# Patient Record
Sex: MALE | Race: WHITE | Employment: OTHER | URBAN - METROPOLITAN AREA
[De-identification: names, ages, dates, MRNs, and addresses within clinical notes are randomized per-mention and may not be internally consistent; named-entity substitution may affect disease eponyms.]

---

## 2017-01-06 ENCOUNTER — TELEPHONE (OUTPATIENT)
Dept: CARDIOLOGY CLINIC | Age: 77
End: 2017-01-06

## 2017-01-16 ENCOUNTER — TELEPHONE (OUTPATIENT)
Dept: CARDIOLOGY CLINIC | Age: 77
End: 2017-01-16

## 2017-01-18 ENCOUNTER — TELEPHONE (OUTPATIENT)
Dept: CARDIOLOGY CLINIC | Age: 77
End: 2017-01-18

## 2017-01-18 DIAGNOSIS — I49.9 IRREGULAR HEARTBEAT: Primary | ICD-10-CM

## 2017-02-01 ENCOUNTER — OFFICE VISIT (OUTPATIENT)
Dept: CARDIOLOGY CLINIC | Age: 77
End: 2017-02-01

## 2017-02-01 VITALS
HEIGHT: 70 IN | BODY MASS INDEX: 39.71 KG/M2 | WEIGHT: 277.4 LBS | SYSTOLIC BLOOD PRESSURE: 130 MMHG | HEART RATE: 55 BPM | OXYGEN SATURATION: 96 % | DIASTOLIC BLOOD PRESSURE: 62 MMHG

## 2017-02-01 DIAGNOSIS — I48.0 PAROXYSMAL ATRIAL FIBRILLATION (HCC): ICD-10-CM

## 2017-02-01 DIAGNOSIS — I25.10 CORONARY ARTERY DISEASE INVOLVING NATIVE CORONARY ARTERY OF NATIVE HEART WITHOUT ANGINA PECTORIS: Primary | ICD-10-CM

## 2017-02-01 DIAGNOSIS — I10 ESSENTIAL HYPERTENSION, BENIGN: ICD-10-CM

## 2017-02-01 DIAGNOSIS — E78.00 HYPERCHOLESTEREMIA: ICD-10-CM

## 2017-02-01 PROCEDURE — G8419 CALC BMI OUT NRM PARAM NOF/U: HCPCS | Performed by: INTERNAL MEDICINE

## 2017-02-01 PROCEDURE — G8484 FLU IMMUNIZE NO ADMIN: HCPCS | Performed by: INTERNAL MEDICINE

## 2017-02-01 PROCEDURE — 99214 OFFICE O/P EST MOD 30 MIN: CPT | Performed by: INTERNAL MEDICINE

## 2017-02-01 PROCEDURE — 1036F TOBACCO NON-USER: CPT | Performed by: INTERNAL MEDICINE

## 2017-02-01 PROCEDURE — G8598 ASA/ANTIPLAT THER USED: HCPCS | Performed by: INTERNAL MEDICINE

## 2017-02-01 PROCEDURE — 1123F ACP DISCUSS/DSCN MKR DOCD: CPT | Performed by: INTERNAL MEDICINE

## 2017-02-01 PROCEDURE — 4040F PNEUMOC VAC/ADMIN/RCVD: CPT | Performed by: INTERNAL MEDICINE

## 2017-02-01 PROCEDURE — 3017F COLORECTAL CA SCREEN DOC REV: CPT | Performed by: INTERNAL MEDICINE

## 2017-02-01 PROCEDURE — G8427 DOCREV CUR MEDS BY ELIG CLIN: HCPCS | Performed by: INTERNAL MEDICINE

## 2017-02-02 RX ORDER — RANITIDINE 150 MG/1
TABLET ORAL
Qty: 30 TABLET | Refills: 5 | Status: SHIPPED | OUTPATIENT
Start: 2017-02-02 | End: 2017-05-31 | Stop reason: ALTCHOICE

## 2017-05-16 ENCOUNTER — TELEPHONE (OUTPATIENT)
Dept: CARDIOLOGY CLINIC | Age: 77
End: 2017-05-16

## 2017-05-31 ENCOUNTER — OFFICE VISIT (OUTPATIENT)
Dept: CARDIOLOGY CLINIC | Age: 77
End: 2017-05-31

## 2017-05-31 VITALS
BODY MASS INDEX: 41.37 KG/M2 | WEIGHT: 289 LBS | HEART RATE: 70 BPM | OXYGEN SATURATION: 97 % | DIASTOLIC BLOOD PRESSURE: 80 MMHG | SYSTOLIC BLOOD PRESSURE: 138 MMHG | HEIGHT: 70 IN

## 2017-05-31 DIAGNOSIS — I48.0 PAF (PAROXYSMAL ATRIAL FIBRILLATION) (HCC): ICD-10-CM

## 2017-05-31 DIAGNOSIS — I25.10 CORONARY ARTERY DISEASE INVOLVING NATIVE CORONARY ARTERY OF NATIVE HEART WITHOUT ANGINA PECTORIS: Primary | ICD-10-CM

## 2017-05-31 DIAGNOSIS — I10 ESSENTIAL HYPERTENSION: ICD-10-CM

## 2017-05-31 DIAGNOSIS — E78.5 HYPERLIPIDEMIA LDL GOAL <70: ICD-10-CM

## 2017-05-31 PROCEDURE — 99214 OFFICE O/P EST MOD 30 MIN: CPT | Performed by: INTERNAL MEDICINE

## 2017-05-31 PROCEDURE — 93000 ELECTROCARDIOGRAM COMPLETE: CPT | Performed by: INTERNAL MEDICINE

## 2017-05-31 PROCEDURE — G8417 CALC BMI ABV UP PARAM F/U: HCPCS | Performed by: INTERNAL MEDICINE

## 2017-05-31 PROCEDURE — G8598 ASA/ANTIPLAT THER USED: HCPCS | Performed by: INTERNAL MEDICINE

## 2017-05-31 PROCEDURE — 3017F COLORECTAL CA SCREEN DOC REV: CPT | Performed by: INTERNAL MEDICINE

## 2017-05-31 PROCEDURE — 1036F TOBACCO NON-USER: CPT | Performed by: INTERNAL MEDICINE

## 2017-05-31 PROCEDURE — 1123F ACP DISCUSS/DSCN MKR DOCD: CPT | Performed by: INTERNAL MEDICINE

## 2017-05-31 PROCEDURE — 4040F PNEUMOC VAC/ADMIN/RCVD: CPT | Performed by: INTERNAL MEDICINE

## 2017-05-31 PROCEDURE — G8427 DOCREV CUR MEDS BY ELIG CLIN: HCPCS | Performed by: INTERNAL MEDICINE

## 2017-05-31 RX ORDER — CLOPIDOGREL BISULFATE 75 MG/1
75 TABLET ORAL DAILY
Qty: 90 TABLET | Refills: 3 | Status: SHIPPED | OUTPATIENT
Start: 2017-05-31 | End: 2018-05-23 | Stop reason: DRUGHIGH

## 2017-05-31 RX ORDER — CALCIUM CARBONATE 500(1250)
500 TABLET ORAL DAILY
COMMUNITY
End: 2021-06-11

## 2017-05-31 RX ORDER — MAGNESIUM OXIDE 400 MG/1
400 TABLET ORAL DAILY
COMMUNITY
End: 2020-08-17

## 2017-05-31 RX ORDER — ACETAMINOPHEN 160 MG
1 TABLET,DISINTEGRATING ORAL DAILY
COMMUNITY

## 2017-06-13 ENCOUNTER — TELEPHONE (OUTPATIENT)
Dept: CARDIOLOGY CLINIC | Age: 77
End: 2017-06-13

## 2017-06-13 RX ORDER — DABIGATRAN ETEXILATE 75 MG/1
75 CAPSULE, COATED PELLETS ORAL 2 TIMES DAILY
Qty: 180 CAPSULE | Refills: 3 | Status: SHIPPED | OUTPATIENT
Start: 2017-06-13 | End: 2018-05-23 | Stop reason: ALTCHOICE

## 2017-06-21 RX ORDER — VALSARTAN AND HYDROCHLOROTHIAZIDE 160; 25 MG/1; MG/1
1 TABLET ORAL 2 TIMES DAILY
Qty: 180 TABLET | Refills: 3 | Status: SHIPPED | OUTPATIENT
Start: 2017-06-21 | End: 2018-05-23 | Stop reason: DRUGHIGH

## 2017-07-11 ENCOUNTER — OFFICE VISIT (OUTPATIENT)
Dept: CARDIOLOGY CLINIC | Age: 77
End: 2017-07-11

## 2017-07-11 VITALS
BODY MASS INDEX: 42.95 KG/M2 | HEIGHT: 69 IN | SYSTOLIC BLOOD PRESSURE: 130 MMHG | OXYGEN SATURATION: 98 % | HEART RATE: 66 BPM | WEIGHT: 290 LBS | DIASTOLIC BLOOD PRESSURE: 80 MMHG

## 2017-07-11 DIAGNOSIS — I25.10 CORONARY ARTERY DISEASE INVOLVING NATIVE CORONARY ARTERY OF NATIVE HEART WITHOUT ANGINA PECTORIS: ICD-10-CM

## 2017-07-11 DIAGNOSIS — I49.9 IRREGULAR HEARTBEAT: Primary | ICD-10-CM

## 2017-07-11 PROCEDURE — G8598 ASA/ANTIPLAT THER USED: HCPCS | Performed by: INTERNAL MEDICINE

## 2017-07-11 PROCEDURE — 1123F ACP DISCUSS/DSCN MKR DOCD: CPT | Performed by: INTERNAL MEDICINE

## 2017-07-11 PROCEDURE — G8417 CALC BMI ABV UP PARAM F/U: HCPCS | Performed by: INTERNAL MEDICINE

## 2017-07-11 PROCEDURE — 99214 OFFICE O/P EST MOD 30 MIN: CPT | Performed by: INTERNAL MEDICINE

## 2017-07-11 PROCEDURE — 1036F TOBACCO NON-USER: CPT | Performed by: INTERNAL MEDICINE

## 2017-07-11 PROCEDURE — G8427 DOCREV CUR MEDS BY ELIG CLIN: HCPCS | Performed by: INTERNAL MEDICINE

## 2017-07-11 PROCEDURE — 4040F PNEUMOC VAC/ADMIN/RCVD: CPT | Performed by: INTERNAL MEDICINE

## 2017-07-11 PROCEDURE — 3017F COLORECTAL CA SCREEN DOC REV: CPT | Performed by: INTERNAL MEDICINE

## 2017-07-11 PROCEDURE — 93000 ELECTROCARDIOGRAM COMPLETE: CPT | Performed by: INTERNAL MEDICINE

## 2017-07-31 ENCOUNTER — TELEPHONE (OUTPATIENT)
Dept: CARDIOLOGY CLINIC | Age: 77
End: 2017-07-31

## 2017-07-31 DIAGNOSIS — R31.9 HEMATURIA: Primary | ICD-10-CM

## 2017-07-31 RX ORDER — ATENOLOL 50 MG/1
50 TABLET ORAL 2 TIMES DAILY
Qty: 180 TABLET | Refills: 2 | Status: SHIPPED | OUTPATIENT
Start: 2017-07-31 | End: 2018-04-19 | Stop reason: SDUPTHER

## 2017-10-05 ENCOUNTER — TELEPHONE (OUTPATIENT)
Dept: CARDIOLOGY CLINIC | Age: 77
End: 2017-10-05

## 2017-10-05 NOTE — TELEPHONE ENCOUNTER
Dr. Renetta Ramirez,     Patient seen 7/11/17. Diovan HCT increased to 160/25mg BID for now. Received labs: BUN/crt 26/1.28, previous crt 1.2 in 12/2016. Would you like to continue increase dose of Diovan HCT? Thank you. Hx: CAD s/p multiple stents, Afib, HTN, DVT and HLD.

## 2017-10-06 ENCOUNTER — TELEPHONE (OUTPATIENT)
Dept: CARDIOLOGY CLINIC | Age: 77
End: 2017-10-06

## 2017-10-06 NOTE — TELEPHONE ENCOUNTER
Dr. Silver Hunter, pt with hx of AFIB. Pt stopped taking Pradaxa 1 wk ago d/t hematuria. Cost of Pradaxa is also increasing in cost from $45 to $180 in January. Eliquis was too expensive and Xarelto also caused hematuria. He is no longer on ASA due to hematuria and only takes Plavix for CAD. Please advise, thanks.

## 2017-10-06 NOTE — TELEPHONE ENCOUNTER
A monitor is not indicated at this time because we know his diagnosis already. We know that anticoagulation is recommended. I know that he had a cystoscopy earlier in the year- was that the last time he had a urology workup? He probably needs to see them again about the hematuria because something is likely causing the bleeding (which is exacerbated by the blood thinner.)    Pending that Dr. Randa Deluna may consider watchman.

## 2017-10-06 NOTE — TELEPHONE ENCOUNTER
Called and spoke to patient. He will continue to take Diovan 160/25 mg 1 BID as he just received a 90 day supply.

## 2017-10-06 NOTE — TELEPHONE ENCOUNTER
Called and spoke to patient. Instructed to F/U with Urologist for hematuria. Patient expresses understanding.

## 2018-04-19 RX ORDER — ATENOLOL 50 MG/1
50 TABLET ORAL 2 TIMES DAILY
Qty: 180 TABLET | Refills: 0 | Status: SHIPPED | OUTPATIENT
Start: 2018-04-19 | End: 2018-05-23 | Stop reason: DRUGHIGH

## 2018-05-23 ENCOUNTER — OFFICE VISIT (OUTPATIENT)
Dept: CARDIOLOGY CLINIC | Age: 78
End: 2018-05-23

## 2018-05-23 VITALS
DIASTOLIC BLOOD PRESSURE: 80 MMHG | HEIGHT: 70 IN | WEIGHT: 302 LBS | BODY MASS INDEX: 43.23 KG/M2 | HEART RATE: 63 BPM | SYSTOLIC BLOOD PRESSURE: 138 MMHG | OXYGEN SATURATION: 97 %

## 2018-05-23 DIAGNOSIS — E78.2 MIXED HYPERLIPIDEMIA: ICD-10-CM

## 2018-05-23 DIAGNOSIS — I25.10 CORONARY ARTERY DISEASE INVOLVING NATIVE CORONARY ARTERY OF NATIVE HEART WITHOUT ANGINA PECTORIS: Primary | ICD-10-CM

## 2018-05-23 DIAGNOSIS — I10 ESSENTIAL HYPERTENSION: ICD-10-CM

## 2018-05-23 DIAGNOSIS — I48.0 PAF (PAROXYSMAL ATRIAL FIBRILLATION) (HCC): ICD-10-CM

## 2018-05-23 PROCEDURE — G8598 ASA/ANTIPLAT THER USED: HCPCS | Performed by: INTERNAL MEDICINE

## 2018-05-23 PROCEDURE — 93000 ELECTROCARDIOGRAM COMPLETE: CPT | Performed by: INTERNAL MEDICINE

## 2018-05-23 PROCEDURE — G8427 DOCREV CUR MEDS BY ELIG CLIN: HCPCS | Performed by: INTERNAL MEDICINE

## 2018-05-23 PROCEDURE — 1123F ACP DISCUSS/DSCN MKR DOCD: CPT | Performed by: INTERNAL MEDICINE

## 2018-05-23 PROCEDURE — 4040F PNEUMOC VAC/ADMIN/RCVD: CPT | Performed by: INTERNAL MEDICINE

## 2018-05-23 PROCEDURE — G8417 CALC BMI ABV UP PARAM F/U: HCPCS | Performed by: INTERNAL MEDICINE

## 2018-05-23 PROCEDURE — 1036F TOBACCO NON-USER: CPT | Performed by: INTERNAL MEDICINE

## 2018-05-23 PROCEDURE — 99214 OFFICE O/P EST MOD 30 MIN: CPT | Performed by: INTERNAL MEDICINE

## 2018-05-23 RX ORDER — CLOPIDOGREL BISULFATE 75 MG/1
75 TABLET ORAL DAILY
Qty: 90 TABLET | Refills: 5 | Status: SHIPPED | OUTPATIENT
Start: 2018-05-23 | End: 2018-12-03

## 2018-05-23 RX ORDER — VALSARTAN AND HYDROCHLOROTHIAZIDE 160; 25 MG/1; MG/1
1 TABLET ORAL 2 TIMES DAILY
Qty: 180 TABLET | Refills: 5 | Status: SHIPPED | OUTPATIENT
Start: 2018-05-23 | End: 2019-01-27 | Stop reason: ALTCHOICE

## 2018-05-23 RX ORDER — ATENOLOL 50 MG/1
50 TABLET ORAL 2 TIMES DAILY
Qty: 180 TABLET | Refills: 5 | Status: SHIPPED | OUTPATIENT
Start: 2018-05-23 | End: 2018-12-03

## 2018-06-19 RX ORDER — CLOPIDOGREL BISULFATE 75 MG/1
75 TABLET ORAL DAILY
Qty: 90 TABLET | Refills: 3 | Status: SHIPPED | OUTPATIENT
Start: 2018-06-19 | End: 2019-06-16 | Stop reason: SDUPTHER

## 2018-07-23 RX ORDER — ATENOLOL 50 MG/1
50 TABLET ORAL 2 TIMES DAILY
Qty: 180 TABLET | Refills: 3 | Status: SHIPPED | OUTPATIENT
Start: 2018-07-23 | End: 2019-08-27 | Stop reason: SDUPTHER

## 2018-11-01 ENCOUNTER — TELEPHONE (OUTPATIENT)
Dept: CARDIOLOGY CLINIC | Age: 78
End: 2018-11-01

## 2018-11-02 DIAGNOSIS — I10 ESSENTIAL HYPERTENSION, BENIGN: Primary | ICD-10-CM

## 2018-11-02 RX ORDER — NIFEDIPINE 30 MG/1
30 TABLET, FILM COATED, EXTENDED RELEASE ORAL DAILY
Qty: 90 TABLET | Refills: 5 | Status: SHIPPED | OUTPATIENT
Start: 2018-11-02 | End: 2020-01-24

## 2018-11-09 ENCOUNTER — TELEPHONE (OUTPATIENT)
Dept: CARDIOLOGY CLINIC | Age: 78
End: 2018-11-09

## 2018-11-09 RX ORDER — VALSARTAN AND HYDROCHLOROTHIAZIDE 160; 25 MG/1; MG/1
1 TABLET ORAL 2 TIMES DAILY
Qty: 180 TABLET | Refills: 2 | Status: SHIPPED | OUTPATIENT
Start: 2018-11-09 | End: 2018-12-03

## 2018-12-03 ENCOUNTER — OFFICE VISIT (OUTPATIENT)
Dept: CARDIOLOGY CLINIC | Age: 78
End: 2018-12-03
Payer: MEDICARE

## 2018-12-03 VITALS
BODY MASS INDEX: 38.22 KG/M2 | HEIGHT: 70 IN | DIASTOLIC BLOOD PRESSURE: 62 MMHG | HEART RATE: 62 BPM | OXYGEN SATURATION: 97 % | SYSTOLIC BLOOD PRESSURE: 110 MMHG | WEIGHT: 267 LBS

## 2018-12-03 DIAGNOSIS — I10 ESSENTIAL HYPERTENSION: ICD-10-CM

## 2018-12-03 DIAGNOSIS — I48.0 PAF (PAROXYSMAL ATRIAL FIBRILLATION) (HCC): ICD-10-CM

## 2018-12-03 DIAGNOSIS — I25.10 CORONARY ARTERY DISEASE INVOLVING NATIVE CORONARY ARTERY OF NATIVE HEART WITHOUT ANGINA PECTORIS: Primary | ICD-10-CM

## 2018-12-03 DIAGNOSIS — E78.2 MIXED HYPERLIPIDEMIA: ICD-10-CM

## 2018-12-03 PROCEDURE — 1036F TOBACCO NON-USER: CPT | Performed by: INTERNAL MEDICINE

## 2018-12-03 PROCEDURE — 4040F PNEUMOC VAC/ADMIN/RCVD: CPT | Performed by: INTERNAL MEDICINE

## 2018-12-03 PROCEDURE — G8417 CALC BMI ABV UP PARAM F/U: HCPCS | Performed by: INTERNAL MEDICINE

## 2018-12-03 PROCEDURE — 1123F ACP DISCUSS/DSCN MKR DOCD: CPT | Performed by: INTERNAL MEDICINE

## 2018-12-03 PROCEDURE — G8484 FLU IMMUNIZE NO ADMIN: HCPCS | Performed by: INTERNAL MEDICINE

## 2018-12-03 PROCEDURE — 93000 ELECTROCARDIOGRAM COMPLETE: CPT | Performed by: INTERNAL MEDICINE

## 2018-12-03 PROCEDURE — G8427 DOCREV CUR MEDS BY ELIG CLIN: HCPCS | Performed by: INTERNAL MEDICINE

## 2018-12-03 PROCEDURE — 1101F PT FALLS ASSESS-DOCD LE1/YR: CPT | Performed by: INTERNAL MEDICINE

## 2018-12-03 PROCEDURE — G8598 ASA/ANTIPLAT THER USED: HCPCS | Performed by: INTERNAL MEDICINE

## 2018-12-03 PROCEDURE — 99214 OFFICE O/P EST MOD 30 MIN: CPT | Performed by: INTERNAL MEDICINE

## 2018-12-03 NOTE — PATIENT INSTRUCTIONS
Patient Education        Heart-Healthy Diet: Care Instructions  Your Care Instructions    A heart-healthy diet has lots of vegetables, fruits, nuts, beans, and whole grains, and is low in salt. It limits foods that are high in saturated fat, such as meats, cheeses, and fried foods. It may be hard to change your diet, but even small changes can lower your risk of heart attack and heart disease. Follow-up care is a key part of your treatment and safety. Be sure to make and go to all appointments, and call your doctor if you are having problems. It's also a good idea to know your test results and keep a list of the medicines you take. How can you care for yourself at home? Watch your portions  · Learn what a serving is. A \"serving\" and a \"portion\" are not always the same thing. Make sure that you are not eating larger portions than are recommended. For example, a serving of pasta is ½ cup. A serving size of meat is 2 to 3 ounces. A 3-ounce serving is about the size of a deck of cards. Measure serving sizes until you are good at [place]" them. Keep in mind that restaurants often serve portions that are 2 or 3 times the size of one serving. · To keep your energy level up and keep you from feeling hungry, eat often but in smaller portions. · Eat only the number of calories you need to stay at a healthy weight. If you need to lose weight, eat fewer calories than your body burns (through exercise and other physical activity). Eat more fruits and vegetables  · Eat a variety of fruit and vegetables every day. Dark green, deep orange, red, or yellow fruits and vegetables are especially good for you. Examples include spinach, carrots, peaches, and berries. · Keep carrots, celery, and other veggies handy for snacks. Buy fruit that is in season and store it where you can see it so that you will be tempted to eat it. · Cook dishes that have a lot of veggies in them, such as stir-fries and soups.   Limit saturated and

## 2018-12-03 NOTE — PROGRESS NOTES
4.0-mm x 16-mm Synergy stent lesion reduced from 80% to 0%. ECHO 12/9/2016   Normal LV size and systolic function: EF is  86%. Grade I diastolic  dysfunction.   Trace mitral regurgitation is present.   The left atrium is normal in size.   Normal right ventricular size and function.   There is trace to mild tricuspid regurgitation with RVSP estimated at 40  mmHg. which is mildly elevated.   Trivial pulmonic regurgitation present.       MPI 3/25/2015:   Normal myocardial perfusion study. Normal LV size and systolic function     Coronary Angiogram and PCI: 12/2016  1. Two-vessel coronary disease with two tandem lesions in the 80-90% range involving the proximal circumflex. 2. An 80% eccentric mid RCA lesion. 3. Normal left ventricular size and systolic function. 4. Normal hemodynamics  Successful stenting of the proximal circumflex. Two tandem lesions were reduced to 0% using a 3.0-mm x 34-mm Synergy stent. The mid right coronary artery was stented using a 4.0-mm x 16-mm Synergy stent lesion reduced from 80% to 0%. Assessment and Plan:    Patient needs to have eye lid surgery and has been instructed to hold ASA and Plavix for 2 weeks prior. I have discussed the risk of stopping both and asked that he check with surgeon to see if he can perform surgery at least while on ASA.       CAD. S/p stenting to mid RCA and Proximal Circumflex 12/9/2016. No further angina  Continue ASA and Plavix    Intolerant to statins. Hypertension. (Over age 62, goal BP <150/90.)  Has been labile in the past but controlled today  Continue Diovan and recently prescribed Nifedipine     Paroxysmal atrial fibrillation. Xarelto stopped due to hematuria; Tried Eliquis 2.5 bid but too expensive. Also tried Pradaxa; hematuria    Not a candidate for Watchman given his difficult anatomy. Have discussed in detail risk of stroke. Patient understands risk. Regular rhythm today. Continue beta blocker for rate control.    Not on

## 2018-12-27 ENCOUNTER — TELEPHONE (OUTPATIENT)
Dept: CARDIOLOGY CLINIC | Age: 78
End: 2018-12-27

## 2018-12-27 NOTE — TELEPHONE ENCOUNTER
Dr Casie Padilla  Patient is having a Browplasty Upper Lid surgery on 1/10/19. He will need to hold Plavix for 10 days and aspirin for 14 days. Last OV 12/3/18. He has hx of CAD with s/p stenting 12/2016 and PAF. Thanks.

## 2019-01-27 ENCOUNTER — APPOINTMENT (OUTPATIENT)
Dept: GENERAL RADIOLOGY | Age: 79
DRG: 247 | End: 2019-01-27
Payer: MEDICARE

## 2019-01-27 ENCOUNTER — HOSPITAL ENCOUNTER (OUTPATIENT)
Age: 79
Setting detail: OBSERVATION
Discharge: HOME | DRG: 247 | End: 2019-01-29
Attending: EMERGENCY MEDICINE | Admitting: INTERNAL MEDICINE
Payer: MEDICARE

## 2019-01-27 DIAGNOSIS — R07.9 CHEST PAIN, UNSPECIFIED TYPE: Primary | ICD-10-CM

## 2019-01-27 PROBLEM — I48.0 PAF (PAROXYSMAL ATRIAL FIBRILLATION) (HCC): Status: ACTIVE | Noted: 2019-01-27

## 2019-01-27 LAB
A/G RATIO: 1.6 (ref 1.1–2.2)
ALBUMIN SERPL-MCNC: 4.1 G/DL (ref 3.4–5)
ALP BLD-CCNC: 69 U/L (ref 40–129)
ALT SERPL-CCNC: 19 U/L (ref 10–40)
ANION GAP SERPL CALCULATED.3IONS-SCNC: 8 MMOL/L (ref 3–16)
AST SERPL-CCNC: 13 U/L (ref 15–37)
BASOPHILS ABSOLUTE: 0 K/UL (ref 0–0.2)
BASOPHILS RELATIVE PERCENT: 0.5 %
BILIRUB SERPL-MCNC: 0.4 MG/DL (ref 0–1)
BUN BLDV-MCNC: 17 MG/DL (ref 7–20)
CALCIUM SERPL-MCNC: 9.9 MG/DL (ref 8.3–10.6)
CHLORIDE BLD-SCNC: 103 MMOL/L (ref 99–110)
CO2: 30 MMOL/L (ref 21–32)
CREAT SERPL-MCNC: 1 MG/DL (ref 0.8–1.3)
EOSINOPHILS ABSOLUTE: 0.1 K/UL (ref 0–0.6)
EOSINOPHILS RELATIVE PERCENT: 0.9 %
GFR AFRICAN AMERICAN: >60
GFR NON-AFRICAN AMERICAN: >60
GLOBULIN: 2.6 G/DL
GLUCOSE BLD-MCNC: 122 MG/DL (ref 70–99)
HCT VFR BLD CALC: 42.7 % (ref 40.5–52.5)
HEMOGLOBIN: 14.5 G/DL (ref 13.5–17.5)
INR BLD: 1.06 (ref 0.86–1.14)
LYMPHOCYTES ABSOLUTE: 2.2 K/UL (ref 1–5.1)
LYMPHOCYTES RELATIVE PERCENT: 30.7 %
MCH RBC QN AUTO: 30.2 PG (ref 26–34)
MCHC RBC AUTO-ENTMCNC: 34 G/DL (ref 31–36)
MCV RBC AUTO: 88.8 FL (ref 80–100)
MONOCYTES ABSOLUTE: 0.6 K/UL (ref 0–1.3)
MONOCYTES RELATIVE PERCENT: 8.5 %
NEUTROPHILS ABSOLUTE: 4.3 K/UL (ref 1.7–7.7)
NEUTROPHILS RELATIVE PERCENT: 59.4 %
PDW BLD-RTO: 15.6 % (ref 12.4–15.4)
PLATELET # BLD: 161 K/UL (ref 135–450)
PMV BLD AUTO: 7.7 FL (ref 5–10.5)
POTASSIUM SERPL-SCNC: 4 MMOL/L (ref 3.5–5.1)
PRO-BNP: 150 PG/ML (ref 0–449)
PROTHROMBIN TIME: 12.1 SEC (ref 9.8–13)
RBC # BLD: 4.8 M/UL (ref 4.2–5.9)
SODIUM BLD-SCNC: 141 MMOL/L (ref 136–145)
TOTAL PROTEIN: 6.7 G/DL (ref 6.4–8.2)
TROPONIN: <0.01 NG/ML
TROPONIN: <0.01 NG/ML
WBC # BLD: 7.3 K/UL (ref 4–11)

## 2019-01-27 PROCEDURE — 84484 ASSAY OF TROPONIN QUANT: CPT

## 2019-01-27 PROCEDURE — 83880 ASSAY OF NATRIURETIC PEPTIDE: CPT

## 2019-01-27 PROCEDURE — 99285 EMERGENCY DEPT VISIT HI MDM: CPT

## 2019-01-27 PROCEDURE — 71046 X-RAY EXAM CHEST 2 VIEWS: CPT

## 2019-01-27 PROCEDURE — 80053 COMPREHEN METABOLIC PANEL: CPT

## 2019-01-27 PROCEDURE — G0378 HOSPITAL OBSERVATION PER HR: HCPCS

## 2019-01-27 PROCEDURE — 85610 PROTHROMBIN TIME: CPT

## 2019-01-27 PROCEDURE — 6370000000 HC RX 637 (ALT 250 FOR IP): Performed by: NURSE PRACTITIONER

## 2019-01-27 PROCEDURE — 6370000000 HC RX 637 (ALT 250 FOR IP): Performed by: INTERNAL MEDICINE

## 2019-01-27 PROCEDURE — 36415 COLL VENOUS BLD VENIPUNCTURE: CPT

## 2019-01-27 PROCEDURE — 93005 ELECTROCARDIOGRAM TRACING: CPT | Performed by: EMERGENCY MEDICINE

## 2019-01-27 PROCEDURE — 2580000003 HC RX 258: Performed by: INTERNAL MEDICINE

## 2019-01-27 PROCEDURE — 85025 COMPLETE CBC W/AUTO DIFF WBC: CPT

## 2019-01-27 RX ORDER — VALSARTAN 160 MG/1
160 TABLET ORAL EVERY EVENING
Status: ON HOLD | COMMUNITY
End: 2019-01-29 | Stop reason: HOSPADM

## 2019-01-27 RX ORDER — NICOTINE 21 MG/24HR
1 PATCH, TRANSDERMAL 24 HOURS TRANSDERMAL DAILY PRN
Status: DISCONTINUED | OUTPATIENT
Start: 2019-01-27 | End: 2019-01-27

## 2019-01-27 RX ORDER — POTASSIUM CHLORIDE 1.5 G/1.77G
40 POWDER, FOR SOLUTION ORAL PRN
Status: DISCONTINUED | OUTPATIENT
Start: 2019-01-27 | End: 2019-01-29 | Stop reason: HOSPADM

## 2019-01-27 RX ORDER — NIFEDIPINE 30 MG/1
30 TABLET, EXTENDED RELEASE ORAL DAILY
Status: DISCONTINUED | OUTPATIENT
Start: 2019-01-28 | End: 2019-01-29 | Stop reason: HOSPADM

## 2019-01-27 RX ORDER — POTASSIUM CHLORIDE 7.45 MG/ML
10 INJECTION INTRAVENOUS PRN
Status: DISCONTINUED | OUTPATIENT
Start: 2019-01-27 | End: 2019-01-29 | Stop reason: HOSPADM

## 2019-01-27 RX ORDER — VALSARTAN 160 MG/1
160 TABLET ORAL NIGHTLY
Status: DISCONTINUED | OUTPATIENT
Start: 2019-01-27 | End: 2019-01-29 | Stop reason: HOSPADM

## 2019-01-27 RX ORDER — VALSARTAN AND HYDROCHLOROTHIAZIDE 160; 25 MG/1; MG/1
1 TABLET ORAL 2 TIMES DAILY
Status: DISCONTINUED | OUTPATIENT
Start: 2019-01-27 | End: 2019-01-27

## 2019-01-27 RX ORDER — ONDANSETRON 2 MG/ML
4 INJECTION INTRAMUSCULAR; INTRAVENOUS EVERY 6 HOURS PRN
Status: DISCONTINUED | OUTPATIENT
Start: 2019-01-27 | End: 2019-01-28 | Stop reason: SDUPTHER

## 2019-01-27 RX ORDER — CLOPIDOGREL BISULFATE 75 MG/1
75 TABLET ORAL DAILY
Status: DISCONTINUED | OUTPATIENT
Start: 2019-01-28 | End: 2019-01-28 | Stop reason: SDUPTHER

## 2019-01-27 RX ORDER — POTASSIUM CHLORIDE 20 MEQ/1
40 TABLET, EXTENDED RELEASE ORAL PRN
Status: DISCONTINUED | OUTPATIENT
Start: 2019-01-27 | End: 2019-01-29 | Stop reason: HOSPADM

## 2019-01-27 RX ORDER — NITROGLYCERIN 0.4 MG/1
0.4 TABLET SUBLINGUAL EVERY 5 MIN PRN
Status: DISCONTINUED | OUTPATIENT
Start: 2019-01-27 | End: 2019-01-29 | Stop reason: HOSPADM

## 2019-01-27 RX ORDER — RANITIDINE 150 MG/1
150 TABLET ORAL 2 TIMES DAILY
COMMUNITY
End: 2020-02-14 | Stop reason: ALTCHOICE

## 2019-01-27 RX ORDER — VALSARTAN AND HYDROCHLOROTHIAZIDE 160; 25 MG/1; MG/1
1 TABLET ORAL DAILY
Status: DISCONTINUED | OUTPATIENT
Start: 2019-01-28 | End: 2019-01-29 | Stop reason: HOSPADM

## 2019-01-27 RX ORDER — SODIUM CHLORIDE 0.9 % (FLUSH) 0.9 %
10 SYRINGE (ML) INJECTION EVERY 12 HOURS SCHEDULED
Status: DISCONTINUED | OUTPATIENT
Start: 2019-01-27 | End: 2019-01-28 | Stop reason: SDUPTHER

## 2019-01-27 RX ORDER — FAMOTIDINE 20 MG/1
20 TABLET, FILM COATED ORAL 2 TIMES DAILY PRN
Status: DISCONTINUED | OUTPATIENT
Start: 2019-01-27 | End: 2019-01-29 | Stop reason: HOSPADM

## 2019-01-27 RX ORDER — ASPIRIN 81 MG/1
81 TABLET, CHEWABLE ORAL DAILY
Status: DISCONTINUED | OUTPATIENT
Start: 2019-01-28 | End: 2019-01-28 | Stop reason: SDUPTHER

## 2019-01-27 RX ORDER — VALSARTAN AND HYDROCHLOROTHIAZIDE 160; 25 MG/1; MG/1
1 TABLET ORAL EVERY MORNING
COMMUNITY
End: 2020-01-23

## 2019-01-27 RX ORDER — DOCUSATE SODIUM 100 MG/1
100 CAPSULE, LIQUID FILLED ORAL 2 TIMES DAILY PRN
Status: DISCONTINUED | OUTPATIENT
Start: 2019-01-27 | End: 2019-01-29 | Stop reason: HOSPADM

## 2019-01-27 RX ORDER — SODIUM CHLORIDE 0.9 % (FLUSH) 0.9 %
10 SYRINGE (ML) INJECTION PRN
Status: DISCONTINUED | OUTPATIENT
Start: 2019-01-27 | End: 2019-01-28 | Stop reason: SDUPTHER

## 2019-01-27 RX ORDER — MAGNESIUM SULFATE 1 G/100ML
1 INJECTION INTRAVENOUS PRN
Status: DISCONTINUED | OUTPATIENT
Start: 2019-01-27 | End: 2019-01-29 | Stop reason: HOSPADM

## 2019-01-27 RX ORDER — ATENOLOL 50 MG/1
50 TABLET ORAL 2 TIMES DAILY
Status: DISCONTINUED | OUTPATIENT
Start: 2019-01-27 | End: 2019-01-29 | Stop reason: HOSPADM

## 2019-01-27 RX ADMIN — ATENOLOL 50 MG: 50 TABLET ORAL at 21:51

## 2019-01-27 RX ADMIN — FAMOTIDINE 20 MG: 20 TABLET ORAL at 21:51

## 2019-01-27 RX ADMIN — NITROGLYCERIN 0.4 MG: 0.4 TABLET, ORALLY DISINTEGRATING SUBLINGUAL at 18:52

## 2019-01-27 RX ADMIN — Medication 10 ML: at 21:51

## 2019-01-27 RX ADMIN — VALSARTAN 160 MG: 160 TABLET ORAL at 21:50

## 2019-01-27 ASSESSMENT — PAIN DESCRIPTION - PROGRESSION: CLINICAL_PROGRESSION: GRADUALLY WORSENING

## 2019-01-27 ASSESSMENT — PAIN SCALES - GENERAL
PAINLEVEL_OUTOF10: 3
PAINLEVEL_OUTOF10: 1
PAINLEVEL_OUTOF10: 1

## 2019-01-27 ASSESSMENT — PAIN DESCRIPTION - LOCATION: LOCATION: BACK;CHEST

## 2019-01-27 ASSESSMENT — PAIN DESCRIPTION - PAIN TYPE: TYPE: ACUTE PAIN

## 2019-01-27 ASSESSMENT — PAIN DESCRIPTION - FREQUENCY: FREQUENCY: CONTINUOUS

## 2019-01-28 ENCOUNTER — APPOINTMENT (OUTPATIENT)
Dept: CARDIAC CATH/INVASIVE PROCEDURES | Age: 79
DRG: 247 | End: 2019-01-28
Payer: MEDICARE

## 2019-01-28 LAB
ANION GAP SERPL CALCULATED.3IONS-SCNC: 11 MMOL/L (ref 3–16)
BASOPHILS ABSOLUTE: 0 K/UL (ref 0–0.2)
BASOPHILS RELATIVE PERCENT: 0.5 %
BUN BLDV-MCNC: 14 MG/DL (ref 7–20)
CALCIUM SERPL-MCNC: 9.9 MG/DL (ref 8.3–10.6)
CHLORIDE BLD-SCNC: 102 MMOL/L (ref 99–110)
CO2: 28 MMOL/L (ref 21–32)
CREAT SERPL-MCNC: 1 MG/DL (ref 0.8–1.3)
EKG ATRIAL RATE: 61 BPM
EKG ATRIAL RATE: 63 BPM
EKG DIAGNOSIS: NORMAL
EKG DIAGNOSIS: NORMAL
EKG P AXIS: 47 DEGREES
EKG P AXIS: 61 DEGREES
EKG P-R INTERVAL: 182 MS
EKG P-R INTERVAL: 188 MS
EKG Q-T INTERVAL: 418 MS
EKG Q-T INTERVAL: 426 MS
EKG QRS DURATION: 98 MS
EKG QRS DURATION: 98 MS
EKG QTC CALCULATION (BAZETT): 427 MS
EKG QTC CALCULATION (BAZETT): 428 MS
EKG R AXIS: -23 DEGREES
EKG R AXIS: -23 DEGREES
EKG T AXIS: -1 DEGREES
EKG T AXIS: 15 DEGREES
EKG VENTRICULAR RATE: 61 BPM
EKG VENTRICULAR RATE: 63 BPM
EOSINOPHILS ABSOLUTE: 0 K/UL (ref 0–0.6)
EOSINOPHILS RELATIVE PERCENT: 0.2 %
GFR AFRICAN AMERICAN: >60
GFR NON-AFRICAN AMERICAN: >60
GLUCOSE BLD-MCNC: 113 MG/DL (ref 70–99)
HCT VFR BLD CALC: 46.7 % (ref 40.5–52.5)
HEMOGLOBIN: 15.4 G/DL (ref 13.5–17.5)
LYMPHOCYTES ABSOLUTE: 1.5 K/UL (ref 1–5.1)
LYMPHOCYTES RELATIVE PERCENT: 20.5 %
MAGNESIUM: 1.9 MG/DL (ref 1.8–2.4)
MCH RBC QN AUTO: 29.6 PG (ref 26–34)
MCHC RBC AUTO-ENTMCNC: 32.9 G/DL (ref 31–36)
MCV RBC AUTO: 89.9 FL (ref 80–100)
MONOCYTES ABSOLUTE: 0.5 K/UL (ref 0–1.3)
MONOCYTES RELATIVE PERCENT: 6.6 %
NEUTROPHILS ABSOLUTE: 5.4 K/UL (ref 1.7–7.7)
NEUTROPHILS RELATIVE PERCENT: 72.2 %
PDW BLD-RTO: 15.2 % (ref 12.4–15.4)
PLATELET # BLD: 158 K/UL (ref 135–450)
PMV BLD AUTO: 7.7 FL (ref 5–10.5)
POC ACT LR: 271 SEC
POC ACT LR: 344 SEC
POTASSIUM REFLEX MAGNESIUM: 4.2 MMOL/L (ref 3.5–5.1)
RBC # BLD: 5.2 M/UL (ref 4.2–5.9)
SODIUM BLD-SCNC: 141 MMOL/L (ref 136–145)
TROPONIN: <0.01 NG/ML
WBC # BLD: 7.5 K/UL (ref 4–11)

## 2019-01-28 PROCEDURE — C1769 GUIDE WIRE: HCPCS

## 2019-01-28 PROCEDURE — 85347 COAGULATION TIME ACTIVATED: CPT

## 2019-01-28 PROCEDURE — C1874 STENT, COATED/COV W/DEL SYS: HCPCS

## 2019-01-28 PROCEDURE — G0378 HOSPITAL OBSERVATION PER HR: HCPCS

## 2019-01-28 PROCEDURE — 2709999900 HC NON-CHARGEABLE SUPPLY

## 2019-01-28 PROCEDURE — C9600 PERC DRUG-EL COR STENT SING: HCPCS | Performed by: INTERNAL MEDICINE

## 2019-01-28 PROCEDURE — 84484 ASSAY OF TROPONIN QUANT: CPT

## 2019-01-28 PROCEDURE — 93005 ELECTROCARDIOGRAM TRACING: CPT

## 2019-01-28 PROCEDURE — 99152 MOD SED SAME PHYS/QHP 5/>YRS: CPT | Performed by: INTERNAL MEDICINE

## 2019-01-28 PROCEDURE — 80048 BASIC METABOLIC PNL TOTAL CA: CPT

## 2019-01-28 PROCEDURE — 6370000000 HC RX 637 (ALT 250 FOR IP): Performed by: NURSE PRACTITIONER

## 2019-01-28 PROCEDURE — 4A023N7 MEASUREMENT OF CARDIAC SAMPLING AND PRESSURE, LEFT HEART, PERCUTANEOUS APPROACH: ICD-10-PCS | Performed by: INTERNAL MEDICINE

## 2019-01-28 PROCEDURE — 93458 L HRT ARTERY/VENTRICLE ANGIO: CPT | Performed by: INTERNAL MEDICINE

## 2019-01-28 PROCEDURE — C1887 CATHETER, GUIDING: HCPCS

## 2019-01-28 PROCEDURE — 92928 PRQ TCAT PLMT NTRAC ST 1 LES: CPT | Performed by: INTERNAL MEDICINE

## 2019-01-28 PROCEDURE — 92929 PR PRQ TRLUML CORONARY STENT W/ANGIO ADDL ART/BRNCH: CPT | Performed by: INTERNAL MEDICINE

## 2019-01-28 PROCEDURE — 85025 COMPLETE CBC W/AUTO DIFF WBC: CPT

## 2019-01-28 PROCEDURE — B2111ZZ FLUOROSCOPY OF MULTIPLE CORONARY ARTERIES USING LOW OSMOLAR CONTRAST: ICD-10-PCS | Performed by: INTERNAL MEDICINE

## 2019-01-28 PROCEDURE — 6360000004 HC RX CONTRAST MEDICATION: Performed by: INTERNAL MEDICINE

## 2019-01-28 PROCEDURE — 027135Z DILATION OF CORONARY ARTERY, TWO ARTERIES WITH TWO DRUG-ELUTING INTRALUMINAL DEVICES, PERCUTANEOUS APPROACH: ICD-10-PCS | Performed by: INTERNAL MEDICINE

## 2019-01-28 PROCEDURE — 6360000002 HC RX W HCPCS

## 2019-01-28 PROCEDURE — 2580000003 HC RX 258

## 2019-01-28 PROCEDURE — 2060000000 HC ICU INTERMEDIATE R&B

## 2019-01-28 PROCEDURE — 36415 COLL VENOUS BLD VENIPUNCTURE: CPT

## 2019-01-28 PROCEDURE — 6370000000 HC RX 637 (ALT 250 FOR IP): Performed by: INTERNAL MEDICINE

## 2019-01-28 PROCEDURE — 99153 MOD SED SAME PHYS/QHP EA: CPT | Performed by: INTERNAL MEDICINE

## 2019-01-28 PROCEDURE — 6370000000 HC RX 637 (ALT 250 FOR IP)

## 2019-01-28 PROCEDURE — 93005 ELECTROCARDIOGRAM TRACING: CPT | Performed by: INTERNAL MEDICINE

## 2019-01-28 PROCEDURE — 2500000003 HC RX 250 WO HCPCS

## 2019-01-28 PROCEDURE — C1894 INTRO/SHEATH, NON-LASER: HCPCS

## 2019-01-28 PROCEDURE — B2151ZZ FLUOROSCOPY OF LEFT HEART USING LOW OSMOLAR CONTRAST: ICD-10-PCS | Performed by: INTERNAL MEDICINE

## 2019-01-28 PROCEDURE — C1725 CATH, TRANSLUMIN NON-LASER: HCPCS

## 2019-01-28 PROCEDURE — C9601 PERC DRUG-EL COR STENT BRAN: HCPCS | Performed by: INTERNAL MEDICINE

## 2019-01-28 PROCEDURE — 83735 ASSAY OF MAGNESIUM: CPT

## 2019-01-28 PROCEDURE — 6360000002 HC RX W HCPCS: Performed by: INTERNAL MEDICINE

## 2019-01-28 PROCEDURE — 2580000003 HC RX 258: Performed by: INTERNAL MEDICINE

## 2019-01-28 PROCEDURE — 94760 N-INVAS EAR/PLS OXIMETRY 1: CPT

## 2019-01-28 PROCEDURE — 99223 1ST HOSP IP/OBS HIGH 75: CPT | Performed by: INTERNAL MEDICINE

## 2019-01-28 RX ORDER — SODIUM CHLORIDE 0.9 % (FLUSH) 0.9 %
10 SYRINGE (ML) INJECTION EVERY 12 HOURS SCHEDULED
Status: DISCONTINUED | OUTPATIENT
Start: 2019-01-28 | End: 2019-01-29 | Stop reason: HOSPADM

## 2019-01-28 RX ORDER — MIDAZOLAM HYDROCHLORIDE 1 MG/ML
2 INJECTION INTRAMUSCULAR; INTRAVENOUS
Status: ACTIVE | OUTPATIENT
Start: 2019-01-28 | End: 2019-01-28

## 2019-01-28 RX ORDER — ACETAMINOPHEN 325 MG/1
650 TABLET ORAL EVERY 4 HOURS PRN
Status: DISCONTINUED | OUTPATIENT
Start: 2019-01-28 | End: 2019-01-29 | Stop reason: HOSPADM

## 2019-01-28 RX ORDER — SODIUM CHLORIDE 9 MG/ML
INJECTION, SOLUTION INTRAVENOUS CONTINUOUS
Status: DISCONTINUED | OUTPATIENT
Start: 2019-01-28 | End: 2019-01-29 | Stop reason: HOSPADM

## 2019-01-28 RX ORDER — ATORVASTATIN CALCIUM 40 MG/1
40 TABLET, FILM COATED ORAL NIGHTLY
Status: DISCONTINUED | OUTPATIENT
Start: 2019-01-28 | End: 2019-01-29 | Stop reason: HOSPADM

## 2019-01-28 RX ORDER — CLOPIDOGREL BISULFATE 75 MG/1
75 TABLET ORAL DAILY
Status: DISCONTINUED | OUTPATIENT
Start: 2019-01-29 | End: 2019-01-29 | Stop reason: HOSPADM

## 2019-01-28 RX ORDER — FENTANYL CITRATE 50 UG/ML
25 INJECTION, SOLUTION INTRAMUSCULAR; INTRAVENOUS
Status: ACTIVE | OUTPATIENT
Start: 2019-01-28 | End: 2019-01-28

## 2019-01-28 RX ORDER — DIPHENHYDRAMINE HYDROCHLORIDE 50 MG/ML
50 INJECTION INTRAMUSCULAR; INTRAVENOUS ONCE
Status: DISCONTINUED | OUTPATIENT
Start: 2019-01-28 | End: 2019-01-28

## 2019-01-28 RX ORDER — ATROPINE SULFATE 0.4 MG/ML
0.5 AMPUL (ML) INJECTION
Status: ACTIVE | OUTPATIENT
Start: 2019-01-28 | End: 2019-01-28

## 2019-01-28 RX ORDER — SODIUM CHLORIDE 0.9 % (FLUSH) 0.9 %
10 SYRINGE (ML) INJECTION PRN
Status: DISCONTINUED | OUTPATIENT
Start: 2019-01-28 | End: 2019-01-29 | Stop reason: HOSPADM

## 2019-01-28 RX ORDER — ALPRAZOLAM 0.5 MG/1
0.5 TABLET ORAL
Status: ACTIVE | OUTPATIENT
Start: 2019-01-28 | End: 2019-01-28

## 2019-01-28 RX ORDER — MORPHINE SULFATE 2 MG/ML
2 INJECTION, SOLUTION INTRAMUSCULAR; INTRAVENOUS
Status: ACTIVE | OUTPATIENT
Start: 2019-01-28 | End: 2019-01-28

## 2019-01-28 RX ORDER — 0.9 % SODIUM CHLORIDE 0.9 %
500 INTRAVENOUS SOLUTION INTRAVENOUS PRN
Status: DISCONTINUED | OUTPATIENT
Start: 2019-01-28 | End: 2019-01-29 | Stop reason: HOSPADM

## 2019-01-28 RX ORDER — ONDANSETRON 2 MG/ML
4 INJECTION INTRAMUSCULAR; INTRAVENOUS EVERY 6 HOURS PRN
Status: DISCONTINUED | OUTPATIENT
Start: 2019-01-28 | End: 2019-01-29 | Stop reason: HOSPADM

## 2019-01-28 RX ORDER — ASPIRIN 81 MG/1
81 TABLET, CHEWABLE ORAL DAILY
Status: DISCONTINUED | OUTPATIENT
Start: 2019-01-29 | End: 2019-01-29 | Stop reason: HOSPADM

## 2019-01-28 RX ADMIN — Medication 10 ML: at 21:06

## 2019-01-28 RX ADMIN — VALSARTAN 160 MG: 160 TABLET ORAL at 21:05

## 2019-01-28 RX ADMIN — ATENOLOL 50 MG: 50 TABLET ORAL at 17:30

## 2019-01-28 RX ADMIN — SODIUM CHLORIDE: 9 INJECTION, SOLUTION INTRAVENOUS at 13:32

## 2019-01-28 RX ADMIN — NIFEDIPINE 30 MG: 30 TABLET, FILM COATED, EXTENDED RELEASE ORAL at 17:30

## 2019-01-28 RX ADMIN — IOPAMIDOL 177 ML: 755 INJECTION, SOLUTION INTRAVENOUS at 10:47

## 2019-01-28 RX ADMIN — ONDANSETRON 4 MG: 2 INJECTION INTRAMUSCULAR; INTRAVENOUS at 13:32

## 2019-01-28 RX ADMIN — Medication 10 ML: at 21:08

## 2019-01-28 RX ADMIN — SODIUM CHLORIDE: 9 INJECTION, SOLUTION INTRAVENOUS at 23:39

## 2019-01-28 ASSESSMENT — PAIN DESCRIPTION - LOCATION: LOCATION: BACK

## 2019-01-28 ASSESSMENT — PAIN DESCRIPTION - PAIN TYPE: TYPE: ACUTE PAIN

## 2019-01-28 ASSESSMENT — PAIN SCALES - GENERAL
PAINLEVEL_OUTOF10: 3
PAINLEVEL_OUTOF10: 3
PAINLEVEL_OUTOF10: 0
PAINLEVEL_OUTOF10: 3
PAINLEVEL_OUTOF10: 0

## 2019-01-29 VITALS
DIASTOLIC BLOOD PRESSURE: 72 MMHG | TEMPERATURE: 97.8 F | HEIGHT: 70 IN | RESPIRATION RATE: 16 BRPM | BODY MASS INDEX: 37.84 KG/M2 | SYSTOLIC BLOOD PRESSURE: 145 MMHG | HEART RATE: 69 BPM | WEIGHT: 264.33 LBS | OXYGEN SATURATION: 95 %

## 2019-01-29 LAB
ANION GAP SERPL CALCULATED.3IONS-SCNC: 11 MMOL/L (ref 3–16)
BASOPHILS ABSOLUTE: 0 K/UL (ref 0–0.2)
BASOPHILS RELATIVE PERCENT: 0.5 %
BUN BLDV-MCNC: 19 MG/DL (ref 7–20)
CALCIUM SERPL-MCNC: 9.3 MG/DL (ref 8.3–10.6)
CHLORIDE BLD-SCNC: 105 MMOL/L (ref 99–110)
CO2: 25 MMOL/L (ref 21–32)
CREAT SERPL-MCNC: 1 MG/DL (ref 0.8–1.3)
EKG ATRIAL RATE: 61 BPM
EKG DIAGNOSIS: NORMAL
EKG P AXIS: 31 DEGREES
EKG P-R INTERVAL: 178 MS
EKG Q-T INTERVAL: 416 MS
EKG QRS DURATION: 90 MS
EKG QTC CALCULATION (BAZETT): 418 MS
EKG R AXIS: -22 DEGREES
EKG T AXIS: -7 DEGREES
EKG VENTRICULAR RATE: 61 BPM
EOSINOPHILS ABSOLUTE: 0 K/UL (ref 0–0.6)
EOSINOPHILS RELATIVE PERCENT: 0.6 %
GFR AFRICAN AMERICAN: >60
GFR NON-AFRICAN AMERICAN: >60
GLUCOSE BLD-MCNC: 97 MG/DL (ref 70–99)
HCT VFR BLD CALC: 44.4 % (ref 40.5–52.5)
HEMOGLOBIN: 14.8 G/DL (ref 13.5–17.5)
LV EF: 58 %
LVEF MODALITY: NORMAL
LYMPHOCYTES ABSOLUTE: 2.1 K/UL (ref 1–5.1)
LYMPHOCYTES RELATIVE PERCENT: 23.7 %
MAGNESIUM: 2 MG/DL (ref 1.8–2.4)
MCH RBC QN AUTO: 30.1 PG (ref 26–34)
MCHC RBC AUTO-ENTMCNC: 33.4 G/DL (ref 31–36)
MCV RBC AUTO: 90.1 FL (ref 80–100)
MONOCYTES ABSOLUTE: 0.7 K/UL (ref 0–1.3)
MONOCYTES RELATIVE PERCENT: 8 %
NEUTROPHILS ABSOLUTE: 5.8 K/UL (ref 1.7–7.7)
NEUTROPHILS RELATIVE PERCENT: 67.2 %
PDW BLD-RTO: 15.2 % (ref 12.4–15.4)
PLATELET # BLD: 163 K/UL (ref 135–450)
PMV BLD AUTO: 7.8 FL (ref 5–10.5)
POC ACT LR: 171 SEC
POTASSIUM REFLEX MAGNESIUM: 3.8 MMOL/L (ref 3.5–5.1)
RBC # BLD: 4.93 M/UL (ref 4.2–5.9)
SODIUM BLD-SCNC: 141 MMOL/L (ref 136–145)
WBC # BLD: 8.7 K/UL (ref 4–11)

## 2019-01-29 PROCEDURE — 6370000000 HC RX 637 (ALT 250 FOR IP): Performed by: NURSE PRACTITIONER

## 2019-01-29 PROCEDURE — 93306 TTE W/DOPPLER COMPLETE: CPT

## 2019-01-29 PROCEDURE — 80048 BASIC METABOLIC PNL TOTAL CA: CPT

## 2019-01-29 PROCEDURE — 85025 COMPLETE CBC W/AUTO DIFF WBC: CPT

## 2019-01-29 PROCEDURE — G0378 HOSPITAL OBSERVATION PER HR: HCPCS

## 2019-01-29 PROCEDURE — 93005 ELECTROCARDIOGRAM TRACING: CPT | Performed by: INTERNAL MEDICINE

## 2019-01-29 PROCEDURE — 94760 N-INVAS EAR/PLS OXIMETRY 1: CPT

## 2019-01-29 PROCEDURE — 93010 ELECTROCARDIOGRAM REPORT: CPT | Performed by: INTERNAL MEDICINE

## 2019-01-29 PROCEDURE — 6370000000 HC RX 637 (ALT 250 FOR IP): Performed by: INTERNAL MEDICINE

## 2019-01-29 PROCEDURE — 99233 SBSQ HOSP IP/OBS HIGH 50: CPT | Performed by: INTERNAL MEDICINE

## 2019-01-29 PROCEDURE — 83735 ASSAY OF MAGNESIUM: CPT

## 2019-01-29 PROCEDURE — 36415 COLL VENOUS BLD VENIPUNCTURE: CPT

## 2019-01-29 RX ORDER — ATORVASTATIN CALCIUM 40 MG/1
40 TABLET, FILM COATED ORAL NIGHTLY
Qty: 30 TABLET | Refills: 3 | Status: SHIPPED | OUTPATIENT
Start: 2019-01-29 | End: 2019-01-29 | Stop reason: HOSPADM

## 2019-01-29 RX ORDER — VALSARTAN 160 MG/1
160 TABLET ORAL NIGHTLY
Qty: 30 TABLET | Refills: 3 | Status: SHIPPED | OUTPATIENT
Start: 2019-01-29 | End: 2019-09-20 | Stop reason: SDUPTHER

## 2019-01-29 RX ADMIN — CLOPIDOGREL BISULFATE 75 MG: 75 TABLET ORAL at 13:25

## 2019-01-29 RX ADMIN — ASPIRIN 81 MG 81 MG: 81 TABLET ORAL at 13:25

## 2019-01-29 RX ADMIN — NIFEDIPINE 30 MG: 30 TABLET, FILM COATED, EXTENDED RELEASE ORAL at 13:25

## 2019-01-29 RX ADMIN — VALSARTAN AND HYDROCHLOROTHIAZIDE 1 TABLET: 25; 160 TABLET, FILM COATED ORAL at 13:43

## 2019-01-29 RX ADMIN — ATENOLOL 50 MG: 50 TABLET ORAL at 13:25

## 2019-01-29 ASSESSMENT — PAIN SCALES - GENERAL
PAINLEVEL_OUTOF10: 0

## 2019-02-14 ENCOUNTER — OFFICE VISIT (OUTPATIENT)
Dept: CARDIOLOGY CLINIC | Age: 79
End: 2019-02-14
Payer: MEDICARE

## 2019-02-14 VITALS
OXYGEN SATURATION: 96 % | WEIGHT: 259 LBS | SYSTOLIC BLOOD PRESSURE: 108 MMHG | DIASTOLIC BLOOD PRESSURE: 64 MMHG | HEART RATE: 61 BPM | HEIGHT: 70 IN | BODY MASS INDEX: 37.08 KG/M2

## 2019-02-14 DIAGNOSIS — I25.118 CORONARY ARTERY DISEASE OF NATIVE ARTERY OF NATIVE HEART WITH STABLE ANGINA PECTORIS (HCC): ICD-10-CM

## 2019-02-14 DIAGNOSIS — I48.0 PAF (PAROXYSMAL ATRIAL FIBRILLATION) (HCC): Primary | ICD-10-CM

## 2019-02-14 DIAGNOSIS — E78.2 MIXED HYPERLIPIDEMIA: ICD-10-CM

## 2019-02-14 DIAGNOSIS — I10 ESSENTIAL HYPERTENSION, BENIGN: ICD-10-CM

## 2019-02-14 LAB
CHOLESTEROL, TOTAL: 151 MG/DL (ref 0–199)
HDLC SERPL-MCNC: 34 MG/DL (ref 40–60)
LDL CHOLESTEROL CALCULATED: 99 MG/DL
LDL CHOLESTEROL DIRECT: 116 MG/DL
TRIGL SERPL-MCNC: 91 MG/DL (ref 0–150)
VLDLC SERPL CALC-MCNC: 18 MG/DL

## 2019-02-14 PROCEDURE — 1036F TOBACCO NON-USER: CPT | Performed by: INTERNAL MEDICINE

## 2019-02-14 PROCEDURE — G8427 DOCREV CUR MEDS BY ELIG CLIN: HCPCS | Performed by: INTERNAL MEDICINE

## 2019-02-14 PROCEDURE — G8484 FLU IMMUNIZE NO ADMIN: HCPCS | Performed by: INTERNAL MEDICINE

## 2019-02-14 PROCEDURE — 93000 ELECTROCARDIOGRAM COMPLETE: CPT | Performed by: INTERNAL MEDICINE

## 2019-02-14 PROCEDURE — G8598 ASA/ANTIPLAT THER USED: HCPCS | Performed by: INTERNAL MEDICINE

## 2019-02-14 PROCEDURE — G8417 CALC BMI ABV UP PARAM F/U: HCPCS | Performed by: INTERNAL MEDICINE

## 2019-02-14 PROCEDURE — 4040F PNEUMOC VAC/ADMIN/RCVD: CPT | Performed by: INTERNAL MEDICINE

## 2019-02-14 PROCEDURE — 99214 OFFICE O/P EST MOD 30 MIN: CPT | Performed by: INTERNAL MEDICINE

## 2019-02-14 PROCEDURE — 1123F ACP DISCUSS/DSCN MKR DOCD: CPT | Performed by: INTERNAL MEDICINE

## 2019-02-14 PROCEDURE — 1101F PT FALLS ASSESS-DOCD LE1/YR: CPT | Performed by: INTERNAL MEDICINE

## 2019-02-18 ENCOUNTER — TELEPHONE (OUTPATIENT)
Dept: CARDIOLOGY CLINIC | Age: 79
End: 2019-02-18

## 2019-02-22 ENCOUNTER — TELEPHONE (OUTPATIENT)
Dept: CARDIOLOGY CLINIC | Age: 79
End: 2019-02-22

## 2019-03-11 ENCOUNTER — TELEPHONE (OUTPATIENT)
Dept: CARDIOLOGY CLINIC | Age: 79
End: 2019-03-11

## 2019-03-18 ENCOUNTER — TELEPHONE (OUTPATIENT)
Dept: CARDIOLOGY CLINIC | Age: 79
End: 2019-03-18

## 2019-04-08 ENCOUNTER — TELEPHONE (OUTPATIENT)
Dept: CARDIOLOGY CLINIC | Age: 79
End: 2019-04-08

## 2019-04-08 NOTE — TELEPHONE ENCOUNTER
Modesta Drivers from [facility] called in stating that the patient was approved for Praulent. You can reach Modesta Drivers at [phone].

## 2019-06-17 RX ORDER — CLOPIDOGREL BISULFATE 75 MG/1
75 TABLET ORAL DAILY
Qty: 90 TABLET | Refills: 3 | Status: SHIPPED | OUTPATIENT
Start: 2019-06-17 | End: 2020-02-14 | Stop reason: ALTCHOICE

## 2019-07-03 ENCOUNTER — TELEPHONE (OUTPATIENT)
Dept: CARDIOLOGY CLINIC | Age: 79
End: 2019-07-03

## 2019-07-03 DIAGNOSIS — Z86.39 HISTORY OF HYPERLIPIDEMIA: Primary | ICD-10-CM

## 2019-07-03 DIAGNOSIS — E78.2 MIXED HYPERLIPIDEMIA: ICD-10-CM

## 2019-07-08 ENCOUNTER — TELEPHONE (OUTPATIENT)
Dept: CARDIOLOGY CLINIC | Age: 79
End: 2019-07-08

## 2019-08-14 ENCOUNTER — OFFICE VISIT (OUTPATIENT)
Dept: CARDIOLOGY CLINIC | Age: 79
End: 2019-08-14
Payer: MEDICARE

## 2019-08-14 VITALS
HEIGHT: 70 IN | HEART RATE: 62 BPM | DIASTOLIC BLOOD PRESSURE: 60 MMHG | OXYGEN SATURATION: 95 % | SYSTOLIC BLOOD PRESSURE: 115 MMHG | BODY MASS INDEX: 37.16 KG/M2

## 2019-08-14 DIAGNOSIS — E78.5 HYPERLIPIDEMIA LDL GOAL <70: ICD-10-CM

## 2019-08-14 DIAGNOSIS — I25.10 CAD IN NATIVE ARTERY: Primary | ICD-10-CM

## 2019-08-14 DIAGNOSIS — I48.0 PAF (PAROXYSMAL ATRIAL FIBRILLATION) (HCC): ICD-10-CM

## 2019-08-14 DIAGNOSIS — I10 ESSENTIAL HYPERTENSION: ICD-10-CM

## 2019-08-14 PROCEDURE — 93000 ELECTROCARDIOGRAM COMPLETE: CPT | Performed by: INTERNAL MEDICINE

## 2019-08-14 PROCEDURE — G8598 ASA/ANTIPLAT THER USED: HCPCS | Performed by: INTERNAL MEDICINE

## 2019-08-14 PROCEDURE — 1123F ACP DISCUSS/DSCN MKR DOCD: CPT | Performed by: INTERNAL MEDICINE

## 2019-08-14 PROCEDURE — 99214 OFFICE O/P EST MOD 30 MIN: CPT | Performed by: INTERNAL MEDICINE

## 2019-08-14 PROCEDURE — 1036F TOBACCO NON-USER: CPT | Performed by: INTERNAL MEDICINE

## 2019-08-14 PROCEDURE — G8427 DOCREV CUR MEDS BY ELIG CLIN: HCPCS | Performed by: INTERNAL MEDICINE

## 2019-08-14 PROCEDURE — 4040F PNEUMOC VAC/ADMIN/RCVD: CPT | Performed by: INTERNAL MEDICINE

## 2019-08-14 PROCEDURE — G8417 CALC BMI ABV UP PARAM F/U: HCPCS | Performed by: INTERNAL MEDICINE

## 2019-08-14 NOTE — PROGRESS NOTES
[facility] - Cardiology      Chief Complaint:     History of present illness:   Juanita Raman is a 69 y.o. male with past medical history significant for CAD s/p multiple stents, atrial fibrillation, hypertension, DVT and hyperlipidemia. Patient has had over the last several years, he's had several stents placed for symptoms and significant obstructive disease found on coronary angiography. Comes for routine check up. Has been using Praluent with great results. Reports itching, hot flashes and runny nose for 1 week after taking Praluent. He is fine with continuing the medication since \"the benefits outweigh the risks. \" Says he feels some skipped beats and is concerned. He also reports  back pain that he feels is musculoskeletal in nature. Says he has gained some weight, but remains on Weight Watcher's. In general, has been well. Today. enies any dyspnea, chest pain, palpitations and dizziness. Past Medical History:   Diagnosis Date    Anesthesia     hallucinations for prostate surgery     Arthritis     Atrial fibrillation ([facility] [address]     CAD (coronary artery disease)     Cancer (HCC)     Kidney    DVT (deep venous thrombosis) (HCC)     CORNELIUS legs post prostate surgery 5 yrs ago    Hx of blood clots     Hypertension      Past Surgical History:   Procedure Laterality Date    BACK SURGERY      3 surgeries total.  Lumbar 3&4,    CARDIAC CATHETERIZATION  2011    CARDIAC SURGERY      stents placed- 1 in 2000 and 2 in 2002.  3 surgeries total    CATARACT REMOVAL Bilateral     CHOLECYSTECTOMY      COLONOSCOPY      HERNIA REPAIR Left      X2.   Left flank and right inguinal    JOINT REPLACEMENT  2009    right knee replacement    KIDNEY SURGERY      tumor removed     NASAL SEPTUM SURGERY      PRESSURE ULCER DEBRIDEMENT      had a stage 4 wound on coccyx 6 years ago    PROSTATE SURGERY      2012-prostate removal due to enlargement (not cancerous)    TUMOR REMOVAL Left     kidney    VENA CAVA FILTER PLACEMENT      5 yrs ago     Social History     Tobacco Use    Smoking status: Former Smoker     Packs/day: 0.50     Years: 6.00     Pack years: 3.00     Types: Cigarettes     Last attempt to quit: 1966     Years since quittin.1    Smokeless tobacco: Never Used   Substance Use Topics    Alcohol use: No     Comment: occasional       Review of Systems:   Constitutional: No fatigue, weakness or significant change in weight. HEENT: No change in vision or ringing in the ears. Respiratory: No FINLEY, PND, orthopnea or cough. Cardiovascular: See HPI    GI: No n/v, abdominal pain or changes in bowel habits. No melena, no hematochezia  : No dysuria or hematuria. Skin: No rash or new skin lesions. Musculoskeletal: No new muscle or joint pain. Neurological: No syncope or TIA-like symptoms. Psychiatric: No anxiety, insomnia or depression    Physical Exam:  /60 (Site: Right Upper Arm, Position: Sitting, Cuff Size: Large Adult)   Pulse 62   Ht 5' 10\" (1.778 m)   SpO2 95%   BMI 37.16 kg/m²     General:  Awake, alert, oriented in NAD. Currently pain free  Skin:  Warm and dry. No unusual bruising or rash  Neck:  Supple. No JVD or carotid bruit appreciated  Chest:  Normal effort. Clear to auscultation, no wheezes/rhonchi/rales  Cardiovascular:  RRR, S1/S2, no murmur/gallop/rub.  Chest wall soreness reproducible on palpation and movement  Abdomen:  Soft, nontender, +bowel sounds  Extremities:  No edema  Neurological: No focal deficits  Psychological: Normal mood and affect      Current Outpatient Medications   Medication Sig Dispense Refill    clopidogrel (PLAVIX) 75 MG tablet TAKE 1 TABLET BY MOUTH DAILY 90 tablet 3    alirocumab (PRALUENT) 75 MG/ML SOPN injection pen Inject 1 mL into the skin every 14 days 1 mL 5    valsartan (DIOVAN) 160 MG tablet Take 1 tablet by mouth nightly 30 tablet 3    valsartan-hydrochlorothiazide (DIOVAN-HCT) 160-25 MG per lesion reduced from 80% to 0%. ECHO 12/9/2016   Normal LV size and systolic function: EF is  84%. Grade I diastolic  dysfunction.   Trace mitral regurgitation is present.   The left atrium is normal in size.   Normal right ventricular size and function.   There is trace to mild tricuspid regurgitation with RVSP estimated at 40  mmHg. which is mildly elevated.   Trivial pulmonic regurgitation present.     Coronary Angiogram and PCI: 12/2016  1. Two-vessel coronary disease with two tandem lesions in the 80-90% range involving the proximal circumflex. 2. An 80% eccentric mid RCA lesion. 3. Normal left ventricular size and systolic function. 4. Normal hemodynamics  Successful stenting of the proximal circumflex. Two tandem lesions were reduced to 0% using a 3.0-mm x 34-mm Synergy stent. The mid right coronary artery was stented using a 4.0-mm x 16-mm Synergy stent lesion reduced from 80% to 0%. ECHO: 1/27/2019  Normal left ventricle size and systolic function with an estimated ejection  fraction of 55-60%. No regional wall motion abnormalities are seen. Mild concentric left ventricular hypertrophy. Normal function of all valves. [facility]: 1/28/2019  CORONARY ANGIOGRAM:  1. There is single vessel disease in this right dominant circulation. 2.  Left main:  Free of disease. 3.  LAD in the proximal segment has 80% focal eccentric stenosis. The  first diagonal upper branch has a 95% stenosis. The rest of the vessel  has no major obstructive disease. 4.  Left circumflex artery. The previously stented site is widely  patent. The OM1 is patent as well. 5.  The right coronary artery also is a dominant vessel. The previously  placed stents are widely patent. There is mild luminal irregularities  in the distal RCA.     CONCLUSION:  1. Single vessel coronary artery disease with 80% proximal LAD and 95%  proximal D1 lesions. 2.  Normal left ventricular size and systolic function.   3.  Normal

## 2019-08-14 NOTE — PATIENT INSTRUCTIONS
injection pen for each injection. Give each injection into a different place on your body. You may need frequent blood tests. Do not shake this medicine. Prepare an injection only when you are ready to give it. Do not use if the medicine has changed colors, or has particles in it. Call your pharmacist for new medicine. Store this medicine in the refrigerator, do not freeze. Protect from light and high heat. Take the medicine out of the refrigerator and allow it to reach room temperature for 30 to 40 minutes before injecting your dose. Do not heat an injection pen or prefilled syringe,  and do not leave the pen at room temperature for longer than 24 hours. Each injection pen or prefilled syringe is for one use only. Throw it away after one use, even if there is still medicine left inside. Use a needle and syringe only once and then place them in a puncture-proof \"sharps\" container. Follow state or local laws about how to dispose of this container. Keep it out of the reach of children and pets. Alirocumab is only part of a complete treatment program that also includes diet, statin medication, and regular blood testing. Follow your doctor's instructions very closely. You should not stop using alirocumab without your doctor's advice, or your LDL cholesterol levels may increase. What happens if I miss a dose? If you inject once every 2 weeks: Use the missed dose within 7 days after the injection was due. Then go back to your original schedule and use the medicine again when your next scheduled dose is due. If you inject once every 4 weeks: Use the missed dose within 7 days after the injection was due. Then give the next injection 4 weeks later to start a new schedule based on the date you used the missed injection. Skip the missed dose if you are more than 7 days late for the injection. Do not use two doses at one time. What happens if I overdose?   Seek emergency medical attention or call the Poison Help

## 2019-08-27 RX ORDER — ATENOLOL 50 MG/1
50 TABLET ORAL 2 TIMES DAILY
Qty: 180 TABLET | Refills: 3 | Status: SHIPPED | OUTPATIENT
Start: 2019-08-27 | End: 2020-02-14

## 2019-09-20 RX ORDER — VALSARTAN 160 MG/1
TABLET ORAL
Qty: 90 TABLET | Refills: 1 | Status: SHIPPED | OUTPATIENT
Start: 2019-09-20 | End: 2020-03-31

## 2019-10-16 ENCOUNTER — TELEPHONE (OUTPATIENT)
Dept: CARDIOLOGY CLINIC | Age: 79
End: 2019-10-16

## 2020-01-23 RX ORDER — VALSARTAN AND HYDROCHLOROTHIAZIDE 160; 25 MG/1; MG/1
TABLET ORAL
Qty: 90 TABLET | Refills: 3 | Status: SHIPPED | OUTPATIENT
Start: 2020-01-23 | End: 2020-07-09 | Stop reason: ALTCHOICE

## 2020-01-24 ENCOUNTER — TELEPHONE (OUTPATIENT)
Dept: CARDIOLOGY CLINIC | Age: 80
End: 2020-01-24

## 2020-01-24 RX ORDER — NIFEDIPINE 30 MG/1
TABLET, FILM COATED, EXTENDED RELEASE ORAL
Qty: 90 TABLET | Refills: 5 | Status: SHIPPED | OUTPATIENT
Start: 2020-01-24 | End: 2020-01-27

## 2020-01-24 NOTE — TELEPHONE ENCOUNTER
Dr. Gauri Valero,     Mr. Shayne Prim Nifedipine (30 mg) is now a tier 3 medication and is too expensive. He would like an alternative. Please advise, thanks.

## 2020-01-27 RX ORDER — AMLODIPINE BESYLATE 10 MG/1
10 TABLET ORAL DAILY
Qty: 30 TABLET | Refills: 11 | Status: SHIPPED | OUTPATIENT
Start: 2020-01-27 | End: 2020-02-14 | Stop reason: SINTOL

## 2020-02-14 ENCOUNTER — OFFICE VISIT (OUTPATIENT)
Dept: CARDIOLOGY CLINIC | Age: 80
End: 2020-02-14
Payer: MEDICARE

## 2020-02-14 VITALS
HEART RATE: 64 BPM | HEIGHT: 70 IN | WEIGHT: 288 LBS | BODY MASS INDEX: 41.23 KG/M2 | SYSTOLIC BLOOD PRESSURE: 132 MMHG | OXYGEN SATURATION: 96 % | DIASTOLIC BLOOD PRESSURE: 68 MMHG

## 2020-02-14 PROCEDURE — G8427 DOCREV CUR MEDS BY ELIG CLIN: HCPCS | Performed by: INTERNAL MEDICINE

## 2020-02-14 PROCEDURE — 1036F TOBACCO NON-USER: CPT | Performed by: INTERNAL MEDICINE

## 2020-02-14 PROCEDURE — G8484 FLU IMMUNIZE NO ADMIN: HCPCS | Performed by: INTERNAL MEDICINE

## 2020-02-14 PROCEDURE — 1123F ACP DISCUSS/DSCN MKR DOCD: CPT | Performed by: INTERNAL MEDICINE

## 2020-02-14 PROCEDURE — 4040F PNEUMOC VAC/ADMIN/RCVD: CPT | Performed by: INTERNAL MEDICINE

## 2020-02-14 PROCEDURE — 93000 ELECTROCARDIOGRAM COMPLETE: CPT | Performed by: INTERNAL MEDICINE

## 2020-02-14 PROCEDURE — G8417 CALC BMI ABV UP PARAM F/U: HCPCS | Performed by: INTERNAL MEDICINE

## 2020-02-14 PROCEDURE — 99214 OFFICE O/P EST MOD 30 MIN: CPT | Performed by: INTERNAL MEDICINE

## 2020-02-14 RX ORDER — VITAMIN B COMPLEX
1 CAPSULE ORAL DAILY
COMMUNITY

## 2020-02-14 RX ORDER — ATENOLOL 50 MG/1
50 TABLET ORAL DAILY
Qty: 90 TABLET | Refills: 3
Start: 2020-02-14 | End: 2020-07-31

## 2020-02-14 RX ORDER — FAMOTIDINE 20 MG/1
20 TABLET, FILM COATED ORAL 2 TIMES DAILY
COMMUNITY
End: 2020-12-11

## 2020-02-14 NOTE — PROGRESS NOTES
[facility] - Cardiology      Chief Complaint:     History of present illness:   Mekhi Carrion is a 69 y.o. male with past medical history significant for CAD s/p multiple stents, atrial fibrillation, hypertension, DVT and hyperlipidemia. Patient has had over the last several years, he's had several stents placed for symptoms and significant obstructive disease found on coronary angiography. Interval History: Today he presents to office for follow up for management of his CAD, atrial fibrillation, hypertension, DVT and hyperlipidemia. He reports the he has to redo paperwork to get assistance to help cover the cost of his Praluuent He states he has been feeling fatigued. He reports that he drinks a lot of fluid and will wake up about three times a night to urinate. He states the he has swelling in his lower legs. He reports he has been diagnosed with CA in his left kidney he is following with Dr Kevin Mclain and he is due to have a CAT scan in April to further evaluated. He states he only took his amlodipine for 3 days then stopped, due to SE of swelling BLE. Past Medical History:   Diagnosis Date    Anesthesia     hallucinations for prostate surgery     Arthritis     Atrial fibrillation ([address]     CAD (coronary artery disease)     Cancer (HCC)     Kidney    DVT (deep venous thrombosis) (HCC)     CORNELIUS legs post prostate surgery 5 yrs ago    Hx of blood clots     Hypertension      Past Surgical History:   Procedure Laterality Date    BACK SURGERY      3 surgeries total.  Lumbar 3&4,    CARDIAC CATHETERIZATION  2011    CARDIAC SURGERY      stents placed- 1 in 2000 and 2 in 2002.  3 surgeries total    CATARACT REMOVAL Bilateral     CHOLECYSTECTOMY      COLONOSCOPY      HERNIA REPAIR Left      X2.   Left flank and right inguinal    JOINT REPLACEMENT  2009    right knee replacement    KIDNEY SURGERY      tumor removed     NASAL SEPTUM SURGERY      PRESSURE ULCER DEBRIDEMENT      had a stage 4 wound on coccyx 6 years ago    PROSTATE SURGERY      2012-prostate removal due to enlargement (not cancerous)    TUMOR REMOVAL Left     kidney    VENA CAVA FILTER PLACEMENT      5 yrs ago     Social History     Tobacco Use    Smoking status: Former Smoker     Packs/day: 0.50     Years: 6.00     Pack years: 3.00     Types: Cigarettes     Last attempt to quit: 1966     Years since quittin.6    Smokeless tobacco: Never Used   Substance Use Topics    Alcohol use: No     Comment: occasional       Review of Systems:   Constitutional: No fatigue, weakness or significant change in weight. HEENT: No change in vision or ringing in the ears. Respiratory: No FINLEY, PND, orthopnea or cough. Cardiovascular: See HPI    GI: No n/v, abdominal pain or changes in bowel habits. No melena, no hematochezia  : No dysuria or hematuria. Skin: No rash or new skin lesions. Musculoskeletal: No new muscle or joint pain. Neurological: No syncope or TIA-like symptoms. Psychiatric: No anxiety, insomnia or depression    Physical Exam:  /68   Pulse 64   Ht 5' 10\" (1.778 m)   Wt 288 lb (130.6 kg) Comment: did not wish to remove shoes  SpO2 96%   BMI 41.32 kg/m²     General:  Awake, alert, oriented in NAD. Currently pain free  Skin:  Warm and dry. No unusual bruising or rash  Neck:  Supple. No JVD or carotid bruit appreciated  Chest:  Normal effort. Clear to auscultation, no wheezes/rhonchi/rales  Cardiovascular:  RRR, S1/S2, no murmur/gallop/rub.  Chest wall soreness reproducible on palpation and movement  Abdomen:  Soft, nontender, +bowel sounds  Extremities:  No edema  Neurological: No focal deficits  Psychological: Normal mood and affect      Current Outpatient Medications   Medication Sig Dispense Refill    famotidine (PEPCID) 20 MG tablet Take 20 mg by mouth 2 times daily      b complex vitamins capsule Take 1 capsule by mouth daily      valsartan-hydrochlorothiazide systolic function. 3.  Normal hemodynamics. TECHNICAL COMMENTS:  The guide engaged the ostium well and provided good  support. Direct stenting of the proximal LAD was made. Serial  inflation up to 16 atmospheres which corresponds to a vessel size of  approximately 3.2. Final angiography 0% residual.  Next, the wire was  removed and passed into the diagonal after predilatation. A 2.25 x  12-mm stent was deployed and postdilated to a vessel size of 2.5. Followup angiography shows 0% residual.    CONCLUSION:  1. Successful stenting of the proximal LAD lesion reduced from 80% to 0%. A 3.0 x 15 ISAIAS stent used. 2.  Successful stenting of the first diagonal.  A 2.25 x 12 ISAIAS stent used to a final diameter of 2.5. Assessment and Plan:      Mr. Alanis Melaras history of paroxysmal atrial fibrillation. For various reasons he is not on anticoagulation. I would like to try it again without the Plavix. It has been over a year since he had a stent placed. On the other hand I am concerned about PAF causing stroke. I will therefore like to try him on Xarelto after he discontinues Plavix. For 1 week he would only take 10 mg of Xarelto and then start at 20 mg. The reason for the lower dose is because of his past history of hematuria on Xarelto and antiplatelet drug. CAD. Cath 10/2006: previous stents to proximal RCA, prox Circumfles and mid LAD  Non obstructive CAD of mid-RCA and Diag 3  12/2016: stenting to mid RCA and proximal circumflex  1/2019: stent to proximal LAD and 1st diagonal   Presented in different ways each time  Denies chest pain, pressure. Stop Plavix today, Continue Asprin 81 mg daily. Essential Hypertension. (Over age 62, goal BP <150/90.)  Has been labile in the past  Stable today   Continue current medical management. Paroxysmal atrial fibrillation. EKG today shows SR.  CHADS-Vasc score 3 (HTN, AGE, CAD).    Xarelto stopped due to hematuria; Tried Eliquis 2.5 bid but too expensive. Also tried Pradaxa; hematuria  Patient will not take Warfarin because of diffficulty in management and admissions to hospital for coagulopathy from time to time. Not a candidate for Watchman given his difficult anatomy. Have discussed in detail risk of stroke. I discussed oral anticoagulation to decrease the risk of thromboembolic events including stroke. Benefits and alternatives were discussed with patient. Risk of bleeding was discussed. Patient verbalized understanding. I will stop Plavix today and retrial on Xarelto 20 mg daily for thromboembolic risk reduction. Instructed to take half a tablet daily for one week then increase to a full 20 mg daily. Creatine 1.0 on 1/29/2019. He is currently on Atenolol 50 MG BID for rate control and anti remodeling. He reports feeling fatigued. I instructed patient to decrease Atenolol to 50 mg daily to with goal of improving his fatigue. Instructed to continue monitoring BP and HR at home and call for elevated BP       Hyperlipidemia goal LDL <70  No statin therapy  Severe intolerance to statins (myalgias)  Started Praluent with some side effects; itching, runny nose, hot flashes; tolerable for now  Discussed and benefits outweigh risks so he will continue taking and call if these symptoms worsens. Patient reports that it is time to reapply for patient assistance to help cover the cost of his Praluent. Follow up in 6 months     Thank you for allowing me to participate in the care of your patient. Janel Fernandez MD     This note was scribed in the presence of Dr. Janel Fernandez MD by Stephen Guzman RN. Physician Attestation:  The scribes documentation has been prepared under my direction and personally reviewed by me in its entirety. I confirm that the note above accurately reflects all work, treatment, procedures, and medical decision making performed by me.

## 2020-02-27 ENCOUNTER — TELEPHONE (OUTPATIENT)
Dept: CARDIOLOGY CLINIC | Age: 80
End: 2020-02-27

## 2020-03-09 RX ORDER — RIVAROXABAN 20 MG/1
TABLET, FILM COATED ORAL
Qty: 30 TABLET | Refills: 1 | Status: SHIPPED | OUTPATIENT
Start: 2020-03-09 | End: 2020-03-16 | Stop reason: SDUPTHER

## 2020-03-17 ENCOUNTER — TELEPHONE (OUTPATIENT)
Dept: CARDIOLOGY CLINIC | Age: 80
End: 2020-03-17

## 2020-03-17 NOTE — TELEPHONE ENCOUNTER
Called Praluent PASS program. Patient will need to sign a new form for 2020. I downloaded and printed off form, completely filled out. Called patient to notify that he will need to sign form before faxing to PASS program.     Also notified by Phil Hurt at [employer] program that patient will need a new PA. Called patient to notify and he asked that forms be mailed to his home. He will mail back once completed and signed. He also asked that we send form for his wife Valerie Anton, who is also taking Praluent.

## 2020-04-01 RX ORDER — VALSARTAN 160 MG/1
TABLET ORAL
Qty: 90 TABLET | Refills: 1 | Status: SHIPPED | OUTPATIENT
Start: 2020-04-01 | End: 2020-07-09 | Stop reason: ALTCHOICE

## 2020-04-14 RX ORDER — CHOLECALCIFEROL (VITAMIN D3) 10 MCG
500 TABLET ORAL
COMMUNITY
End: 2022-10-12

## 2020-07-06 RX ORDER — CLOPIDOGREL BISULFATE 75 MG/1
TABLET ORAL
Qty: 90 TABLET | Refills: 3 | Status: SHIPPED | OUTPATIENT
Start: 2020-07-06 | End: 2022-06-30 | Stop reason: SDUPTHER

## 2020-07-07 ENCOUNTER — TELEPHONE (OUTPATIENT)
Dept: CARDIOLOGY CLINIC | Age: 80
End: 2020-07-07

## 2020-07-07 NOTE — TELEPHONE ENCOUNTER
[facility] is asking for clarification on valsartan 160 mg. Is patient supposed to be taking valsartan 160 mg or valsartan - HCTZ 160-25 mg?     Please send clarification to [facility].    [phone]

## 2020-07-08 NOTE — TELEPHONE ENCOUNTER
That does not make sense that the pharmacy does not have the drug in stock  They can get it    If not he can take losartan hydrochlorothiazide 100/25  But it is not the same it is a week or drug

## 2020-07-09 RX ORDER — LOSARTAN POTASSIUM AND HYDROCHLOROTHIAZIDE 12.5; 1 MG/1; MG/1
1 TABLET ORAL DAILY
Qty: 30 TABLET | Refills: 0 | Status: SHIPPED | OUTPATIENT
Start: 2020-07-09 | End: 2020-07-13

## 2020-07-09 RX ORDER — LOSARTAN POTASSIUM 100 MG/1
100 TABLET ORAL DAILY
Qty: 30 TABLET | Refills: 0 | Status: SHIPPED | OUTPATIENT
Start: 2020-07-09 | End: 2020-07-13

## 2020-07-09 NOTE — TELEPHONE ENCOUNTER
Please call patient to notify of Dr. Linder Los recommendations. If he is fine with switching, please be sure to send Rx for Losartan 100 mg tablets 1 tablet QD AND Losartan-HCTZ 100-25 mg tablets 1 tablet QD or QPM, thanks.

## 2020-07-10 ENCOUNTER — TELEPHONE (OUTPATIENT)
Dept: CARDIOLOGY CLINIC | Age: 80
End: 2020-07-10

## 2020-07-13 RX ORDER — VALSARTAN AND HYDROCHLOROTHIAZIDE 160; 25 MG/1; MG/1
1 TABLET ORAL DAILY
Qty: 90 TABLET | Refills: 5 | Status: SHIPPED | OUTPATIENT
Start: 2020-07-13 | End: 2021-06-11

## 2020-07-13 RX ORDER — VALSARTAN 160 MG/1
160 TABLET ORAL DAILY
Qty: 90 TABLET | Refills: 5 | Status: SHIPPED | OUTPATIENT
Start: 2020-07-13 | End: 2020-08-17

## 2020-07-13 NOTE — TELEPHONE ENCOUNTER
Patient states his Bp has been high every morning. His BP over the weekend are: 204/113 7/10 at 9 am and took 2 nitro 168/99, 825 pm 169/94. 1130 pm 152/85. 7/11 197/105 at 8 am, took his meds and later at 1010 am 166/85. He did not take Nitros this day. 7/12 730 am 200/108, 204/99 at 10 am it was 182/91 after he took his medications. This morning his BP is 194/107.

## 2020-07-31 RX ORDER — LOSARTAN POTASSIUM AND HYDROCHLOROTHIAZIDE 12.5; 1 MG/1; MG/1
TABLET ORAL
Qty: 30 TABLET | Refills: 5 | Status: SHIPPED | OUTPATIENT
Start: 2020-07-31 | End: 2020-08-17

## 2020-07-31 RX ORDER — LOSARTAN POTASSIUM 50 MG/1
TABLET ORAL
Qty: 60 TABLET | Refills: 0 | OUTPATIENT
Start: 2020-07-31

## 2020-07-31 RX ORDER — ATENOLOL 50 MG/1
TABLET ORAL
Qty: 180 TABLET | Refills: 3 | Status: SHIPPED | OUTPATIENT
Start: 2020-07-31 | End: 2021-06-11 | Stop reason: SDUPTHER

## 2020-08-12 NOTE — PROGRESS NOTES
[address] [facility] - Cardiology      Chief Complaint:     History of present illness:   Taylor Wisdom is a 79 y.o. male with past medical history significant for CAD s/p multiple stents, atrial fibrillation, hypertension, DVT and hyperlipidemia. Patient has had over the last several years, he's had several stents placed for symptoms and significant obstructive disease found on coronary angiography. Following with Dr. Benita Samayoa for dx of left kidney CA. Comes for routine check up. Says he had some issues with his medication being on recall. Says he never heard back about PASS program for Praluent. He has no new complaints today. In general, doing well. Past Medical History:   Diagnosis Date    Anesthesia     hallucinations for prostate surgery     Arthritis     Atrial fibrillation ([facility] [address]     CAD (coronary artery disease)     Cancer (HCC)     Kidney    DVT (deep venous thrombosis) (HCC)     CORNELIUS legs post prostate surgery 5 yrs ago    Hx of blood clots     Hypertension      Past Surgical History:   Procedure Laterality Date    BACK SURGERY      3 surgeries total.  Lumbar 3&4,    CARDIAC CATHETERIZATION  2011    CARDIAC SURGERY      stents placed- 1 in 2000 and 2 in 2002.  3 surgeries total    CATARACT REMOVAL Bilateral     CHOLECYSTECTOMY      COLONOSCOPY      HERNIA REPAIR Left      X2.   Left flank and right inguinal    JOINT REPLACEMENT  2009    right knee replacement    KIDNEY SURGERY      tumor removed     NASAL SEPTUM SURGERY      PRESSURE ULCER DEBRIDEMENT      had a stage 4 wound on coccyx 6 years ago    PROSTATE SURGERY      2012-prostate removal due to enlargement (not cancerous)    TUMOR REMOVAL Left     kidney    VENA CAVA FILTER PLACEMENT      5 yrs ago     Social History     Tobacco Use    Smoking status: Former Smoker     Packs/day: 0.50     Years: 6.00     Pack years: 3.00     Types: Cigarettes     Last attempt to quit: 7/20/1966 Years since quittin.1    Smokeless tobacco: Never Used   Substance Use Topics    Alcohol use: No       Review of Systems:   Constitutional: No fatigue, weakness or significant change in weight. HEENT: No change in vision or ringing in the ears. Respiratory: No FINLEY, PND, orthopnea or cough. Cardiovascular: See HPI    GI: No n/v, abdominal pain or changes in bowel habits. No melena, no hematochezia  : No dysuria or hematuria. Skin: No rash or new skin lesions. Musculoskeletal: No new muscle or joint pain. Neurological: No syncope or TIA-like symptoms. Psychiatric: No anxiety, insomnia or depression    Physical Exam:  /78   Pulse 65   Temp 98 °F (36.7 °C)   Ht 5' 10\" (1.778 m)   Wt 294 lb (133.4 kg)   SpO2 96%   BMI 42.18 kg/m²     General:  Awake, alert, oriented in NAD. Currently pain free  Skin:  Warm and dry. No unusual bruising or rash  Neck:  Supple. No JVD or carotid bruit appreciated  Chest:  Normal effort. Clear to auscultation, no wheezes/rhonchi/rales  Cardiovascular:  RRR, S1/S2, no murmur/gallop/rub.  Chest wall soreness reproducible on palpation and movement  Abdomen:  Soft, nontender, +bowel sounds  Extremities:  No edema  Neurological: No focal deficits  Psychological: Normal mood and affect      Current Outpatient Medications   Medication Sig Dispense Refill    hydrALAZINE (APRESOLINE) 10 MG tablet Take 10 mg by mouth 3 times daily      atenolol (TENORMIN) 50 MG tablet TAKE 1 TABLET BY MOUTH TWICE A  tablet 3    valsartan-hydroCHLOROthiazide (DIOVAN-HCT) 160-25 MG per tablet Take 1 tablet by mouth daily In AM 90 tablet 5    clopidogrel (PLAVIX) 75 MG tablet TAKE 1 TABLET BY MOUTH EVERY DAY 90 tablet 3    Magnesium Gluconate 27.5 MG TABS Take 500 mg by mouth      famotidine (PEPCID) 20 MG tablet Take 20 mg by mouth 2 times daily      b complex vitamins capsule Take 1 capsule by mouth daily      aspirin 81 MG tablet Take 81 mg by mouth daily      Cholecalciferol (VITAMIN D3) 2000 UNITS CAPS Take 1 capsule by mouth daily       calcium carbonate (OSCAL) 500 MG TABS tablet Take 500 mg by mouth daily      nitroGLYCERIN (NITROSTAT) 0.4 MG SL tablet Place 1 tablet under the tongue every 5 minutes as needed for Chest pain 25 tablet 3     No current facility-administered medications for this visit. Labs:   Lab Results   Component Value Date    WBC 8.7 01/29/2019    HGB 14.8 01/29/2019    HCT 44.4 01/29/2019    MCV 90.1 01/29/2019     01/29/2019     Lab Results   Component Value Date     01/29/2019    K 3.8 01/29/2019     01/29/2019    CO2 25 01/29/2019    BUN 19 01/29/2019    CREATININE 1.0 01/29/2019    GLUCOSE 97 01/29/2019    CALCIUM 9.3 01/29/2019      Lab Results   Component Value Date    TRIG 113 07/07/2019    HDL 36 07/07/2019    HDL 34 07/20/2011    LDLCALC 39 07/07/2019    LDLDIRECT 116 02/14/2019    LABVLDL 18 02/14/2019       EKG    08/17/20  NSR          CATH 12/9/16  1. Two-vessel coronary disease with two tandem lesions in the 80-90% range involving the proximal circumflex. 2. An 80% eccentric mid RCA lesion. 3. Normal left ventricular size and systolic function. 4. Normal hemodynamics.     Successful stenting of the proximal circumflex. Two tandem lesions were reduced to 0% using a 3.0-mm x 34-mm Synergy stent. The mid right coronary artery was stented using a 4.0-mm x 16-mm Synergy stent lesion reduced from 80% to 0%. ECHO 12/9/2016   Normal LV size and systolic function: EF is  95%. Grade I diastolic  dysfunction.   Trace mitral regurgitation is present.   The left atrium is normal in size.   Normal right ventricular size and function.   There is trace to mild tricuspid regurgitation with RVSP estimated at 40  mmHg. which is mildly elevated.   Trivial pulmonic regurgitation present.     Coronary Angiogram and PCI: 12/2016  1.  Two-vessel coronary disease with two tandem lesions in the 80-90% range involving the proximal circumflex. 2. An 80% eccentric mid RCA lesion. 3. Normal left ventricular size and systolic function. 4. Normal hemodynamics  Successful stenting of the proximal circumflex. Two tandem lesions were reduced to 0% using a 3.0-mm x 34-mm Synergy stent. The mid right coronary artery was stented using a 4.0-mm x 16-mm Synergy stent lesion reduced from 80% to 0%. ECHO: 1/27/2019  Normal left ventricle size and systolic function with an estimated ejection  fraction of 55-60%. No regional wall motion abnormalities are seen. Mild concentric left ventricular hypertrophy. Normal function of all valves. C: 1/28/2019  CORONARY ANGIOGRAM:  1. There is single vessel disease in this right dominant circulation. 2.  Left main:  Free of disease. 3.  LAD in the proximal segment has 80% focal eccentric stenosis. The  first diagonal upper branch has a 95% stenosis. The rest of the vessel  has no major obstructive disease. 4.  Left circumflex artery. The previously stented site is widely  patent. The OM1 is patent as well. 5.  The right coronary artery also is a dominant vessel. The previously  placed stents are widely patent. There is mild luminal irregularities  in the distal RCA.     CONCLUSION:  1. Single vessel coronary artery disease with 80% proximal LAD and 95%  proximal D1 lesions. 2.  Normal left ventricular size and systolic function. 3.  Normal hemodynamics. TECHNICAL COMMENTS:  The guide engaged the ostium well and provided good  support. Direct stenting of the proximal LAD was made. Serial  inflation up to 16 atmospheres which corresponds to a vessel size of  approximately 3.2. Final angiography 0% residual.  Next, the wire was  removed and passed into the diagonal after predilatation. A 2.25 x  12-mm stent was deployed and postdilated to a vessel size of 2.5. Followup angiography shows 0% residual.    CONCLUSION:  1.   Successful stenting of the proximal LAD lesion reduced from 80% to 0%. A 3.0 x 15 ISAIAS stent used. 2.  Successful stenting of the first diagonal.  A 2.25 x 12 ISAIAS stent used to a final diameter of 2.5. Assessment and Plan:    CAD. Cath 10/2006: previous stents to proximal RCA, prox Circumfles and mid LAD  Non obstructive CAD of mid-RCA and Diag 3  12/2016: stenting to mid RCA and proximal circumflex  1/2019: stent to proximal LAD and 1st diagonal   Presented in different ways each time  Denies chest pain, pressure. Stop Plavix today, Continue Asprin 81 mg daily. Essential Hypertension. (Over age 62, goal BP <150/90.)  Has been labile in the past  BP is stable today     Paroxysmal atrial fibrillation. EKG today shows SR.  CHADS-Vasc score 3 (HTN, AGE, CAD). Xarelto stopped due to hematuria; Tried Eliquis 2.5 bid but too expensive in addition to bleeding; hematuria   Also tried Pradaxa; hematuria  Patient will not take Warfarin because of diffficulty in management and admissions to hospital for coagulopathy from time to time. Besides that also causes hematuria. Not a candidate for Watchman given his difficult anatomy. Have discussed in detail risk of stroke. Discussed oral anticoagulation to decrease the risk of thromboembolic events including stroke. Benefits and alternatives were discussed with patient. Risk of bleeding was discussed. Patient verbalized understanding. Tried Xarelto again and patient stopped taking due to recurrence of bleeding. Has since resumed Plavix      Hyperlipidemia goal LDL <70  No statin therapy  Severe intolerance to statins (myalgias)  Started Praluent with some side effects; itching, runny nose, hot flashes; tolerable for now  Discussed and benefits outweigh risks so he will continue taking and call if these symptoms worsens. Re-applied for Praluent that was approved with high co-pay.  Will check into patient assistance again    Follow up in 6 months     Thank you for allowing me to participate in the care of your patient. Ambrosio Isbell MD     This note was scribed in the presence of Dr. Ambrosio Isbell MD by Yvno Blackburn    Physician Attestation:  The scribes documentation has been prepared under my direction and personally reviewed by me in its entirety. I confirm that the note above accurately reflects all work, treatment, procedures, and medical decision making performed by me.

## 2020-08-17 ENCOUNTER — OFFICE VISIT (OUTPATIENT)
Dept: CARDIOLOGY CLINIC | Age: 80
End: 2020-08-17
Payer: MEDICARE

## 2020-08-17 VITALS
TEMPERATURE: 98 F | WEIGHT: 294 LBS | BODY MASS INDEX: 42.09 KG/M2 | DIASTOLIC BLOOD PRESSURE: 78 MMHG | OXYGEN SATURATION: 96 % | HEIGHT: 70 IN | HEART RATE: 65 BPM | SYSTOLIC BLOOD PRESSURE: 138 MMHG

## 2020-08-17 PROCEDURE — 1123F ACP DISCUSS/DSCN MKR DOCD: CPT | Performed by: INTERNAL MEDICINE

## 2020-08-17 PROCEDURE — G8417 CALC BMI ABV UP PARAM F/U: HCPCS | Performed by: INTERNAL MEDICINE

## 2020-08-17 PROCEDURE — 99214 OFFICE O/P EST MOD 30 MIN: CPT | Performed by: INTERNAL MEDICINE

## 2020-08-17 PROCEDURE — G8427 DOCREV CUR MEDS BY ELIG CLIN: HCPCS | Performed by: INTERNAL MEDICINE

## 2020-08-17 PROCEDURE — 93000 ELECTROCARDIOGRAM COMPLETE: CPT | Performed by: INTERNAL MEDICINE

## 2020-08-17 PROCEDURE — 4040F PNEUMOC VAC/ADMIN/RCVD: CPT | Performed by: INTERNAL MEDICINE

## 2020-08-17 PROCEDURE — 1036F TOBACCO NON-USER: CPT | Performed by: INTERNAL MEDICINE

## 2020-08-17 RX ORDER — HYDRALAZINE HYDROCHLORIDE 10 MG/1
10 TABLET, FILM COATED ORAL 3 TIMES DAILY
COMMUNITY
End: 2020-11-16

## 2020-11-03 PROBLEM — R07.9 CHEST PAIN: Status: RESOLVED | Noted: 2020-11-03 | Resolved: 2020-11-03

## 2020-11-13 ENCOUNTER — TELEPHONE (OUTPATIENT)
Dept: CARDIOLOGY CLINIC | Age: 80
End: 2020-11-13

## 2020-11-13 NOTE — TELEPHONE ENCOUNTER
Called and spoke with patient regarding his symptoms. He describes the pain as pressure, pain does not radiate. Denies any associated symptoms of shortness of breath, nausea or diaphoresis. I instructed him to take nitroglycerin right away if CP returns and repeat up to three times 5 min apart and if unrelieved seek treatment at the ED. Verbalized understanding  Appointment made to see Dr Cata Panchal on Monday 11/16/2020.

## 2020-11-16 ENCOUNTER — OFFICE VISIT (OUTPATIENT)
Dept: CARDIOLOGY CLINIC | Age: 80
End: 2020-11-16
Payer: MEDICARE

## 2020-11-16 ENCOUNTER — TELEPHONE (OUTPATIENT)
Dept: CARDIOLOGY CLINIC | Age: 80
End: 2020-11-16

## 2020-11-16 VITALS
OXYGEN SATURATION: 96 % | SYSTOLIC BLOOD PRESSURE: 147 MMHG | TEMPERATURE: 97.8 F | BODY MASS INDEX: 42.4 KG/M2 | WEIGHT: 296.2 LBS | DIASTOLIC BLOOD PRESSURE: 70 MMHG | HEART RATE: 63 BPM | HEIGHT: 70 IN

## 2020-11-16 DIAGNOSIS — E78.5 HYPERLIPIDEMIA, UNSPECIFIED HYPERLIPIDEMIA TYPE: ICD-10-CM

## 2020-11-16 DIAGNOSIS — I25.119 CORONARY ARTERY DISEASE INVOLVING NATIVE CORONARY ARTERY OF NATIVE HEART WITH ANGINA PECTORIS (HCC): ICD-10-CM

## 2020-11-16 DIAGNOSIS — R07.89 CHEST PRESSURE: ICD-10-CM

## 2020-11-16 DIAGNOSIS — R07.0 THROAT BURNING: ICD-10-CM

## 2020-11-16 LAB
ANION GAP SERPL CALCULATED.3IONS-SCNC: 9 MMOL/L (ref 3–16)
BUN BLDV-MCNC: 21 MG/DL (ref 7–20)
CALCIUM SERPL-MCNC: 10.9 MG/DL (ref 8.3–10.6)
CHLORIDE BLD-SCNC: 97 MMOL/L (ref 99–110)
CHOLESTEROL, TOTAL: 190 MG/DL (ref 0–199)
CO2: 34 MMOL/L (ref 21–32)
CREAT SERPL-MCNC: 1.2 MG/DL (ref 0.8–1.3)
GFR AFRICAN AMERICAN: >60
GFR NON-AFRICAN AMERICAN: 58
GLUCOSE BLD-MCNC: 105 MG/DL (ref 70–99)
HCT VFR BLD CALC: 48.2 % (ref 40.5–52.5)
HDLC SERPL-MCNC: 31 MG/DL (ref 40–60)
HEMOGLOBIN: 15.8 G/DL (ref 13.5–17.5)
LDL CHOLESTEROL CALCULATED: ABNORMAL MG/DL
LDL CHOLESTEROL DIRECT: 126 MG/DL
MCH RBC QN AUTO: 29.8 PG (ref 26–34)
MCHC RBC AUTO-ENTMCNC: 32.7 G/DL (ref 31–36)
MCV RBC AUTO: 91.2 FL (ref 80–100)
PDW BLD-RTO: 15.8 % (ref 12.4–15.4)
PLATELET # BLD: 179 K/UL (ref 135–450)
PMV BLD AUTO: 8 FL (ref 5–10.5)
POTASSIUM SERPL-SCNC: 3.9 MMOL/L (ref 3.5–5.1)
RBC # BLD: 5.28 M/UL (ref 4.2–5.9)
SODIUM BLD-SCNC: 140 MMOL/L (ref 136–145)
TRIGL SERPL-MCNC: 329 MG/DL (ref 0–150)
VLDLC SERPL CALC-MCNC: ABNORMAL MG/DL
WBC # BLD: 9.7 K/UL (ref 4–11)

## 2020-11-16 PROCEDURE — 93000 ELECTROCARDIOGRAM COMPLETE: CPT | Performed by: INTERNAL MEDICINE

## 2020-11-16 PROCEDURE — G8417 CALC BMI ABV UP PARAM F/U: HCPCS | Performed by: INTERNAL MEDICINE

## 2020-11-16 PROCEDURE — 99215 OFFICE O/P EST HI 40 MIN: CPT | Performed by: INTERNAL MEDICINE

## 2020-11-16 PROCEDURE — 4040F PNEUMOC VAC/ADMIN/RCVD: CPT | Performed by: INTERNAL MEDICINE

## 2020-11-16 PROCEDURE — 1123F ACP DISCUSS/DSCN MKR DOCD: CPT | Performed by: INTERNAL MEDICINE

## 2020-11-16 PROCEDURE — G8427 DOCREV CUR MEDS BY ELIG CLIN: HCPCS | Performed by: INTERNAL MEDICINE

## 2020-11-16 PROCEDURE — G8484 FLU IMMUNIZE NO ADMIN: HCPCS | Performed by: INTERNAL MEDICINE

## 2020-11-16 PROCEDURE — 1036F TOBACCO NON-USER: CPT | Performed by: INTERNAL MEDICINE

## 2020-11-16 NOTE — TELEPHONE ENCOUNTER
LEFT HEART CATHETERIZATION WITH DR. LYNCH AT [facility]  The morning of your procedure you will park in the hospital parking lot, come in main entrance and then report directly to the cath lab to check in. DATE: Tuesday 11/17/20  TIME: 10:00 a.m   ARRIVAL TIME: 8:30 a.m. Pre-Procedure Instructions   1. You will need to fast for at least 8 hours prior to procedure. 2. No caffeine the morning of.   3. All other medications can be taken in the morning with sips of water-including Plavix and Aspirin. 4. Do not use any lotions, creams or perfume the morning of procedure. 5. Pre-procedure lab work will need to be completed 11/16/20  6. Please have a responsible adult to drive you home after procedure. We advise you have someone to stay with you for 24 hours following procedure for precautionary measures. Depending on procedure you may require an overnight stay. 7. Cath lab will provide you with all post procedure instructions. If you have any questions regarding the procedure itself or medications, please call [phone] and ask to speak with a nurse. Published on [email or web address] and e-mail to [email or web address]. Spoke with Shawanda Roger on the phone.

## 2020-11-16 NOTE — PATIENT INSTRUCTIONS
LEFT HEART CATHETERIZATION WITH DR. LYNCH AT [facility]  The morning of your procedure you will park in the hospital parking lot, come in main entrance and then report directly to the cath lab to check in. DATE: Tuesday 11/17/20  TIME: 10:00 a.m   ARRIVAL TIME: 8:30 a.m. Pre-Procedure Instructions   1. You will need to fast for at least 8 hours prior to procedure. 2. No caffeine the morning of.   3. All other medications can be taken in the morning with sips of water-including Plavix and Aspirin. 4. Do not use any lotions, creams or perfume the morning of procedure. 5. Pre-procedure lab work will need to be completed 11/16/20  6. Please have a responsible adult to drive you home after procedure. We advise you have someone to stay with you for 24 hours following procedure for precautionary measures. Depending on procedure you may require an overnight stay. 7. Cath lab will provide you with all post procedure instructions. If you have any questions regarding the procedure itself or medications, please call [phone] and ask to speak with a nurse. Patient Education        Left Heart Catheterization: About This Test  What is it? Left heart catheterization is a test to check the left side of your heart. Your doctor might look at the shape of your heart, the motion of your heart, or the blood pressure inside the chambers. Why is this test done? This test gives information about how your heart is working. It can:  · Check blood flow and blood pressure in the chambers of the heart. · Check the pumping action of the heart. · Find out if a heart defect is present and how severe it is. · Find out how well the heart valves work. How is the test done? · You will get medicine to help you relax. · A thin tube called a catheter is put into a blood vessel in the groin or the arm. The doctor moves the catheter through the blood vessel into your heart.   · You will get a shot to numb the skin where the catheter goes in. · Dye may be injected into your heart. Your doctor can watch on special monitors as the dye moves in your heart. The dye helps your doctor see blood flow in your heart. · If a heart defect is found, cardiac catheterization sometimes is used to correct it during the test.  · You will stay in a room for at least a few hours to make sure the catheter site starts to heal. You may have a bandage or a compression device on your groin or arm to prevent bleeding. · If the catheter was placed in your groin, you may lie in bed for a few hours. If the catheter was put in your arm, you will need to keep your arm still for at least 1 hour. How long does it take? The test will take about 30 minutes. If a problem is found and the doctor treats it, the test can take a few hours longer. What happens after the test?  · You may or may not need to stay in the hospital overnight. You will get more instructions for what to do at home. · Drink plenty of fluids for several hours after the test.  Follow-up care is a key part of your treatment and safety. Be sure to make and go to all appointments, and call your doctor if you are having problems. It's also a good idea to know your test results and keep a list of the medicines you take. Where can you learn more? Go to https://Playdate App.Alter Eco. org and sign in to your apartum account. Enter W306 in the MediaPhy box to learn more about \"Left Heart Catheterization: About This Test.\"     If you do not have an account, please click on the \"Sign Up Now\" link. Current as of: December 16, 2019               Content Version: 12.6  © 7742-8849 [employer]. Care instructions adapted under license by [facility] ([facility]). If you have questions about a medical condition or this instruction, always ask your healthcare professional. [address] any warranty or liability for your use of this information. Patient Education        Learning About Coronary Angioplasty  What is a coronary angioplasty? Coronary angioplasty is the name for procedures to open a blocked coronary artery. This also may be called percutaneous coronary intervention (PCI). An angioplasty is a way to open a blocked coronary artery before, during, or after a heart attack. It gets blood flowing to your heart. And it can help prevent heart problems by widening an artery that has been narrowed by fatty buildup (plaque). How is the procedure done? Coronary angioplasty is done in a cardiac catheterization laboratory (\"cath lab\"). It is done by a heart specialist called a cardiologist. The whole procedure may take 1½ to 3 hours. You lie on a table under a large X-ray machine. You will get medicine through an IV in one of your veins. It helps you relax and not feel pain. You will be awake during the procedure. But you may not be able to remember much about it. The doctor will inject some medicine into your arm or groin to numb the skin. You will feel a small needle poke you. It's like having a blood test. You may feel some pressure when the doctor puts in the catheter. But you will not feel pain. The doctor will look at X-ray pictures on a monitor (like a TV screen) to move the catheter to your heart. The doctor then puts a dye into the catheter. This makes your heart's arteries show up on a screen. The doctor can then see any arteries that are blocked or narrowed. You may feel warm or flushed for a short time when the doctor injects dye into your artery. If you have a blocked or narrow artery, the doctor uses a catheter with a tiny balloon at the tip. The doctor puts it into the blocked or narrow area and inflates it. The balloon presses the fatty buildup against the walls of the artery. This buildup is called plaque. This creates more room for blood to flow. In most cases, the doctor then puts a stent in the artery.  A stent is a small, [employer], [facility] disclaims any warranty or liability for your use of this information. Patient Education        Angina: Care Instructions  Your Care Instructions     You have a problem called angina. Angina happens when there is not enough blood flow to your heart muscle. Angina is a sign of coronary artery disease (CAD). CAD occurs when blood vessels that supply the heart become narrowed. Having CAD increases your risk of a heart attack. Chest pain or pressure is the most common symptom of angina. But some people have other symptoms, like:  · Pain, pressure, or a strange feeling in the back, neck, jaw, or upper belly, or in one or both shoulders or arms. · Shortness of breath. · Nausea or vomiting. · Lightheadedness or sudden weakness. · Fast or irregular heartbeat. Women are somewhat more likely than men to have angina symptoms like shortness of breath, nausea, and back or jaw pain. Angina can be dangerous. That's why it is important to pay attention to your symptoms. Know what is typical for you, learn how to control your symptoms, and understand when you need to get treatment. A change in your usual pattern of symptoms is an emergency. It may mean that you are having a heart attack. The doctor has checked you carefully, but problems can develop later. If you notice any problems or new symptoms, get medical treatment right away. Follow-up care is a key part of your treatment and safety. Be sure to make and go to all appointments, and call your doctor if you are having problems. It's also a good idea to know your test results and keep a list of the medicines you take. How can you care for yourself at home? Medicines    · If your doctor has given you nitroglycerin for angina symptoms, keep it with you at all times. If you have symptoms, sit down and rest, and take the first dose of nitroglycerin as directed.  If your symptoms get worse or are not getting better within 5 minutes, call 911 right away. Stay on the phone. The emergency  will give you further instructions.     · If your doctor advises it, take 1 low-dose aspirin a day to prevent heart attack.     · Be safe with medicines. Take your medicines exactly as prescribed. Call your doctor if you think you are having a problem with your medicine. You will get more details on the specific medicines your doctor prescribes. Lifestyle changes    · Do not smoke. If you need help quitting, talk to your doctor about stop-smoking programs and medicines. These can increase your chances of quitting for good.     · Eat a heart-healthy diet that is low in saturated fat and salt, and is high in fiber. Talk to your doctor or a dietitian about healthy eating.     · Stay at a healthy weight. Or lose weight if you need to. Activity    · Talk to your doctor about a level of activity that is safe for you.     · If an activity causes angina symptoms, stop and rest.   When should you call for help? Call 911 anytime you think you may need emergency care. For example, call if:    · You passed out (lost consciousness).     · You have symptoms of a heart attack. These may include:  ? Chest pain or pressure, or a strange feeling in the chest.  ? Sweating. ? Shortness of breath. ? Nausea or vomiting. ? Pain, pressure, or a strange feeling in the back, neck, jaw, or upper belly or in one or both shoulders or arms. ? Lightheadedness or sudden weakness. ? A fast or irregular heartbeat. After you call 911, the  may tell you to chew 1 adult-strength or 2 to 4 low-dose aspirin. Wait for an ambulance. Do not try to drive yourself.     · You have angina symptoms that do not go away with rest or are not getting better within 5 minutes after you take a dose of nitroglycerin. Call your doctor now if:    · Your angina symptoms seem worse but still follow your typical pattern.  You can predict when symptoms will happen, but they may come on sooner, feel worse, or last longer.     · You feel dizzy or lightheaded, or you feel like you may faint. Watch closely for changes in your health, and be sure to contact your doctor if you have any problems. Where can you learn more? Go to https://Noblivitypealexieb.EoeMobile. org and sign in to your AdLemons account. Enter H129 in the Hydrocapsule box to learn more about \"Angina: Care Instructions. \"     If you do not have an account, please click on the \"Sign Up Now\" link. Current as of: December 16, 2019               Content Version: 12.6  © 3890-0577 [employer]. Care instructions adapted under license by [facility] ([facility]). If you have questions about a medical condition or this instruction, always ask your healthcare professional. [address] any warranty or liability for your use of this information.

## 2020-11-17 ENCOUNTER — HOSPITAL ENCOUNTER (OUTPATIENT)
Dept: CARDIAC CATH/INVASIVE PROCEDURES | Age: 80
LOS: 1 days | Discharge: HOME | End: 2020-11-18
Attending: INTERNAL MEDICINE | Admitting: INTERNAL MEDICINE
Payer: MEDICARE

## 2020-11-17 PROBLEM — I25.10 CAD IN NATIVE ARTERY: Status: ACTIVE | Noted: 2020-11-17

## 2020-11-17 LAB
EKG ATRIAL RATE: 61 BPM
EKG DIAGNOSIS: NORMAL
EKG P AXIS: 43 DEGREES
EKG P-R INTERVAL: 184 MS
EKG Q-T INTERVAL: 438 MS
EKG QRS DURATION: 88 MS
EKG QTC CALCULATION (BAZETT): 440 MS
EKG R AXIS: -23 DEGREES
EKG T AXIS: -5 DEGREES
EKG VENTRICULAR RATE: 61 BPM
POC ACT LR: 157 SEC
POC ACT LR: 176 SEC
POC ACT LR: 192 SEC
POC ACT LR: 212 SEC
POC ACT LR: 228 SEC
POC ACT LR: >400 SEC

## 2020-11-17 PROCEDURE — 36216 PLACE CATHETER IN ARTERY: CPT

## 2020-11-17 PROCEDURE — C1894 INTRO/SHEATH, NON-LASER: HCPCS

## 2020-11-17 PROCEDURE — 2580000003 HC RX 258: Performed by: INTERNAL MEDICINE

## 2020-11-17 PROCEDURE — C1887 CATHETER, GUIDING: HCPCS

## 2020-11-17 PROCEDURE — C1769 GUIDE WIRE: HCPCS

## 2020-11-17 PROCEDURE — 99152 MOD SED SAME PHYS/QHP 5/>YRS: CPT

## 2020-11-17 PROCEDURE — 93010 ELECTROCARDIOGRAM REPORT: CPT | Performed by: INTERNAL MEDICINE

## 2020-11-17 PROCEDURE — 2500000003 HC RX 250 WO HCPCS

## 2020-11-17 PROCEDURE — 2709999900 HC NON-CHARGEABLE SUPPLY

## 2020-11-17 PROCEDURE — 75710 ARTERY X-RAYS ARM/LEG: CPT

## 2020-11-17 PROCEDURE — 93458 L HRT ARTERY/VENTRICLE ANGIO: CPT | Performed by: INTERNAL MEDICINE

## 2020-11-17 PROCEDURE — C9600 PERC DRUG-EL COR STENT SING: HCPCS

## 2020-11-17 PROCEDURE — 6360000004 HC RX CONTRAST MEDICATION: Performed by: INTERNAL MEDICINE

## 2020-11-17 PROCEDURE — 6360000002 HC RX W HCPCS

## 2020-11-17 PROCEDURE — 99153 MOD SED SAME PHYS/QHP EA: CPT

## 2020-11-17 PROCEDURE — 93005 ELECTROCARDIOGRAM TRACING: CPT | Performed by: INTERNAL MEDICINE

## 2020-11-17 PROCEDURE — 6370000000 HC RX 637 (ALT 250 FOR IP)

## 2020-11-17 PROCEDURE — 93458 L HRT ARTERY/VENTRICLE ANGIO: CPT

## 2020-11-17 PROCEDURE — 99223 1ST HOSP IP/OBS HIGH 75: CPT | Performed by: INTERNAL MEDICINE

## 2020-11-17 PROCEDURE — C1874 STENT, COATED/COV W/DEL SYS: HCPCS

## 2020-11-17 PROCEDURE — 85347 COAGULATION TIME ACTIVATED: CPT

## 2020-11-17 PROCEDURE — 92928 PRQ TCAT PLMT NTRAC ST 1 LES: CPT | Performed by: INTERNAL MEDICINE

## 2020-11-17 RX ORDER — SODIUM CHLORIDE 9 MG/ML
INJECTION, SOLUTION INTRAVENOUS CONTINUOUS
Status: DISCONTINUED | OUTPATIENT
Start: 2020-11-17 | End: 2020-11-18 | Stop reason: HOSPADM

## 2020-11-17 RX ORDER — MIDAZOLAM HYDROCHLORIDE 1 MG/ML
2 INJECTION INTRAMUSCULAR; INTRAVENOUS
Status: ACTIVE | OUTPATIENT
Start: 2020-11-17 | End: 2020-11-17

## 2020-11-17 RX ORDER — 0.9 % SODIUM CHLORIDE 0.9 %
500 INTRAVENOUS SOLUTION INTRAVENOUS PRN
Status: DISCONTINUED | OUTPATIENT
Start: 2020-11-17 | End: 2020-11-18 | Stop reason: HOSPADM

## 2020-11-17 RX ORDER — SODIUM CHLORIDE 0.9 % (FLUSH) 0.9 %
10 SYRINGE (ML) INJECTION PRN
Status: DISCONTINUED | OUTPATIENT
Start: 2020-11-17 | End: 2020-11-18 | Stop reason: HOSPADM

## 2020-11-17 RX ORDER — ATROPINE SULFATE 0.4 MG/ML
0.5 AMPUL (ML) INJECTION
Status: ACTIVE | OUTPATIENT
Start: 2020-11-17 | End: 2020-11-17

## 2020-11-17 RX ORDER — ASPIRIN 81 MG/1
81 TABLET, CHEWABLE ORAL DAILY
Status: DISCONTINUED | OUTPATIENT
Start: 2020-11-18 | End: 2020-11-18 | Stop reason: HOSPADM

## 2020-11-17 RX ORDER — CLOPIDOGREL BISULFATE 75 MG/1
75 TABLET ORAL DAILY
Status: DISCONTINUED | OUTPATIENT
Start: 2020-11-18 | End: 2020-11-18 | Stop reason: HOSPADM

## 2020-11-17 RX ORDER — FENTANYL CITRATE 50 UG/ML
25 INJECTION, SOLUTION INTRAMUSCULAR; INTRAVENOUS
Status: ACTIVE | OUTPATIENT
Start: 2020-11-17 | End: 2020-11-17

## 2020-11-17 RX ORDER — SODIUM CHLORIDE 0.9 % (FLUSH) 0.9 %
10 SYRINGE (ML) INJECTION EVERY 12 HOURS SCHEDULED
Status: DISCONTINUED | OUTPATIENT
Start: 2020-11-17 | End: 2020-11-18 | Stop reason: HOSPADM

## 2020-11-17 RX ORDER — MORPHINE SULFATE 2 MG/ML
2 INJECTION, SOLUTION INTRAMUSCULAR; INTRAVENOUS
Status: ACTIVE | OUTPATIENT
Start: 2020-11-17 | End: 2020-11-17

## 2020-11-17 RX ORDER — ATORVASTATIN CALCIUM 40 MG/1
40 TABLET, FILM COATED ORAL NIGHTLY
Status: DISCONTINUED | OUTPATIENT
Start: 2020-11-17 | End: 2020-11-18 | Stop reason: HOSPADM

## 2020-11-17 RX ORDER — ONDANSETRON 2 MG/ML
4 INJECTION INTRAMUSCULAR; INTRAVENOUS EVERY 6 HOURS PRN
Status: DISCONTINUED | OUTPATIENT
Start: 2020-11-17 | End: 2020-11-18 | Stop reason: HOSPADM

## 2020-11-17 RX ORDER — ASPIRIN 325 MG
325 TABLET ORAL ONCE
Status: DISCONTINUED | OUTPATIENT
Start: 2020-11-17 | End: 2020-11-18 | Stop reason: HOSPADM

## 2020-11-17 RX ORDER — ACETAMINOPHEN 325 MG/1
650 TABLET ORAL EVERY 4 HOURS PRN
Status: DISCONTINUED | OUTPATIENT
Start: 2020-11-17 | End: 2020-11-18 | Stop reason: HOSPADM

## 2020-11-17 RX ADMIN — Medication 10 ML: at 21:00

## 2020-11-17 RX ADMIN — Medication 10 ML: at 20:07

## 2020-11-17 RX ADMIN — IOPAMIDOL 280 ML: 755 INJECTION, SOLUTION INTRAVENOUS at 11:17

## 2020-11-17 ASSESSMENT — PAIN SCALES - GENERAL
PAINLEVEL_OUTOF10: 0
PAINLEVEL_OUTOF10: 0

## 2020-11-17 NOTE — PRE SEDATION
Brief Pre-Op Note/Sedation Assessment      Francis Shirley  1940  Room/bed info not found      [id number]  9:47 AM    Planned Procedure: Cardiac Catheterization Procedure    Post Procedure Plan: Return to same level of care    Consent: I have discussed with the patient and/or the patient representative the indication, alternatives, and the possible risks and/or complications of the planned procedure and the anesthesia methods. The patient and/or patient representative appear to understand and agree to proceed. Chief Complaint: Chest Pain/Pressure      Indications for Cath Procedure:  New Onset Angina <= 2 months  Anginal Classification within 2 weeks:  CCS I - Angina only during strenuous or prolonged physical activity  NYHA Heart Failure Class within 2 weeks: No symptoms  Is Cath Lab Visit Valve-related?: No  Surgical Risk: Low  Functional Type: N/A      Stress or Imaging Studies Performed (within 6 months):  None     Vital Signs:  BP (!) 166/84   Pulse 79   Temp 98 °F (36.7 °C) (Oral)   Resp 16   Ht 5' 10\" (1.778 m)   Wt 299 lb (135.6 kg)   SpO2 97%   BMI 42.90 kg/m²     Allergies:   Allergies   Allergen Reactions    Sulfa Antibiotics Diarrhea and Other (See Comments)     Lower extremity weakness       Past Medical History:  Past Medical History:   Diagnosis Date    Anesthesia     hallucinations for prostate surgery     Arthritis     Atrial fibrillation ([address]     CAD (coronary artery disease)     Cancer ([address]     Kidney    DVT (deep venous thrombosis) ([facility])     CORNELIUS legs post prostate surgery 5 yrs ago    Hx of blood clots     Hypertension          Surgical History:  Past Surgical History:   Procedure Laterality Date    BACK SURGERY      3 surgeries total.  Lumbar 3&4,    CARDIAC CATHETERIZATION  2011    CARDIAC SURGERY      stents placed- 1 in 2000 and 2 in 2002.  3 surgeries total    CATARACT REMOVAL Bilateral     CHOLECYSTECTOMY      COLONOSCOPY      HERNIA REPAIR Left X2.  Left flank and right inguinal    JOINT REPLACEMENT  2009    right knee replacement    KIDNEY SURGERY      tumor removed     NASAL SEPTUM SURGERY      PRESSURE ULCER DEBRIDEMENT      had a stage 4 wound on coccyx 6 years ago    PROSTATE SURGERY      2012-prostate removal due to enlargement (not cancerous)    TUMOR REMOVAL Left     kidney    VENA CAVA FILTER PLACEMENT      5 yrs ago         Medications:  Current Outpatient Medications   Medication Sig Dispense Refill    atenolol (TENORMIN) 50 MG tablet TAKE 1 TABLET BY MOUTH TWICE A  tablet 3    valsartan-hydroCHLOROthiazide (DIOVAN-HCT) 160-25 MG per tablet Take 1 tablet by mouth daily In AM 90 tablet 5    clopidogrel (PLAVIX) 75 MG tablet TAKE 1 TABLET BY MOUTH EVERY DAY 90 tablet 3    Magnesium Gluconate 27.5 MG TABS Take 500 mg by mouth      famotidine (PEPCID) 20 MG tablet Take 20 mg by mouth 2 times daily      b complex vitamins capsule Take 1 capsule by mouth daily      aspirin 81 MG tablet Take 325 mg by mouth daily       Cholecalciferol (VITAMIN D3) 2000 UNITS CAPS Take 1 capsule by mouth daily       calcium carbonate (OSCAL) 500 MG TABS tablet Take 500 mg by mouth daily      nitroGLYCERIN (NITROSTAT) 0.4 MG SL tablet Place 1 tablet under the tongue every 5 minutes as needed for Chest pain 25 tablet 3     Current Facility-Administered Medications   Medication Dose Route Frequency Provider Last Rate Last Dose    0.9 % sodium chloride infusion   Intravenous Continuous Jarvis Bartlett MD        sodium chloride flush 0.9 % injection 10 mL  10 mL Intravenous 2 times per day Jarvis Bartlett MD        sodium chloride flush 0.9 % injection 10 mL  10 mL Intravenous PRN Jarvis Bartlett MD        aspirin tablet 325 mg  325 mg Oral Once Jarvis Bartlett MD               Pre-Sedation:    Pre-Sedation Documentation and Exam:  I have personally completed a history, physical exam & review of systems for this patient (see notes).     Prior History of Anesthesia Complications:   none    Modified Mallampati:  I (soft palate, uvula, fauces, tonsillar pillars visible)    ASA Classification:  Has hx of CAD with multiple stents; hyperlipidemia, DVT, thrombosed Vena cava filter. Ravinder Scale: Activity:  2 - Able to move 4 extremities voluntarily on command  Respiration:  2 - Able to breathe deeply and cough freely  Circulation:  2 - BP+/- 20mmHg of normal  Consciousness:  2 - Fully awake  Oxygen Saturation (color):  2 - Able to maintain oxygen saturation >92% on room air    Sedation/Anesthesia Plan:  Guard the patient's safety and welfare. Minimize physical discomfort and pain. Minimize negative psychological responses to treatment by providing sedation and analgesia and maximize the potential amnesia. Patient to meet pre-procedure discharge plan.     Medication Planned:  fentanyl intravenously and morphine intravenously    Patient is an appropriate candidate for plan of sedation: yes      Electronically signed by Mara Lamar MD on 11/17/2020 at 9:47 AM

## 2020-11-17 NOTE — H&P
[address]  Admission H & P    CC: Chest pain               Rubi Snow MD:           HPI:   This is a 79 y.o. male who has history of coronary arteries and innumerable stents came to my office yesterday and said that a few weeks ago when exposed to cold weather he had significant pressure and tightness in the chest.    He is status post multiple stents the last 1 was a year and a half ago which required a stent to the proximal LAD and proximal diagonal.      Past Medical History:   Diagnosis Date    Anesthesia     hallucinations for prostate surgery     Arthritis     Atrial fibrillation ([address]     CAD (coronary artery disease)     Cancer ([address]     Kidney    DVT (deep venous thrombosis) ([facility])     CORNELIUS legs post prostate surgery 5 yrs ago    Hx of blood clots     Hypertension         Past Surgical History:   Procedure Laterality Date    BACK SURGERY      3 surgeries total.  Lumbar 3&4,    CARDIAC CATHETERIZATION      CARDIAC SURGERY      stents placed- 1 in  and 2 in .  3 surgeries total    CATARACT REMOVAL Bilateral     CHOLECYSTECTOMY      COLONOSCOPY      HERNIA REPAIR Left      X2.   Left flank and right inguinal    JOINT REPLACEMENT  2009    right knee replacement    KIDNEY SURGERY      tumor removed     NASAL SEPTUM SURGERY      PRESSURE ULCER DEBRIDEMENT      had a stage 4 wound on coccyx 6 years ago   [place] PROSTATE SURGERY      2012-prostate removal due to enlargement (not cancerous)    TUMOR REMOVAL Left     kidney    VENA CAVA FILTER PLACEMENT      5 yrs ago         Family History   Problem Relation Age of Onset    Other Mother     Cancer Mother         ovarian    Other Father         Social History     Tobacco Use    Smoking status: Former Smoker     Packs/day: 0.50     Years: 6.00     Pack years: 3.00     Types: Cigarettes     Last attempt to quit: 1966     Years since quittin.3    Smokeless tobacco: Never Used   Substance Use Topics    Alcohol use: No    Drug use: No        Allergies   Allergen Reactions    Sulfa Antibiotics Diarrhea and Other (See Comments)     Lower extremity weakness         sodium chloride flush  10 mL Intravenous 2 times per day    aspirin  325 mg Oral Once        Review of Systems -   Constitutional: Negative for weight gain/loss; malaise, fever  Respiratory: Negative for Asthma;  cough and hemoptysis  Cardiovascular: Negative for palpitations,dizziness   Gastrointestinal: Negative for abd.pain; constipation/diarrhea;    Genitourinary: Negative for stones; hematuria; frequency hesitancy  Integumentt: Negative for rash or pruritis  Hematologic/lymphatic: Negative for blood dyscrasia; leukemia/lymphoma  Musculoskeletal: Negative for Connective tissue disease  Neurological:  Negative for Seizure   Behavioral/Psych:Negative for Bipolar disorder, Schizophrenia; Dementia  Endocrine: negative for thyroid, parathyroid disease    No intake or output data in the 24 hours ending 11/17/20 0938     Physical Examination:    BP (!) 166/84   Pulse 79   Temp 98 °F (36.7 °C) (Oral)   Resp 16   Ht 5' 10\" (1.778 m)   Wt 299 lb (135.6 kg)   SpO2 97%   BMI 42.90 kg/m²    Vitals:    11/17/20 0901   BP: (!) 166/84   Pulse: 79   Resp: 16   Temp: 98 °F (36.7 °C)   TempSrc: Oral   SpO2: 97%   Weight: 299 lb (135.6 kg)   Height: 5' 10\" (1.778 m)     Wt Readings from Last 3 Encounters:   11/17/20 299 lb (135.6 kg)   11/16/20 296 lb 3.2 oz (134.4 kg)   08/17/20 294 lb (133.4 kg)      BP Readings from Last 3 Encounters:   11/17/20 (!) 166/84   11/16/20 (!) 147/70   08/17/20 138/78         HEENT:  Face: Atraumatic, Conjunctiva: Pink; non icteric,  Mucous Memb:  Moist, No thyromegaly or Lymphadenopathy  Respiratory:  Resp Assessment: normal, Resp Auscultation: clear   Cardiovascular: Auscultation: nl S1 & S2, Palpation:  Nl PMI;  No heaves or thrills, JVP:  normal  Abdomen: Soft, non-tender, Normal bowel sounds,  No organomegaly  Extremities: No Cyanosis or Clubbing  Neurological: Oriented to time, place, and person, Non-anxious  Psychiatric: Normal mood and affect  Skin: Warm and dry,  No rash seen    Current Facility-Administered Medications: 0.9 % sodium chloride infusion, , Intravenous, Continuous  sodium chloride flush 0.9 % injection 10 mL, 10 mL, Intravenous, 2 times per day  sodium chloride flush 0.9 % injection 10 mL, 10 mL, Intravenous, PRN  aspirin tablet 325 mg, 325 mg, Oral, Once       Labs:   Recent Labs     11/16/20  1602   WBC 9.7   HGB 15.8   HCT 48.2        Recent Labs     11/16/20  1602      K 3.9   CO2 34*   BUN 21*   CREATININE 1.2   LABRCNT(BNP:2)@  No results for input(s): PROTIME, INR in the last 72 hours. No results for input(s): APTT in the last 72 hours. No results for input(s): CKTOTAL, CKMB, CKMBINDEX, TROPONINI in the last 72 hours. Lab Results   Component Value Date    HDL 31 11/16/2020    HDL 34 07/20/2011    LDLDIRECT 126 11/16/2020    [employer] see below 11/16/2020    TRIG 329 11/16/2020         ECHO:1/2019    Normal left ventricle size and systolic function with an estimated ejection   fraction of 55-60%. No regional wall motion abnormalities are seen. Mild concentric left ventricular hypertrophy. Normal function of all valves. Stress Nuclear:      CORONARY ANGIOGRAM: 1/2019  1. There is single vessel disease in this right dominant circulation. 2.  Left main:  Free of disease. 3.  LAD in the proximal segment has 80% focal eccentric stenosis. The  first diagonal upper branch has a 95% stenosis. The rest of the vessel  has no major obstructive disease. 4.  Left circumflex artery. The previously stented site is widely  patent. The OM1 is patent as well. 5.  The right coronary artery also is a dominant vessel. The previously  placed stents are widely patent. There is mild luminal irregularities  in the distal RCA. 1.  Successful stenting of the proximal LAD lesion reduced from 80% to  0%.   A 3.0 x 15 ISAIAS stent used. 2.  Successful stenting of the first diagonal.  A 2.25 x 12 ISAIAS stent  used to a final diameter of 2.5. ASSESSMENT AND PLAN:        36-year-old patient with chest pain on exposure to cold weather  Has multiple stents    Is here for diagnostic angiography and possible PCI  Procedure and risk explained to the patient who understands and wishes to proceed           Christos RODRIGUEZ  11/17/2020

## 2020-11-17 NOTE — PROGRESS NOTES
1820: Pt admitted to [facility] 177 room 1310 from cath lab with pre/post RN at bedside. Pt assisted to room bed. Groin checks complete. Remain clean dry and intact. Remains soft and no signs of hematoma. Right radial site remains unremarkable as well. Pulses palpable in all extremities. Denies chest pain. Was having pain after procedure but has subsided.

## 2020-11-17 NOTE — PROCEDURES
[address] [place] [address]                            CARDIAC CATHETERIZATION    PATIENT NAME: Thang Garcia                :        1940  MED REC NO:   [id number]                          ROOM:  ACCOUNT NO:   [de-identified]                           ADMIT DATE: 2020  PROVIDER:     Ghazala Kimball MD    DATE OF PROCEDURE:  2020    BRIEF HISTORY:  The patient came to the office yesterday and said that  he had pressure and tightness in his chest when he was out in the cold  getting his garbage out. He has history of multiple stents in the past.    PROCEDURE PERFORMED:  1. Left heart catheterization, coronary angiogram, LV angiogram  performed through the right femoral artery. 2.  PCI and stenting of the ostial LAD and distal LAD. 3.  Angiogram of the right subclavian and brachial artery. DIAGNOSTIC ANGIOGRAM CATHETERS USED:  Sargeant Hardy, and a pigtail catheter. HEMODYNAMICS:  Aortic pressure was 140/80. Left ventricular pressure  was 140/10 mmHg. There was no gradient across the aortic valve. LV ANGIOGRAM:  Performed in the NGUYEN 30 degrees projections showed normal  left ventricular size and systolic function. Ejection fraction is 55%  to 60%. CORONARY ANGIOGRAPHY FINDINGS:  1. Left main:  Free of disease. 2.  LAD:  Ostium has an 80% lesion. Beyond that, there are two stents  in the proximal and mid LAD which are widely patent. The extreme distal  LAD at the apex has two tandem lesions in the 80% range. 3.  The first diagonal that was stented may have some in-stent stenosis. 4.  The left circumflex has no major obstructive disease. 5.  The ramus has no major obstructive disease. 6.  The right coronary artery has no major obstructive disease. 7.  All the stents that are placed are widely patent. CONCLUSION:  1.   High-grade ostial LAD narrowing in the 80% range; distal LAD has the  two tandem lesions in the 80% range. 2.  Normal left ventricular size and systolic function. 3.  Normal hemodynamics. Brachial arteriogram was performed and shows widely patent brachial  artery. INTERVENTIONAL PROCEDURE CATHETERS USED:  EBU3.5 guide, Runthrough wire,  a 2.75 x 15 for the distal LAD and a 3.5 x 12 for the ostial LAD. TECHNICAL COMMENT:  The guide engaged the ostium well and provided good  support. The lesion was crossed without difficulty. Direct stenting  was used with single inflation up to 12 atmospheres. This corresponds  to a vessel size of 2.75. Followup angiography showed 0% residual.   Next, we did direct stenting of the ostial LAD. A 3.5 x 12 ISAIAS Ji  stent was deployed up to 16 atmospheres which corresponded to a vessel  size of approximately 3.7. The patient had significant chest pain but  no hyperinflations. However, there were no ischemic ST changes. CONCLUSION:  Successful PCI and stenting of the ostial LAD. A 3.5 x 12  ISAIAS stent was placed. Successful stenting of the distal LAD. A 2.75 x  15 ISAIAS stent was used.     EBL: < 20 cc    CHAIM LYNCH MD    D: 11/17/2020 12:52:54       T: 11/17/2020 14:38:06     /[email or web address]  Job#: [id number]     Doc#: [id number]    CC:

## 2020-11-18 VITALS
DIASTOLIC BLOOD PRESSURE: 56 MMHG | WEIGHT: 293.21 LBS | TEMPERATURE: 97.9 F | SYSTOLIC BLOOD PRESSURE: 135 MMHG | OXYGEN SATURATION: 98 % | HEIGHT: 70 IN | HEART RATE: 67 BPM | RESPIRATION RATE: 24 BRPM | BODY MASS INDEX: 41.98 KG/M2

## 2020-11-18 LAB
EKG ATRIAL RATE: 63 BPM
EKG DIAGNOSIS: NORMAL
EKG P AXIS: 44 DEGREES
EKG P-R INTERVAL: 178 MS
EKG Q-T INTERVAL: 442 MS
EKG QRS DURATION: 106 MS
EKG QTC CALCULATION (BAZETT): 452 MS
EKG R AXIS: -25 DEGREES
EKG T AXIS: 12 DEGREES
EKG VENTRICULAR RATE: 63 BPM
HCT VFR BLD CALC: 43.7 % (ref 40.5–52.5)
HEMOGLOBIN: 14.6 G/DL (ref 13.5–17.5)
MCH RBC QN AUTO: 30 PG (ref 26–34)
MCHC RBC AUTO-ENTMCNC: 33.3 G/DL (ref 31–36)
MCV RBC AUTO: 90.2 FL (ref 80–100)
PDW BLD-RTO: 15.6 % (ref 12.4–15.4)
PLATELET # BLD: 123 K/UL (ref 135–450)
PMV BLD AUTO: 7.2 FL (ref 5–10.5)
RBC # BLD: 4.85 M/UL (ref 4.2–5.9)
WBC # BLD: 7.5 K/UL (ref 4–11)

## 2020-11-18 PROCEDURE — 85027 COMPLETE CBC AUTOMATED: CPT

## 2020-11-18 PROCEDURE — 6370000000 HC RX 637 (ALT 250 FOR IP): Performed by: INTERNAL MEDICINE

## 2020-11-18 PROCEDURE — 93010 ELECTROCARDIOGRAM REPORT: CPT | Performed by: INTERNAL MEDICINE

## 2020-11-18 PROCEDURE — 93005 ELECTROCARDIOGRAM TRACING: CPT | Performed by: INTERNAL MEDICINE

## 2020-11-18 RX ORDER — ATORVASTATIN CALCIUM 40 MG/1
40 TABLET, FILM COATED ORAL NIGHTLY
Qty: 30 TABLET | Refills: 3 | Status: SHIPPED | OUTPATIENT
Start: 2020-11-18 | End: 2020-12-11

## 2020-11-18 RX ORDER — ASPIRIN 81 MG/1
81 TABLET, CHEWABLE ORAL DAILY
Qty: 30 TABLET | Refills: 3 | Status: SHIPPED | OUTPATIENT
Start: 2020-11-19

## 2020-11-18 RX ORDER — EVOLOCUMAB 140 MG/ML
1 INJECTION, SOLUTION SUBCUTANEOUS SEE ADMIN INSTRUCTIONS
Qty: 1 PEN | Refills: 5 | Status: SHIPPED | OUTPATIENT
Start: 2020-11-18 | End: 2020-11-19 | Stop reason: SDUPTHER

## 2020-11-18 RX ADMIN — CLOPIDOGREL BISULFATE 75 MG: 75 TABLET ORAL at 08:47

## 2020-11-18 RX ADMIN — ASPIRIN 81 MG: 81 TABLET, CHEWABLE ORAL at 08:47

## 2020-11-18 ASSESSMENT — PAIN SCALES - GENERAL: PAINLEVEL_OUTOF10: 0

## 2020-11-18 NOTE — PROGRESS NOTES
0700: Handoff complete. 0900: shift assessment complete and noted in chart. Denies pain, groin sites clean dry and intact. No hematoma present. Removed oxygen. Pt does have sleep apnea and was attempting to obtain a cpap but covid happened and has made it difficult for him. 0915:Dr. Chisholm at bedside. Dr. Cata Panchal aware of oxygen, did educate the pt of importance of completing the study. Dr. Cata Panchal explained blockages. Going to discharge pt. Writing for prescription for cholesterol injections to see if he will qualify for medication. 1100: Pt assisted getting dressed. PIV removed. Discharge instructions reviewed.      1300: pt wheeled to private car

## 2020-11-18 NOTE — PLAN OF CARE
Pt remains free of chest pain, comfortable. Groin site, radial site clean, dry, intact without signs of bleeding or hematoma. Pt able to maintain O2 saturation above 90% while awake. Pt states previous diagnosis of MILY with possible recommendation for O2 at night. Pt desaturated with short apneic periods at night, not longer than 10 seconds with saturations on RA never below 80%. Pt maintained above 90% with 2LNC. Instructed patient to follow up with PCP, cardiologist and healthcare team about preventing apnea/desaturation at night.     Problem: Discharge Planning:  Goal: Discharged to appropriate level of care  Description: Discharged to appropriate level of care  Outcome: Ongoing     Problem: Pain - Acute:  Goal: Pain level will decrease  Description: Pain level will decrease  Outcome: Ongoing     Problem: Fluid Volume - Imbalance:  Goal: Will show no signs and symptoms of excessive bleeding  Description: Will show no signs and symptoms of excessive bleeding  Outcome: Ongoing  Goal: Absence of imbalanced fluid volume signs and symptoms  Description: Absence of imbalanced fluid volume signs and symptoms  Outcome: Ongoing     Problem: Anxiety:  Goal: Level of anxiety will decrease  Description: Level of anxiety will decrease  Outcome: Ongoing     Problem: Cardiac Output - Decreased:  Goal: Cardiac output within specified parameters  Description: Cardiac output within specified parameters  Outcome: Ongoing     Problem: Tissue Perfusion - Cardiopulmonary, Altered:  Goal: Circulatory function within specified parameters  Description: Circulatory function within specified parameters  Outcome: Ongoing  Goal: Absence of angina  Description: Absence of angina  Outcome: Ongoing  Goal: Hemodynamic stability will improve  Description: Hemodynamic stability will improve  Outcome: Ongoing     Problem: Tissue Perfusion - Peripheral, Altered:  Goal: Absence of hematoma at arterial access site  Description: Absence of hematoma at arterial access site  Outcome: Ongoing  Goal: Circulatory function of lower extremities is within specified parameters  Description: Circulatory function of lower extremities is within specified parameters  Outcome: Ongoing     Problem: Tissue Perfusion - Renal, Altered:  Goal: Electrolytes within specified parameters  Description: Electrolytes within specified parameters  Outcome: Ongoing  Goal: Urine creatinine clearance will be within specified parameters  Description: Urine creatinine clearance will be within specified parameters  Outcome: Ongoing  Goal: Serum creatinine will be within specified parameters  Description: Serum creatinine will be within specified parameters  Outcome: Ongoing  Goal: Ability to achieve a balanced intake and output will improve  Description: Ability to achieve a balanced intake and output will improve  Outcome: Ongoing

## 2020-11-19 RX ORDER — EVOLOCUMAB 140 MG/ML
1 INJECTION, SOLUTION SUBCUTANEOUS SEE ADMIN INSTRUCTIONS
Qty: 1 PEN | Refills: 5 | Status: SHIPPED | OUTPATIENT
Start: 2020-11-19 | End: 2020-11-25 | Stop reason: SDUPTHER

## 2020-11-20 ENCOUNTER — TELEPHONE (OUTPATIENT)
Dept: CARDIOLOGY CLINIC | Age: 80
End: 2020-11-20

## 2020-11-20 NOTE — TELEPHONE ENCOUNTER
Pt just had 2 stents put in by Dr lane, his question is he was told by his arthritis Dr to purchase Voltaren for his left knee arthritis pain. It has a low dose of nsaids init but it does have a heart warning label. Wants to know if he can use this pain gel.     Francis # [phone]

## 2020-11-25 RX ORDER — EVOLOCUMAB 140 MG/ML
1 INJECTION, SOLUTION SUBCUTANEOUS SEE ADMIN INSTRUCTIONS
Qty: 1 PEN | Refills: 5 | Status: SHIPPED | OUTPATIENT
Start: 2020-11-25 | End: 2020-12-04

## 2020-12-04 ENCOUNTER — TELEPHONE (OUTPATIENT)
Dept: CARDIOLOGY CLINIC | Age: 80
End: 2020-12-04

## 2020-12-04 RX ORDER — ALIROCUMAB 75 MG/ML
75 INJECTION, SOLUTION SUBCUTANEOUS
COMMUNITY
End: 2020-12-04 | Stop reason: SDUPTHER

## 2020-12-04 NOTE — TELEPHONE ENCOUNTER
Pt called and said he needs his PRALUENT ALIROCUMAB 70ml 2 pens per box filled, however I did not see this on his medication list. He needs it sent to the [facility] in [place].     [address] [facility] #[phone]

## 2020-12-07 RX ORDER — ALIROCUMAB 75 MG/ML
75 INJECTION, SOLUTION SUBCUTANEOUS
Qty: 1.96 ML | Refills: 3 | Status: SHIPPED | OUTPATIENT
Start: 2020-12-07 | End: 2021-06-11

## 2020-12-11 ENCOUNTER — OFFICE VISIT (OUTPATIENT)
Dept: CARDIOLOGY CLINIC | Age: 80
End: 2020-12-11
Payer: MEDICARE

## 2020-12-11 VITALS
WEIGHT: 296.4 LBS | BODY MASS INDEX: 42.43 KG/M2 | SYSTOLIC BLOOD PRESSURE: 128 MMHG | TEMPERATURE: 97.7 F | HEART RATE: 62 BPM | OXYGEN SATURATION: 96 % | DIASTOLIC BLOOD PRESSURE: 76 MMHG | HEIGHT: 70 IN

## 2020-12-11 PROCEDURE — 1123F ACP DISCUSS/DSCN MKR DOCD: CPT | Performed by: INTERNAL MEDICINE

## 2020-12-11 PROCEDURE — 93000 ELECTROCARDIOGRAM COMPLETE: CPT | Performed by: INTERNAL MEDICINE

## 2020-12-11 PROCEDURE — G8417 CALC BMI ABV UP PARAM F/U: HCPCS | Performed by: INTERNAL MEDICINE

## 2020-12-11 PROCEDURE — 99214 OFFICE O/P EST MOD 30 MIN: CPT | Performed by: INTERNAL MEDICINE

## 2020-12-11 PROCEDURE — 4040F PNEUMOC VAC/ADMIN/RCVD: CPT | Performed by: INTERNAL MEDICINE

## 2020-12-11 PROCEDURE — 1036F TOBACCO NON-USER: CPT | Performed by: INTERNAL MEDICINE

## 2020-12-11 PROCEDURE — G8427 DOCREV CUR MEDS BY ELIG CLIN: HCPCS | Performed by: INTERNAL MEDICINE

## 2020-12-11 PROCEDURE — 1111F DSCHRG MED/CURRENT MED MERGE: CPT | Performed by: INTERNAL MEDICINE

## 2020-12-11 PROCEDURE — G8484 FLU IMMUNIZE NO ADMIN: HCPCS | Performed by: INTERNAL MEDICINE

## 2020-12-11 RX ORDER — ROSUVASTATIN CALCIUM 5 MG/1
5 TABLET, COATED ORAL
Qty: 30 TABLET | Refills: 1 | Status: SHIPPED | OUTPATIENT
Start: 2020-12-14 | End: 2021-05-10

## 2020-12-11 NOTE — PATIENT INSTRUCTIONS
take medicines. · Your doctor may suggest a procedure to open narrowed or blocked arteries. This is called angioplasty. Or your doctor may suggest using healthy blood vessels to create detours around narrowed or blocked arteries. This is called bypass surgery. Follow-up care is a key part of your treatment and safety. Be sure to make and go to all appointments, and call your doctor if you are having problems. It's also a good idea to know your test results and keep a list of the medicines you take. Where can you learn more? Go to [email or web address] org and sign in to your [email or web address] account. Enter [phone] in the Potential box to learn more about \"Learning About Coronary Artery Disease (CAD). \"     If you do not have an account, please click on the \"Sign Up Now\" link. Current as of: December 16, 2019               Content Version: 12.6  © 5476-0293 [employer]. Care instructions adapted under license by [facility] ([facility]). If you have questions about a medical condition or this instruction, always ask your healthcare professional. [place] [address] any warranty or liability for your use of this information. 1. Try Rosuvastatin 5 mg twice weekly. 2. Begin Praluent. 3. Activate co-pay card for Nexlizet, then call the office so we can send prescription to your pharmacy. 4. Repeat fasting lipid panel in 3 months.

## 2020-12-11 NOTE — PROGRESS NOTES
[address] - Cardiology      Chief Complaint: \" I stopped taking Atorvastatin. \"     History of present illness:   Loreta Desai is a 79 y.o. male with past medical history significant for CAD s/p multiple stents, atrial fibrillation, hypertension, DVT and hyperlipidemia. Diagnosed with severe MILY (8/2022) managed by pulmonology at [facility]. Left kidney CA managed by Dr. Fadi Escamilla. He continued to complain of chest pain and most recent [address] [place] [address] 11/17/20 showed patent previously placed stents and LAD lesions in the 80%. Ostial and distal LAD were both stented. Patient returns for follow up. He is accompanied by his wife today. Says he is paying >$200 for Praluent. Had chest discomfort for 1 wk after most recent LHC. Took Atorvastatin 40 mg for 2 wks post LHC and unable to tolerate. Taking all medication as prescribed. Today, he  denies any chest pain, pressure, tightness, nausea, vomiting, diaphoresis, SOB/FINLEY, palpitations, heart racing, dizziness/lightheadedness, orthopnea, PND, LE edema or syncope. Past Medical History:   Diagnosis Date    Anesthesia     hallucinations for prostate surgery     Arthritis     Atrial fibrillation ([address]     CAD (coronary artery disease)     Cancer (HCC)     Kidney    DVT (deep venous thrombosis) (HCC)     CORNELIUS legs post prostate surgery 5 yrs ago    Gout     Hx of blood clots     Hypertension      Past Surgical History:   Procedure Laterality Date    BACK SURGERY      3 surgeries total.  Lumbar 3&4,    CARDIAC CATHETERIZATION  2011    CARDIAC SURGERY      stents placed- 1 in 2000 and 2 in 2002.  3 surgeries total    CATARACT REMOVAL Bilateral     CHOLECYSTECTOMY      COLONOSCOPY      HERNIA REPAIR Left      X2.   Left flank and right inguinal    JOINT REPLACEMENT  2009    right knee replacement    KIDNEY SURGERY      tumor removed     NASAL SEPTUM SURGERY      PRESSURE ULCER DEBRIDEMENT      had a stage 4 wound on coccyx 6 years ago    PROSTATE SURGERY      2012-prostate removal due to enlargement (not cancerous)    TUMOR REMOVAL Left     kidney    VENA CAVA FILTER PLACEMENT      5 yrs ago     Social History     Tobacco Use    Smoking status: Former Smoker     Packs/day: 0.50     Years: 6.00     Pack years: 3.00     Types: Cigarettes     Last attempt to quit: 1966     Years since quittin.4    Smokeless tobacco: Never Used   Substance Use Topics    Alcohol use: No       Review of Systems:   Constitutional: No fatigue, weakness or significant change in weight. HEENT: No change in vision or ringing in the ears. Respiratory: No FINLEY, PND, orthopnea or cough. Cardiovascular: See HPI    GI: No n/v, abdominal pain or changes in bowel habits. No melena, no hematochezia  : No dysuria or hematuria. Skin: No rash or new skin lesions. Musculoskeletal: No new muscle or joint pain. Neurological: No syncope or TIA-like symptoms. Psychiatric: No anxiety, insomnia or depression    Physical Exam:  Vitals:    20 1353   BP: 128/76   Pulse: 62   Temp: 97.7 °F (36.5 °C)   SpO2: 96%   Weight: 296 lb 6.4 oz (134.4 kg)   Height: 5' 10\" (1.778 m)       General:  Awake, alert, oriented in NAD. Currently pain free  Skin:  Warm and dry. No unusual bruising or rash  Neck:  Supple. No JVD or carotid bruit appreciated  Chest:  Normal effort. Clear to auscultation, no wheezes/rhonchi/rales  Cardiovascular:  RRR, S1/S2, no murmur/gallop/rub.  Chest wall soreness reproducible on palpation and movement  Abdomen:  Soft, nontender, +bowel sounds  Extremities:  No edema  Neurological: No focal deficits  Psychological: Normal mood and affect      Current Outpatient Medications   Medication Sig Dispense Refill    alirocumab (PRALUENT) 75 MG/ML SOAJ injection pen Inject 1 mL into the skin every 14 days 1.96 mL 3    aspirin 81 MG chewable tablet Take 1 tablet by mouth daily 30 tablet 3    atenolol (TENORMIN) 50 MG tablet TAKE 1 TABLET BY MOUTH TWICE A  tablet 3    valsartan-hydroCHLOROthiazide (DIOVAN-HCT) 160-25 MG per tablet Take 1 tablet by mouth daily In AM 90 tablet 5    clopidogrel (PLAVIX) 75 MG tablet TAKE 1 TABLET BY MOUTH EVERY DAY 90 tablet 3    Magnesium Gluconate 27.5 MG TABS Take 500 mg by mouth      b complex vitamins capsule Take 1 capsule by mouth daily      Cholecalciferol (VITAMIN D3) 2000 UNITS CAPS Take 1 capsule by mouth daily       calcium carbonate (OSCAL) 500 MG TABS tablet Take 500 mg by mouth daily      nitroGLYCERIN (NITROSTAT) 0.4 MG SL tablet Place 1 tablet under the tongue every 5 minutes as needed for Chest pain 25 tablet 3     No current facility-administered medications for this visit. Labs:   Lab Results   Component Value Date    WBC 7.5 11/18/2020    HGB 14.6 11/18/2020    HCT 43.7 11/18/2020    MCV 90.2 11/18/2020     (L) 11/18/2020     Lab Results   Component Value Date     11/16/2020    K 3.9 11/16/2020    K 3.8 01/29/2019    CL 97 11/16/2020    CO2 34 11/16/2020    BUN 21 11/16/2020    CREATININE 1.2 11/16/2020    GLUCOSE 105 11/16/2020    CALCIUM 10.9 11/16/2020      Lab Results   Component Value Date    TRIG 329 11/16/2020    HDL 31 11/16/2020    HDL 34 07/20/2011    LDLCALC see below 11/16/2020    LDLDIRECT 126 11/16/2020    LABVLDL see below 11/16/2020       EKG    12/11/20  NSR, ~68 bpm.           CATH 12/9/16  1. Two-vessel coronary disease with two tandem lesions in the 80-90% range involving the proximal circumflex. 2. An 80% eccentric mid RCA lesion. 3. Normal left ventricular size and systolic function. 4. Normal hemodynamics.     Successful stenting of the proximal circumflex. Two tandem lesions were reduced to 0% using a 3.0-mm x 34-mm Synergy stent. The mid right coronary artery was stented using a 4.0-mm x 16-mm Synergy stent lesion reduced from 80% to 0%. ECHO 12/9/2016   Normal LV size and systolic function: EF is  89%.  Grade I diastolic  dysfunction.  Franca Favorite mitral regurgitation is present.   The left atrium is normal in size.   Normal right ventricular size and function.   There is trace to mild tricuspid regurgitation with RVSP estimated at 40  mmHg. which is mildly elevated.   Trivial pulmonic regurgitation present.     Coronary Angiogram and PCI: 12/2016  1. Two-vessel coronary disease with two tandem lesions in the 80-90% range involving the proximal circumflex. 2. An 80% eccentric mid RCA lesion. 3. Normal left ventricular size and systolic function. 4. Normal hemodynamics  Successful stenting of the proximal circumflex. Two tandem lesions were reduced to 0% using a 3.0-mm x 34-mm Synergy stent. The mid right coronary artery was stented using a 4.0-mm x 16-mm Synergy stent lesion reduced from 80% to 0%. ECHO: 1/27/2019  Normal left ventricle size and systolic function with an estimated ejection  fraction of 55-60%. No regional wall motion abnormalities are seen. Mild concentric left ventricular hypertrophy. Normal function of all valves. C: 1/28/2019  CORONARY ANGIOGRAM:  1. There is single vessel disease in this right dominant circulation. 2.  Left main:  Free of disease. 3.  LAD in the proximal segment has 80% focal eccentric stenosis. The  first diagonal upper branch has a 95% stenosis. The rest of the vessel  has no major obstructive disease. 4.  Left circumflex artery. The previously stented site is widely  patent. The OM1 is patent as well. 5.  The right coronary artery also is a dominant vessel. The previously  placed stents are widely patent. There is mild luminal irregularities  in the distal RCA.     CONCLUSION:  1. Single vessel coronary artery disease with 80% proximal LAD and 95%  proximal D1 lesions. 2.  Normal left ventricular size and systolic function. 3.  Normal hemodynamics. TECHNICAL COMMENTS:  The guide engaged the ostium well and provided goodsupport.   Direct stenting of the proximal LAD was made.  Serial  inflation up to 16 atmospheres which corresponds to a vessel size of  approximately 3.2. Final angiography 0% residual.  Next, the wire was  removed and passed into the diagonal after predilatation. A 2.25 x  12-mm stent was deployed and postdilated to a vessel size of 2.5. Followup angiography shows 0% residual.    CONCLUSION:  1. Successful stenting of the proximal LAD lesion reduced from 80% to 0%. A 3.0 x 15 ISAIAS stent used. 2.  Successful stenting of the first diagonal.  A 2.25 x 12 ISAIAS stent used to a final diameter of 2.5. [facility]: 11/17/20  CORONARY ANGIOGRAPHY FINDINGS:  1. Left main:  Free of disease. 2.  LAD:  Ostium has an 80% lesion. Beyond that, there are two stents  in the proximal and mid LAD which are widely patent. The extreme distal  LAD at the apex has two tandem lesions in the 80% range. 3.  The first diagonal that was stented may have some in-stent stenosis. 4.  The left circumflex has no major obstructive disease. 5.  The ramus has no major obstructive disease. 6.  The right coronary artery has no major obstructive disease. 7.  All the stents that are placed are widely patent.     CONCLUSION:  1. High-grade ostial LAD narrowing in the 80% range; distal LAD has the  two tandem lesions in the 80% range. 2.  Normal left ventricular size and systolic function. 3.  Normal hemodynamics.     Brachial arteriogram was performed and shows widely patent brachial  artery. CONCLUSION:  Successful PCI and stenting of the ostial LAD. A 3.5 x 12  ISAIAS stent was placed. Successful stenting of the distal LAD. A 2.75 x  15 ISAIAS stent was used. Assessment and Plan:      CAD.   Presented in different ways each time  10/2006: previous stents to proximal RCA, prox Circumfles and mid LAD  Non obstructive CAD of mid-RCA and Diag 3  12/2016: stenting to mid RCA and proximal circumflex  1/2019: stent to proximal LAD and 1st diagonal   11/17/20: stent to ostial and distal LAD  Continue DAPT and BB    Essential Hypertension. (Over age 62, goal BP <150/90.)  BP stable   Typically runs high in the mornings   Work on low sodium diet. Paroxysmal atrial fibrillation. Today's EKG shows normal rhythm   CHADS-Vasc score 3 (HTN, AGE, CAD). Xarelto stopped due to hematuria; Tried Eliquis 2.5 bid but too expensive in addition to bleeding; hematuria   Also tried Pradaxa; hematuria  Patient will not take Warfarin because of diffficulty in management and admissions to hospital for coagulopathy from time to time. Besides that also causes hematuria. Not a candidate for Watchman given his difficult anatomy. Have discussed in detail risk of stroke. Discussed oral anticoagulation to decrease the risk of thromboembolic events including stroke. Benefits and alternatives were discussed with patient. Risk of bleeding was discussed. Patient verbalized understanding. Tried Xarelto again and patient stopped taking due to recurrence of bleeding. Has since resumed Plavix    Hyperlipidemia goal LDL <70  No statin therapy  Severe intolerance to statins (myalgias)  Started Praluent with some side effects; itching, runny nose, hot flashes; tolerable for now  Discussed and benefits outweigh risks so he will continue taking and call if these symptoms worsens. Has a 1 month supply of Praluent for the month of December. May apply in 2021 for patient assistance  Will have him try Rosuvastatin 5 mg twice weekly  Will also try Nexlizet once he completes Praluent doses. Repeat fasting lipid panel in 3 months     Follow up in 6 months     Thank you for allowing me to participate in the care of your patient. Liliana Russell MD     This note was scribed in the presence of Dr. Liliana Russell MD by [facility]  Physician Attestation:  The scribes documentation has been prepared under my direction and personally reviewed by me in its entirety.      I confirm that the note above accurately reflects all work, treatment, procedures, and medical decision making performed by me.

## 2020-12-18 ENCOUNTER — TELEPHONE (OUTPATIENT)
Dept: CARDIOLOGY CLINIC | Age: 80
End: 2020-12-18

## 2021-05-10 RX ORDER — ROSUVASTATIN CALCIUM 5 MG/1
TABLET, COATED ORAL
Qty: 24 TABLET | Refills: 2 | Status: SHIPPED | OUTPATIENT
Start: 2021-05-10 | End: 2021-06-11

## 2021-06-10 NOTE — PROGRESS NOTES
[address] [facility] [facility] [address] [facility] - Cardiology      Chief Complaint: \" I've been extremely fatigued and wear a Cpap. \"     History of present illness:   Cayla Evans is a 79 y.o. male with past medical history significant for CAD s/p multiple stents, atrial fibrillation, hypertension, DVT and hyperlipidemia. Diagnosed with severe MILY (8/2022) managed by pulmonology at [facility]. Left kidney CA managed by Dr. Esme Mckinnon. He continued to complain of chest pain and most recent [facility] 11/17/20 showed patent previously placed stents and LAD lesions in the 80%. Ostial and distal LAD were both stented. Patient returns in follow up and reports \"extreme fatigue and swelling. \" Had a gout attack and stopped taking Rosuvastatin since pharmacist informed him of an interaction with statin and gout medication. Reports driving when he had a sudden headache, felt like his eyes were bulging and unable to see vehicles in front of him. 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 \" Taking eye injections that are very expensive. He remains on Aspirin and Plavix. Will not re-trial [id number]-I due to cost. Has no new cardiac complaints today. Past Medical History:   Diagnosis Date    Anesthesia     hallucinations for prostate surgery     Arthritis     Atrial fibrillation ([address]     CAD (coronary artery disease)     Cancer (HCC)     Kidney    DVT (deep venous thrombosis) ([facility])     CORNELIUS legs post prostate surgery 5 yrs ago    Gout     Hx of blood clots     Hypertension      Past Surgical History:   Procedure Laterality Date    BACK SURGERY      3 surgeries total.  Lumbar 3&4,    CARDIAC CATHETERIZATION  2011    CARDIAC SURGERY      stents placed- 1 in 2000 and 2 in 2002.  3 surgeries total    CATARACT REMOVAL Bilateral     CHOLECYSTECTOMY      COLONOSCOPY      HERNIA REPAIR Left      X2.   Left flank and right inguinal    JOINT REPLACEMENT  2009    right knee replacement    KIDNEY SURGERY      tumor removed     NASAL SEPTUM SURGERY      PRESSURE ULCER DEBRIDEMENT      had a stage 4 wound on coccyx 6 years ago    PROSTATE SURGERY      2012-prostate removal due to enlargement (not cancerous)    TUMOR REMOVAL Left     kidney    VENA CAVA FILTER PLACEMENT      5 yrs ago     Social History     Tobacco Use    Smoking status: Former Smoker     Packs/day: 0.50     Years: 6.00     Pack years: 3.00     Types: Cigarettes     Quit date: 1966     Years since quittin.5    Smokeless tobacco: Never Used   Substance Use Topics    Alcohol use: No       Review of Systems:   Constitutional: No fatigue, weakness or significant change in weight. HEENT: No change in vision or ringing in the ears. Respiratory: No FINLEY, PND, orthopnea or cough. Cardiovascular: See HPI    GI: No n/v, abdominal pain or changes in bowel habits. No melena, no hematochezia  : No dysuria or hematuria. Skin: No rash or new skin lesions. Musculoskeletal: No new muscle or joint pain. Neurological: No syncope or TIA-like symptoms. Psychiatric: No anxiety, insomnia or depression    Physical Exam:  Vitals:    21 1324   BP: 138/70   Site: Left Upper Arm   Position: Supine   Cuff Size: Large Adult   Pulse: 71   Resp: (!) 97   Weight: 294 lb (133.4 kg)   Height: 5' 10\" (1.778 m)       General:  Awake, alert, oriented in NAD. Currently pain free  Skin:  Warm and dry. No unusual bruising or rash  Neck:  Supple. No JVD or carotid bruit appreciated  Chest:  Normal effort. Clear to auscultation, no wheezes/rhonchi/rales  Cardiovascular:  RRR, S1/S2, no murmur/gallop/rub.  Chest wall soreness reproducible on palpation and movement  Abdomen:  Soft, nontender, +bowel sounds  Extremities:  No edema  Neurological: No focal deficits  Psychological: Normal mood and affect      Current Outpatient Medications   Medication Sig Dispense Refill    Ascorbic Acid (VITAMIN C) 16-mm Synergy stent lesion reduced from 80% to 0%. ECHO 12/9/2016   Normal LV size and systolic function: EF is  86%. Grade I diastolic  dysfunction.   Trace mitral regurgitation is present.   The left atrium is normal in size.   Normal right ventricular size and function.   There is trace to mild tricuspid regurgitation with RVSP estimated at 40  mmHg. which is mildly elevated.   Trivial pulmonic regurgitation present.     Coronary Angiogram and PCI: 12/2016  1. Two-vessel coronary disease with two tandem lesions in the 80-90% range involving the proximal circumflex. 2. An 80% eccentric mid RCA lesion. 3. Normal left ventricular size and systolic function. 4. Normal hemodynamics  Successful stenting of the proximal circumflex. Two tandem lesions were reduced to 0% using a 3.0-mm x 34-mm Synergy stent. The mid right coronary artery was stented using a 4.0-mm x 16-mm Synergy stent lesion reduced from 80% to 0%. ECHO: 1/27/2019  Normal left ventricle size and systolic function with an estimated ejection  fraction of 55-60%. No regional wall motion abnormalities are seen. Mild concentric left ventricular hypertrophy. Normal function of all valves. [facility]: 1/28/2019  CORONARY ANGIOGRAM:  1. There is single vessel disease in this right dominant circulation. 2.  Left main:  Free of disease. 3.  LAD in the proximal segment has 80% focal eccentric stenosis. The  first diagonal upper branch has a 95% stenosis. The rest of the vessel  has no major obstructive disease. 4.  Left circumflex artery. The previously stented site is widely  patent. The OM1 is patent as well. 5.  The right coronary artery also is a dominant vessel. The previously  placed stents are widely patent. There is mild luminal irregularities  in the distal RCA.     CONCLUSION:  1. Single vessel coronary artery disease with 80% proximal LAD and 95%  proximal D1 lesions. 2.  Normal left ventricular size and systolic function.   3.  Normal hemodynamics. TECHNICAL COMMENTS:  The guide engaged the ostium well and provided goodsupport. Direct stenting of the proximal LAD was made. Serial  inflation up to 16 atmospheres which corresponds to a vessel size of  approximately 3.2. Final angiography 0% residual.  Next, the wire was  removed and passed into the diagonal after predilatation. A 2.25 x  12-mm stent was deployed and postdilated to a vessel size of 2.5. Followup angiography shows 0% residual.    CONCLUSION:  1. Successful stenting of the proximal LAD lesion reduced from 80% to 0%. A 3.0 x 15 ISAIAS stent used. 2.  Successful stenting of the first diagonal.  A 2.25 x 12 ISAIAS stent used to a final diameter of 2.5. [facility]: 11/17/20  CORONARY ANGIOGRAPHY FINDINGS:  1. Left main:  Free of disease. 2.  LAD:  Ostium has an 80% lesion. Beyond that, there are two stents  in the proximal and mid LAD which are widely patent. The extreme distal  LAD at the apex has two tandem lesions in the 80% range. 3.  The first diagonal that was stented may have some in-stent stenosis. 4.  The left circumflex has no major obstructive disease. 5.  The ramus has no major obstructive disease. 6.  The right coronary artery has no major obstructive disease. 7.  All the stents that are placed are widely patent.     CONCLUSION:  1. High-grade ostial LAD narrowing in the 80% range; distal LAD has the  two tandem lesions in the 80% range. 2.  Normal left ventricular size and systolic function. 3.  Normal hemodynamics.     Brachial arteriogram was performed and shows widely patent brachial  artery. CONCLUSION:  Successful PCI and stenting of the ostial LAD. A 3.5 x 12  ISAIAS stent was placed. Successful stenting of the distal LAD. A 2.75 x  15 ISAIAS stent was used.     Assessment and Plan:    LE edema / fatigue  Taking Furosemide PRN with no improvement  Will switch to Torsemide 20 mg daily PRN and arrange for labs (BNP)  Encouraged daily walking   BNP , BMP today    CAD. Presented in different ways each time  10/2006: previous stents to proximal RCA, prox Circumfles and mid LAD  Non obstructive CAD of mid-RCA and Diag 3  12/2016: stenting to mid RCA and proximal circumflex  1/2019: stent to proximal LAD and 1st diagonal   11/17/20: stent to ostial and distal LAD  Denies any angina on today's visit  Continue Aspirin and Plavix     Essential Hypertension. (Over age 62, goal BP <150/90.)  BP stable on today's visit   Will stop Valsartan-HCTZ due to frequent gout attacks  Take Valsartan 160 mg BID   Work on low sodium diet. Paroxysmal atrial fibrillation. Today's EKG shows normal rhythm   CHADS-Vasc score 3 (HTN, AGE, CAD). Xarelto stopped due to hematuria; Tried Eliquis 2.5 bid but too expensive in addition to bleeding; hematuria   Also tried Pradaxa; hematuria  Patient will not take Warfarin because of diffficulty in management and admissions to hospital for coagulopathy from time to time. Besides that also causes hematuria. Not a candidate for Watchman given his difficult anatomy. Have discussed in detail risk of stroke. Discussed oral anticoagulation to decrease the risk of thromboembolic events including stroke. Benefits and alternatives were discussed with patient. Risk of bleeding was discussed. Patient verbalized understanding. Tried Xarelto again and patient stopped taking due to recurrence of bleeding. Has since resumed Plavix    Hyperlipidemia goal LDL <70  No statin therapy  Severe intolerance to statins (myalgias)  Started Praluent with some side effects; itching, runny nose, hot flashes; tolerable for now  Discussed and benefits outweigh risks so he will continue taking and call if these symptoms worsens. Has a 1 month supply of Praluent for the month of December.  May apply in 2021 for patient assistance  Will have him try Rosuvastatin 5 mg twice weekly; this was discontinued after gout attack treated with Colchicine   Lipid panel from 6/10/21 shows LDL of 86       He is getting bouts of gout which may be related to hydrochlorothiazide and torsemide I have discontinued hydrochlorothiazide and I have asked him to use torsemide sparingly for lower extremity edema      Follow up in 6 months     Thank you for allowing me to participate in the care of your patient. Brad Dumont MD     This note was scribed in the presence of Dr. Brad Dumont MD by [facility]  Physician Attestation:  The scribes documentation has been prepared under my direction and personally reviewed by me in its entirety. I confirm that the note above accurately reflects all work, treatment, procedures, and medical decision making performed by me.

## 2021-06-11 ENCOUNTER — OFFICE VISIT (OUTPATIENT)
Dept: CARDIOLOGY CLINIC | Age: 81
End: 2021-06-11
Payer: MEDICARE

## 2021-06-11 VITALS
WEIGHT: 294 LBS | HEART RATE: 71 BPM | HEIGHT: 70 IN | DIASTOLIC BLOOD PRESSURE: 70 MMHG | SYSTOLIC BLOOD PRESSURE: 138 MMHG | RESPIRATION RATE: 97 BRPM | BODY MASS INDEX: 42.09 KG/M2

## 2021-06-11 DIAGNOSIS — E78.5 HYPERLIPIDEMIA LDL GOAL <70: ICD-10-CM

## 2021-06-11 DIAGNOSIS — R60.0 BILATERAL LEG EDEMA: ICD-10-CM

## 2021-06-11 DIAGNOSIS — I48.0 PAF (PAROXYSMAL ATRIAL FIBRILLATION) (HCC): ICD-10-CM

## 2021-06-11 DIAGNOSIS — I25.10 CAD IN NATIVE ARTERY: Primary | ICD-10-CM

## 2021-06-11 DIAGNOSIS — R53.83 OTHER FATIGUE: ICD-10-CM

## 2021-06-11 DIAGNOSIS — I10 ESSENTIAL HYPERTENSION: ICD-10-CM

## 2021-06-11 PROCEDURE — 99214 OFFICE O/P EST MOD 30 MIN: CPT | Performed by: INTERNAL MEDICINE

## 2021-06-11 PROCEDURE — G8417 CALC BMI ABV UP PARAM F/U: HCPCS | Performed by: INTERNAL MEDICINE

## 2021-06-11 PROCEDURE — 4040F PNEUMOC VAC/ADMIN/RCVD: CPT | Performed by: INTERNAL MEDICINE

## 2021-06-11 PROCEDURE — 93000 ELECTROCARDIOGRAM COMPLETE: CPT | Performed by: INTERNAL MEDICINE

## 2021-06-11 PROCEDURE — 1036F TOBACCO NON-USER: CPT | Performed by: INTERNAL MEDICINE

## 2021-06-11 PROCEDURE — G8427 DOCREV CUR MEDS BY ELIG CLIN: HCPCS | Performed by: INTERNAL MEDICINE

## 2021-06-11 PROCEDURE — 1123F ACP DISCUSS/DSCN MKR DOCD: CPT | Performed by: INTERNAL MEDICINE

## 2021-06-11 RX ORDER — VALSARTAN 160 MG/1
160 TABLET ORAL 2 TIMES DAILY
Qty: 180 TABLET | Refills: 5 | Status: SHIPPED
Start: 2021-06-11 | End: 2021-08-13

## 2021-06-11 RX ORDER — TORSEMIDE 20 MG/1
20 TABLET ORAL DAILY PRN
Qty: 30 TABLET | Refills: 5 | Status: SHIPPED | OUTPATIENT
Start: 2021-06-11 | End: 2022-10-12 | Stop reason: ALTCHOICE

## 2021-06-11 RX ORDER — VALSARTAN 160 MG/1
160 TABLET ORAL NIGHTLY
COMMUNITY
End: 2021-06-11 | Stop reason: DRUGHIGH

## 2021-06-11 RX ORDER — MULTIVIT WITH MINERALS/LUTEIN
1000 TABLET ORAL DAILY
COMMUNITY

## 2021-06-11 RX ORDER — ATENOLOL 50 MG/1
TABLET ORAL
Qty: 180 TABLET | Refills: 5 | Status: SHIPPED | OUTPATIENT
Start: 2021-06-11 | End: 2021-08-06 | Stop reason: ALTCHOICE

## 2021-06-11 NOTE — PATIENT INSTRUCTIONS
Patient Education        Leg and Ankle Edema: Care Instructions  Your Care Instructions  Swelling in the legs, ankles, and feet is called edema. It is common after you sit or stand for a while. Long plane flights or car rides often cause swelling in the legs and feet. You may also have swelling if you have to stand for long periods of time at your job. Problems with the veins in the legs (varicose veins) and changes in hormones can also cause swelling. Sometimes the swelling in the ankles and feet is caused by a more serious problem, such as heart failure, infection, blood clots, or liver or kidney disease. Follow-up care is a key part of your treatment and safety. Be sure to make and go to all appointments, and call your doctor if you are having problems. It's also a good idea to know your test results and keep a list of the medicines you take. How can you care for yourself at home? · If your doctor gave you medicine, take it as prescribed. Call your doctor if you think you are having a problem with your medicine. · Whenever you are resting, raise your legs up. Try to keep the swollen area higher than the level of your heart. · Take breaks from standing or sitting in one position. ? Walk around to increase the blood flow in your lower legs. ? Move your feet and ankles often while you stand, or tighten and relax your leg muscles. · Wear support stockings. Put them on in the morning, before swelling gets worse. · Eat a balanced diet. Lose weight if you need to. · Limit the amount of salt (sodium) in your diet. Salt holds fluid in the body and may increase swelling. When should you call for help? Call 911 anytime you think you may need emergency care. For example, call if:    · You have symptoms of a blood clot in your lung (called a pulmonary embolism). These may include:  ? Sudden chest pain. ? Trouble breathing. ? Coughing up blood.    Call your doctor now or seek immediate medical care if:    · You have signs of a blood clot, such as:  ? Pain in your calf, back of the knee, thigh, or groin. ? Redness and swelling in your leg or groin.     · You have symptoms of infection, such as:  ? Increased pain, swelling, warmth, or redness. ? Red streaks or pus. ? A fever. Watch closely for changes in your health, and be sure to contact your doctor if:    · Your swelling is getting worse.     · You have new or worsening pain in your legs.     · You do not get better as expected. Where can you learn more? Go to [email or web address] org and sign in to your [employer] account. Enter S485 in the Relead box to learn more about \"Leg and Ankle Edema: Care Instructions. \"     If you do not have an account, please click on the \"Sign Up Now\" link. Current as of: February 26, 2020               Content Version: 12.8  © 2006-2021 [employer]. Care instructions adapted under license by [facility] ([facility]). If you have questions about a medical condition or this instruction, always ask your healthcare professional. [place] [address] any warranty or liability for your use of this information. 1. Stop Valsartan-Hydrochlorothiazide and take Valsartan 160 mg twice daily. 2. Stop taking Furosemide. Instead take Torsemide 20 mg daily as needed for swelling. 3. Labs today in [address].

## 2021-07-15 ENCOUNTER — TELEPHONE (OUTPATIENT)
Dept: CARDIOLOGY CLINIC | Age: 81
End: 2021-07-15

## 2021-07-15 NOTE — TELEPHONE ENCOUNTER
Received a call from pt stating he was having some chest pain yesterday. He woke up around 1:41 this AM and he took a nitro and went back to sleep. Pain did go away. He just woke some time ago and is not having any pain/symptoms. He was concerned because yesterday he did have some right jaw pain and a burning sensation in this throat and he noticed this while eating. Discussed with Zahraa HICKMAN and we'll have pt to continue to monitor symptoms and if he has chest pain upon exertion to call us back.     Informed of recs, v/u.

## 2021-08-04 DIAGNOSIS — I48.0 PAF (PAROXYSMAL ATRIAL FIBRILLATION) (HCC): ICD-10-CM

## 2021-08-04 DIAGNOSIS — I49.9 IRREGULAR HEARTBEAT: ICD-10-CM

## 2021-08-04 DIAGNOSIS — I10 ESSENTIAL HYPERTENSION, BENIGN: Primary | ICD-10-CM

## 2021-08-04 NOTE — TELEPHONE ENCOUNTER
Dr. Veena Qiu, patient has been monitoring BP at home and it has been elevated. Previously controlled when he was taking Valsartan-HCTZ that was discontinued due to frequent gout attacks. He is currently taking PRN Torsemide 20 mg daily, Valsartan 160 mg bid and Atenolol 50 mg bid. Would you like to add another medication for better control?

## 2021-08-04 NOTE — TELEPHONE ENCOUNTER
Elida Polanco called in this afternoon stating that his BP has been running high for the past 2/3 days. Some readings were:    185/100  165/93  174/98    He said he has been taking his medication regularly. Denies all other symptoms.      You can reach Elida Polanco at #[phone]

## 2021-08-05 NOTE — TELEPHONE ENCOUNTER
Please call patient with medication adjustments and be sure to update med list, order labs and send Rx to Dr. Eladia Ac for approval, thanks.

## 2021-08-05 NOTE — TELEPHONE ENCOUNTER
Discontinue atenolol  Lets try labetalol 200 mg twice a day  Also add spironolactone 25 mg daily  Check a renal panel in a week

## 2021-08-06 NOTE — TELEPHONE ENCOUNTER
Called and spoke with patient, stated he would like his blood work sent to [facility]. Patient also inquired about his cmp and bnp results. He would like a call back with these results.

## 2021-08-06 NOTE — TELEPHONE ENCOUNTER
Labs reviewed from 7/30/21, kidney function stable and BNP normal. Please call normal lab results and fax orders as patient requested. Verify pharmacy patient would like prescription sent to and pend for Dr. Lena Gutierrez to sign.

## 2021-08-09 RX ORDER — SPIRONOLACTONE 25 MG/1
25 TABLET ORAL DAILY
Qty: 30 TABLET | Refills: 3 | Status: SHIPPED | OUTPATIENT
Start: 2021-08-09 | End: 2021-12-09

## 2021-08-09 RX ORDER — LABETALOL 200 MG/1
200 TABLET, FILM COATED ORAL 2 TIMES DAILY
Qty: 60 TABLET | Refills: 3 | Status: SHIPPED | OUTPATIENT
Start: 2021-08-09 | End: 2021-08-17 | Stop reason: DRUGHIGH

## 2021-08-13 RX ORDER — VALSARTAN 160 MG/1
TABLET ORAL
Qty: 90 TABLET | Refills: 3 | Status: SHIPPED | OUTPATIENT
Start: 2021-08-13 | End: 2021-08-17 | Stop reason: SDUPTHER

## 2021-08-13 NOTE — TELEPHONE ENCOUNTER
He should continue to monitor blood pressure over the weekend since the medication was started only a few days ago.  Ask him to call Monday with an update on blood pressure

## 2021-08-13 NOTE — TELEPHONE ENCOUNTER
Last OV: 6/11/21  Next OV: 12/13/21  Last refill:6/11/21  Most recent Labs: 8/6/21  Last PT/INR (if needed):1/27/19  Last EKG (if needed):

## 2021-08-13 NOTE — TELEPHONE ENCOUNTER
Francis called about the new RX - labetalol, 200 mg, twice per day- not going well - blood pressure readings below;  195/105 - then took a couple of deep breaths, then it was 186/96- final reading was 182/92.     Would like some advice, [phone]

## 2021-08-17 RX ORDER — VALSARTAN 160 MG/1
TABLET ORAL
Qty: 180 TABLET | Refills: 3 | Status: SHIPPED | OUTPATIENT
Start: 2021-08-17 | End: 2021-08-27

## 2021-08-17 RX ORDER — CHLORTHALIDONE 25 MG/1
25 TABLET ORAL DAILY
Qty: 90 TABLET | Refills: 3 | Status: SHIPPED | OUTPATIENT
Start: 2021-08-17 | End: 2022-03-11

## 2021-08-17 RX ORDER — LABETALOL 300 MG/1
300 TABLET, FILM COATED ORAL 2 TIMES DAILY
Qty: 180 TABLET | Refills: 3 | Status: SHIPPED
Start: 2021-08-17 | End: 2021-08-27 | Stop reason: SDUPTHER

## 2021-08-17 NOTE — TELEPHONE ENCOUNTER
Patient returned call with BP readings. 186/101- today pulse 87  177/98- today  177/105- today    185/93-yesterday pulse 95  168/86- yesterday    He states he wasn't taking atenolol for 53 years and your not supposed to stop taking atenolol \"cold turkey\". He states since stop taking atenolol his pulse has been up to 105 at rest. He states he could have had a heart attack stopping this medication so abruptly. He is wondering if Dr Anne Rodriguez is actually reviewing this messages when he calls in. He doesn't want a nurse making decisions about his medications.

## 2021-08-17 NOTE — TELEPHONE ENCOUNTER
Dr. Dacia Garcia spoke with patient and informed him that Labetalol is in the same family as Atenolol. After further discussion with patient Dr. Dacia Garcia would like him to take Labetalol 300 mg bid, continue Valsartan 160 mg bid and add Chlorthalidone 25 mg daily. Also instructed to maintain a low sodium diet.

## 2021-08-27 RX ORDER — VALSARTAN 160 MG/1
TABLET ORAL
Qty: 180 TABLET | Refills: 3 | Status: SHIPPED | OUTPATIENT
Start: 2021-08-27 | End: 2022-02-17 | Stop reason: SDUPTHER

## 2021-08-27 RX ORDER — LABETALOL 300 MG/1
300 TABLET, FILM COATED ORAL 2 TIMES DAILY
Qty: 180 TABLET | Refills: 3 | Status: SHIPPED | OUTPATIENT
Start: 2021-08-27

## 2021-08-27 NOTE — TELEPHONE ENCOUNTER
Called and spoke with patient, he states he needs a script for labetalol 300 BID sent to the pharmacy. I updated patients chart to reflect Losartan 160 mg BID. Patient called his pcp's office and received the correct prescription for their office. Patient had no further questions or concerns.

## 2021-08-27 NOTE — TELEPHONE ENCOUNTER
Patient called and was extremely upset with our office and Dr. Errol Virk. Stated he called us for a refill on 8/17/21  And he says it was called in wrong and told me do \"we think he's stupid\"  The medication called in was valsartan (DIOVAN) 160 MG tablet - Take 1 table by mouth daily. Francis solano it was to be twice a day. He then called his PCP can he sent in a script for twice a day. Another medication that was called in was labetalol and patient did pick that prescription up on 8-6-21 before we even sent the prescription and not sure what doctor sent that script over. Sarina Gomes was just talking over me and I could not get a word in to ask more questions. He was just mad and rambled on stating that we thought he was stupid. He can be reached at [phone]     Not sure what his question is and why he called because he has his medication.   I guess maybe call him to see what exactly what Dr. Errol Virk wants him to take daily

## 2021-09-16 ENCOUNTER — TELEPHONE (OUTPATIENT)
Dept: CARDIOLOGY CLINIC | Age: 81
End: 2021-09-16

## 2021-09-16 DIAGNOSIS — R60.0 BILATERAL LEG EDEMA: Primary | ICD-10-CM

## 2021-09-16 NOTE — TELEPHONE ENCOUNTER
Called patient with results of Echo, scanned to media. Informed I will have Dr. Jonita Favre review and give recommendations. He c/o bilateral leg edema and we discussed him taking Torsemide for a few days since it has been prescribed daily as needed. I will fax lab orders to [facility] for him to complete next week. He v/u.

## 2021-09-16 NOTE — TELEPHONE ENCOUNTER
Discussed with Dr. Errol Virk and personally reviewed Echo; no new recommendations. He agrees with the plan to take Torsemide for a few days and repeat labs. Patient notified and labs faxed to [facility]. He v/u to all.

## 2021-09-16 NOTE — TELEPHONE ENCOUNTER
Joanna Murray called in this morning and wanted to make sure we received the echo from [facility] and would like to know results of that test. He states he is swollen and is tired of waiting to see what is going on.     He can be reached at [phone]

## 2021-12-09 RX ORDER — SPIRONOLACTONE 25 MG/1
TABLET ORAL
Qty: 90 TABLET | Refills: 1 | Status: SHIPPED | OUTPATIENT
Start: 2021-12-09 | End: 2022-03-31

## 2021-12-10 NOTE — PROGRESS NOTES
[facility] - Cardiology      Chief Complaint: \" lower leg edema. \"     History of present illness:   Jacqueline Melendez is a 79 y.o. male with past medical history significant for CAD s/p multiple stents, atrial fibrillation, hypertension, DVT and hyperlipidemia. Diagnosed with severe MILY (8/2022) managed by pulmonology at [facility]. Left kidney CA managed by Dr. Emely Locke. He continued to complain of chest pain and most recent [address] [facility] 11/17/20 showed patent previously placed stents and LAD lesions in the 80%. Ostial and distal LAD were both stented. Returns in follow up for management of CAD. Says he is \"not too bad. \" Continues to have bilateral lower extremity edema and has not noticed any improvement. Unable to walk long distances due to leg fatigue. Has no other cardiac complaints today. Says he and his wife purchased a mobile home and he is currently  working on upgrades. Has been taking all medication as prescribed. Denies any abnormal bruising or bleeding. Specifically denies any chest pain, pressure, tightness, nausea, vomiting, diaphoresis, SOB/FINLEY, palpitations, heart racing, dizziness/lightheadedness, orthopnea, PND or syncope. Past Medical History:   Diagnosis Date    Anesthesia     hallucinations for prostate surgery     Arthritis     Atrial fibrillation ([address]     CAD (coronary artery disease)     Cancer (HCC)     Kidney    DVT (deep venous thrombosis) (HCC)     CORNELIUS legs post prostate surgery 5 yrs ago    Gout     Hx of blood clots     Hypertension      Past Surgical History:   Procedure Laterality Date    BACK SURGERY      3 surgeries total.  Lumbar 3&4,    CARDIAC CATHETERIZATION  2011    CARDIAC SURGERY      stents placed- 1 in 2000 and 2 in 2002.  3 surgeries total    CATARACT REMOVAL Bilateral     CHOLECYSTECTOMY      COLONOSCOPY      HERNIA REPAIR Left      X2.   Left flank and right inguinal    JOINT REPLACEMENT  2009    right knee replacement    KIDNEY SURGERY      tumor removed     NASAL SEPTUM SURGERY      PRESSURE ULCER DEBRIDEMENT      had a stage 4 wound on coccyx 6 years ago    PROSTATE SURGERY      2012-prostate removal due to enlargement (not cancerous)    TUMOR REMOVAL Left     kidney    VENA CAVA FILTER PLACEMENT      5 yrs ago     Social History     Tobacco Use    Smoking status: Former Smoker     Packs/day: 0.50     Years: 6.00     Pack years: 3.00     Types: Cigarettes     Quit date: 1966     Years since quittin.4    Smokeless tobacco: Never Used   Substance Use Topics    Alcohol use: No       Review of Systems:   Constitutional: No fatigue, weakness or significant change in weight. HEENT: No change in vision or ringing in the ears. Respiratory: No FINLEY, PND, orthopnea or cough. Cardiovascular: See HPI    GI: No n/v, abdominal pain or changes in bowel habits. No melena, no hematochezia  : No dysuria or hematuria. Skin: No rash or new skin lesions. Musculoskeletal: No new muscle or joint pain. Neurological: No syncope or TIA-like symptoms. Psychiatric: No anxiety, insomnia or depression    Physical Exam:  Vitals:    21 1049   BP: 132/84   Pulse: 73   SpO2: 97%   Weight: (!) 301 lb (136.5 kg)   Height: 5' 10\" (1.778 m)       General:  Awake, alert, oriented in NAD. Currently pain free  Skin:  Warm and dry. No unusual bruising or rash  Neck:  Supple. No JVD or carotid bruit appreciated  Chest:  Normal effort. Clear to auscultation, no wheezes/rhonchi/rales  Cardiovascular:  RRR, S1/S2, no murmur/gallop/rub.  Chest wall soreness reproducible on palpation and movement  Abdomen:  Soft, nontender, +bowel sounds  Extremities:  No edema  Neurological: No focal deficits  Psychological: Normal mood and affect      Current Outpatient Medications   Medication Sig Dispense Refill    zinc gluconate 50 MG tablet Take 50 mg by mouth daily      pantoprazole (PROTONIX) 40 MG tablet Take 1 tablet by mouth every morning (before breakfast) 90 tablet 5    spironolactone (ALDACTONE) 25 MG tablet TAKE 1 TABLET BY MOUTH EVERY DAY 90 tablet 1    labetalol (NORMODYNE) 300 MG tablet Take 1 tablet by mouth 2 times daily 180 tablet 3    valsartan (DIOVAN) 160 MG tablet TAKE 1 TABLET BY MOUTH TWICE DAILY 180 tablet 3    chlorthalidone (HYGROTON) 25 MG tablet Take 1 tablet by mouth daily 90 tablet 3    Ascorbic Acid (VITAMIN C) 250 MG tablet Take 1,000 mg by mouth daily       torsemide (DEMADEX) 20 MG tablet Take 1 tablet by mouth daily as needed (LE edema) 30 tablet 5    aspirin 81 MG chewable tablet Take 1 tablet by mouth daily 30 tablet 3    clopidogrel (PLAVIX) 75 MG tablet TAKE 1 TABLET BY MOUTH EVERY DAY 90 tablet 3    Magnesium Gluconate 27.5 MG TABS Take 500 mg by mouth      b complex vitamins capsule Take 1 capsule by mouth daily      Cholecalciferol (VITAMIN D3) 2000 UNITS CAPS Take 1 capsule by mouth daily       nitroGLYCERIN (NITROSTAT) 0.4 MG SL tablet Place 1 tablet under the tongue every 5 minutes as needed for Chest pain 25 tablet 3     No current facility-administered medications for this visit. Labs:   Lab Results   Component Value Date    WBC 7.5 11/18/2020    HGB 14.6 11/18/2020    HCT 43.7 11/18/2020    MCV 90.2 11/18/2020     (L) 11/18/2020     Lab Results   Component Value Date     07/30/2021    K 4.4 07/30/2021    K 3.8 01/29/2019     07/30/2021    CO2 33.0 07/30/2021    BUN 17 07/30/2021    CREATININE 1.13 07/30/2021    GLUCOSE 96 07/30/2021    CALCIUM 9.7 07/30/2021      Lab Results   Component Value Date    TRIG 116 06/10/2021    HDL 23 06/10/2021    HDL 34 07/20/2011    LDLCALC 86 06/10/2021    LDLDIRECT 126 11/16/2020    LABVLDL see below 11/16/2020       EKG    12/13/21  NSR, ~78 bpm.           CATH 12/9/16  1. Two-vessel coronary disease with two tandem lesions in the 80-90% range involving the proximal circumflex. 2. An 80% eccentric mid RCA lesion.   3. Normal left ventricular size and systolic function. 4. Normal hemodynamics.     Successful stenting of the proximal circumflex. Two tandem lesions were reduced to 0% using a 3.0-mm x 34-mm Synergy stent. The mid right coronary artery was stented using a 4.0-mm x 16-mm Synergy stent lesion reduced from 80% to 0%. ECHO 12/9/2016   Normal LV size and systolic function: EF is  12%. Grade I diastolic  dysfunction.   Trace mitral regurgitation is present.   The left atrium is normal in size.   Normal right ventricular size and function.   There is trace to mild tricuspid regurgitation with RVSP estimated at 40  mmHg. which is mildly elevated.   Trivial pulmonic regurgitation present.     Coronary Angiogram and PCI: 12/2016  1. Two-vessel coronary disease with two tandem lesions in the 80-90% range involving the proximal circumflex. 2. An 80% eccentric mid RCA lesion. 3. Normal left ventricular size and systolic function. 4. Normal hemodynamics  Successful stenting of the proximal circumflex. Two tandem lesions were reduced to 0% using a 3.0-mm x 34-mm Synergy stent. The mid right coronary artery was stented using a 4.0-mm x 16-mm Synergy stent lesion reduced from 80% to 0%. ECHO: 1/27/2019  Normal left ventricle size and systolic function with an estimated ejection  fraction of 55-60%. No regional wall motion abnormalities are seen. Mild concentric left ventricular hypertrophy. Normal function of all valves. [facility]: 1/28/2019  CORONARY ANGIOGRAM:  1. There is single vessel disease in this right dominant circulation. 2.  Left main:  Free of disease. 3.  LAD in the proximal segment has 80% focal eccentric stenosis. The  first diagonal upper branch has a 95% stenosis. The rest of the vessel  has no major obstructive disease. 4.  Left circumflex artery. The previously stented site is widely  patent. The OM1 is patent as well. 5.  The right coronary artery also is a dominant vessel.   The previously  placed Successful stenting of the distal LAD. A 2.75 x  15 ISAIAS stent was used. Assessment and Plan:    LE edema  Bilateral   No improvement despite the use of diuretic therapy  Says he is not able to walk long distances due to leg fatigue  Encouraged to keep legs elevated when seated, try to walk as much as possible and continue diuretic     CAD. Presented in different ways each time  10/2006: previous stents to proximal RCA, prox Circumfles and mid LAD  Non obstructive CAD of mid-RCA and Diag 3  12/2016: stenting to mid RCA and proximal circumflex  1/2019: stent to proximal LAD and 1st diagonal   11/17/20: stent to ostial and distal LAD  Continue Aspirin and Plavix     Essential Hypertension. (Over age 62, goal BP <150/90.)  BP is controlled  Continue medical management and low sodium diet     Paroxysmal atrial fibrillation. Today's EKG shows normal rhythm   CHADS-Vasc score 3 (HTN, AGE, CAD). Xarelto stopped due to hematuria; Tried Eliquis 2.5 bid but too expensive in addition to bleeding; hematuria   Also tried Pradaxa; hematuria  Patient will not take Warfarin because of diffficulty in management and admissions to hospital for coagulopathy from time to time. Besides that also causes hematuria. Not a candidate for Watchman given his difficult anatomy. Have discussed in detail risk of stroke. Discussed oral anticoagulation to decrease the risk of thromboembolic events including stroke. Benefits and alternatives were discussed with patient. Risk of bleeding was discussed. Patient verbalized understanding. Tried Xarelto again and patient stopped taking due to recurrence of bleeding. Has since resumed Plavix    Hyperlipidemia goal LDL <70  No statin therapy  Severe intolerance to statins (myalgias)  Started Praluent with some side effects; itching, runny nose, hot flashes  Will have him try Rosuvastatin 5 mg twice weekly; Reviewed lipid panel from 6/10/21; LDL 86.      Acid reflux  Will prescribe Protonix 40 mg daily     Follow up in 6 months     Thank you for allowing me to participate in the care of your patient. Mr. 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 I have asked him to take extra diuretic intermittently he has intermittent chest pain which he thinks is not from the heart because it is nonexertional.  I will try Protonix to see if that helps      Ventura Kim MD     This note was scribed in the presence of Dr. Ventura Kim MD by Kinga Chicas  Physician Attestation:  The scribes documentation has been prepared under my direction and personally reviewed by me in its entirety. I confirm that the note above accurately reflects all work, treatment, procedures, and medical decision making performed by me.

## 2021-12-13 ENCOUNTER — OFFICE VISIT (OUTPATIENT)
Dept: CARDIOLOGY CLINIC | Age: 81
End: 2021-12-13
Payer: MEDICARE

## 2021-12-13 VITALS
HEART RATE: 73 BPM | WEIGHT: 301 LBS | DIASTOLIC BLOOD PRESSURE: 84 MMHG | HEIGHT: 70 IN | OXYGEN SATURATION: 97 % | BODY MASS INDEX: 43.09 KG/M2 | SYSTOLIC BLOOD PRESSURE: 132 MMHG

## 2021-12-13 DIAGNOSIS — I10 PRIMARY HYPERTENSION: ICD-10-CM

## 2021-12-13 DIAGNOSIS — K21.9 MILD ACID REFLUX: ICD-10-CM

## 2021-12-13 DIAGNOSIS — I48.0 PAF (PAROXYSMAL ATRIAL FIBRILLATION) (HCC): ICD-10-CM

## 2021-12-13 DIAGNOSIS — E78.5 HYPERLIPIDEMIA LDL GOAL <70: ICD-10-CM

## 2021-12-13 DIAGNOSIS — I25.10 CAD IN NATIVE ARTERY: Primary | ICD-10-CM

## 2021-12-13 PROCEDURE — 4040F PNEUMOC VAC/ADMIN/RCVD: CPT | Performed by: INTERNAL MEDICINE

## 2021-12-13 PROCEDURE — G8427 DOCREV CUR MEDS BY ELIG CLIN: HCPCS | Performed by: INTERNAL MEDICINE

## 2021-12-13 PROCEDURE — 99214 OFFICE O/P EST MOD 30 MIN: CPT | Performed by: INTERNAL MEDICINE

## 2021-12-13 PROCEDURE — G8484 FLU IMMUNIZE NO ADMIN: HCPCS | Performed by: INTERNAL MEDICINE

## 2021-12-13 PROCEDURE — G8417 CALC BMI ABV UP PARAM F/U: HCPCS | Performed by: INTERNAL MEDICINE

## 2021-12-13 PROCEDURE — 1036F TOBACCO NON-USER: CPT | Performed by: INTERNAL MEDICINE

## 2021-12-13 PROCEDURE — 1123F ACP DISCUSS/DSCN MKR DOCD: CPT | Performed by: INTERNAL MEDICINE

## 2021-12-13 RX ORDER — PANTOPRAZOLE SODIUM 40 MG/1
40 TABLET, DELAYED RELEASE ORAL
Qty: 90 TABLET | Refills: 5 | Status: SHIPPED | OUTPATIENT
Start: 2021-12-13

## 2021-12-13 RX ORDER — ZINC GLUCONATE 50 MG
50 TABLET ORAL DAILY
COMMUNITY

## 2021-12-13 NOTE — PATIENT INSTRUCTIONS
Patient Education        Leg and Ankle Edema: Care Instructions  Your Care Instructions  Swelling in the legs, ankles, and feet is called edema. It is common after you sit or stand for a while. Long plane flights or car rides often cause swelling in the legs and feet. You may also have swelling if you have to stand for long periods of time at your job. Problems with the veins in the legs (varicose veins) and changes in hormones can also cause swelling. Sometimes the swelling in the ankles and feet is caused by a more serious problem, such as heart failure, infection, blood clots, or liver or kidney disease. Follow-up care is a key part of your treatment and safety. Be sure to make and go to all appointments, and call your doctor if you are having problems. It's also a good idea to know your test results and keep a list of the medicines you take. How can you care for yourself at home? · If your doctor gave you medicine, take it as prescribed. Call your doctor if you think you are having a problem with your medicine. · Whenever you are resting, raise your legs up. Try to keep the swollen area higher than the level of your heart. · Take breaks from standing or sitting in one position. ? Walk around to increase the blood flow in your lower legs. ? Move your feet and ankles often while you stand, or tighten and relax your leg muscles. · Wear support stockings. Put them on in the morning, before swelling gets worse. · Eat a balanced diet. Lose weight if you need to. · Limit the amount of salt (sodium) in your diet. Salt holds fluid in the body and may increase swelling. When should you call for help? Call 911 anytime you think you may need emergency care. For example, call if:    · You have symptoms of a blood clot in your lung (called a pulmonary embolism). These may include:  ? Sudden chest pain. ? Trouble breathing. ? Coughing up blood.    Call your doctor now or seek immediate medical care if:    · You have signs of a blood clot, such as:  ? Pain in your calf, back of the knee, thigh, or groin. ? Redness and swelling in your leg or groin.     · You have symptoms of infection, such as:  ? Increased pain, swelling, warmth, or redness. ? Red streaks or pus. ? A fever. Watch closely for changes in your health, and be sure to contact your doctor if:    · Your swelling is getting worse.     · You have new or worsening pain in your legs.     · You do not get better as expected. Where can you learn more? Go to https://Fashion Projectpepiceweb.SensorWave. org and sign in to your [employer] account. Enter N569 in the [employer] box to learn more about \"Leg and Ankle Edema: Care Instructions. \"     If you do not have an account, please click on the \"Sign Up Now\" link. Current as of: July 1, 2021               Content Version: 13.0  © 2006-2021 [employer]. Care instructions adapted under license by [facility] ([facility]). If you have questions about a medical condition or this instruction, always ask your healthcare professional. [place] [address] any warranty or liability for your use of this information. Patient Education        Learning About Acid-Reducing Medicines  What are they? Acid-reducing medicines can help relieve heartburn and other symptoms of indigestion. They can help prevent damage to your digestive system from stomach acids. They also are used to treat reflux and ulcer symptoms. These medicines include H2 blockers and proton pump inhibitors (PPIs). They help your stomach make less acid. You can buy them over the counter. Some of them also come in prescription strengths. Antacids can also help relieve heartburn symptoms. They reduce the acid that is already in your stomach. You can buy them over the counter. Which medicine is best for you depends on what is causing your symptoms. How do they work? Acid-reducing medicines work in two ways.  H2 blockers and proton pump inhibitors (PPIs) lower the amount of acid your stomach makes. They don't work on the acid that's already there. Antacids work by making stomach juices less acidic. But your heartburn may come back as your stomach makes more acid. What are some examples? Examples of acid reducers include:  H2 blockers. · Tagamet (cimetidine)  · Pepcid (famotidine)  Proton pump inhibitors (PPIs). · Nexium (esomeprazole)  · Prevacid (lansoprazole)  · Prilosec, Zegerid (omeprazole)  · Protonix (pantoprazole)  · Aciphex (rabeprazole)  Antacids. · Gaviscon  · Mylanta  · Maalox  · Tums  What are side effects might you have? Many people don't have side effects. And minor side effects might go away after a while. H2 blockers can cause headaches or make you dizzy. They might cause diarrhea or constipation. You may have nausea and vomiting. PPIs can cause headaches and diarrhea. Using them for a long time may raise your risk for infections or broken bones. Some antacids can cause constipation or diarrhea. The brands vary in the ingredients they use. They can have different side effects. If you use too much heartburn medicine, your body may not get enough of some minerals from your food. How can you take these medicines safely? Some H2 blockers and PPIs can affect how other medicines work. Tell your doctor if you use other medicines. He or she may change the dose or give you a different medicine. Many antacids have aspirin in them. Read the label to make sure that you don't take too much. Too much aspirin can be harmful. Be safe with medicines. Take your medicines exactly as prescribed. If you take over-the-counter medicine, be sure to read and follow all instructions on the label. Call your doctor if you think you are having a problem with your medicine. Check with your doctor or pharmacist before you use any other medicines. This includes over-the-counter medicines.  Tell your doctor about all of the medicines, vitamins, herbal products, and supplements you take. Taking some medicines together can cause problems. Follow-up care is a key part of your treatment and safety. Be sure to make and go to all appointments, and call your doctor if you are having problems. It's also a good idea to know your test results and keep a list of the medicines you take. Where can you learn more? Go to https://Elite Pharmaceuticalspepiceweb.CollegeMapper. org and sign in to your [employer] [place] account. Enter [phone] in the [employer] box to learn more about \"Learning About Acid-Reducing Medicines. \"     If you do not have an account, please click on the \"Sign Up Now\" link. Current as of: February 10, 2021               Content Version: 13.0  © 8992-2102 Healthwise, Incorporated. Care instructions adapted under license by [facility] ([facility]). If you have questions about a medical condition or this instruction, always ask your healthcare professional. [place] [address] any warranty or liability for your use of this information.

## 2021-12-14 PROCEDURE — 93000 ELECTROCARDIOGRAM COMPLETE: CPT | Performed by: INTERNAL MEDICINE

## 2022-02-17 RX ORDER — VALSARTAN 160 MG/1
TABLET ORAL
Qty: 180 TABLET | Refills: 2 | Status: SHIPPED | OUTPATIENT
Start: 2022-02-17 | End: 2022-10-12 | Stop reason: SDUPTHER

## 2022-03-08 ENCOUNTER — TELEPHONE (OUTPATIENT)
Dept: CARDIOLOGY CLINIC | Age: 82
End: 2022-03-08

## 2022-03-08 DIAGNOSIS — I25.10 CAD IN NATIVE ARTERY: Primary | ICD-10-CM

## 2022-03-08 DIAGNOSIS — R60.0 BILATERAL LEG EDEMA: ICD-10-CM

## 2022-03-08 NOTE — TELEPHONE ENCOUNTER
Patient had labs completed 7/2021 but with current medication regimen reasonable to have lab work completed if not completed by the primary care physician. Orders placed for routine labs. Please call Francis to notify he may complete the lab work if not already done by the PCP.

## 2022-03-08 NOTE — TELEPHONE ENCOUNTER
Called patient to let him know of message below and he v/u. Patient requested me to send lab orders to [facility] and wants to know if he needs to have labs done every three months. Orders have been faxed. Patient states he has been getting some really bad leg cramps the past 3 days. Patient thinks he may be getting the cramps from his new medication chlorthalidone and spironolactone. Please advise.

## 2022-03-08 NOTE — TELEPHONE ENCOUNTER
Nilson Bronson is calling in stating that he has been getting leg cramps for the last 3 days. He states that the last time he was seen by Dr. Cynthia Muniz he was told that he would get labs drawn periodically. I do see that there are active orders in the system, but wanted to make sure those are the correct labs that need to be ordered. Nilson Bronson can be reached at [phone].

## 2022-03-09 NOTE — TELEPHONE ENCOUNTER
Dr. Rony Cardona,   Patient had called yesterday with onset of leg cramping 3 days prior. Our office instructed to obtain routine labs which were  completed today for your review. After reviewing labs & his medications please advise any adjustments at this time? Repeat labs? He is due to return in follow up 6/2022. Thanks.

## 2022-03-11 DIAGNOSIS — N17.9 AKI (ACUTE KIDNEY INJURY) (HCC): Primary | ICD-10-CM

## 2022-03-11 RX ORDER — CHLORTHALIDONE 25 MG/1
TABLET ORAL
Qty: 45 TABLET | Refills: 3
Start: 2022-03-11

## 2022-03-11 NOTE — TELEPHONE ENCOUNTER
Left patient a message to confirm the instructions per Dr. Richter Loud so that I can update medication list.

## 2022-03-11 NOTE — TELEPHONE ENCOUNTER
Spoke to patient and he states that Dr. Wael Gr told him to stop the Chlorthalidone for three days and then cut the tablet in half after that. He also was told by Dr. Wale Gr to have blood work redone in 2 weeks to recheck kidney function.

## 2022-03-11 NOTE — TELEPHONE ENCOUNTER
I called and talked to him and I think I told him to cut back on his chlorthalidone to 3 days a week that is my recollection

## 2022-03-29 DIAGNOSIS — I10 PRIMARY HYPERTENSION: Primary | ICD-10-CM

## 2022-03-29 DIAGNOSIS — R06.02 SOB (SHORTNESS OF BREATH): ICD-10-CM

## 2022-03-31 RX ORDER — SPIRONOLACTONE 25 MG/1
TABLET ORAL
Qty: 90 TABLET | Refills: 1 | Status: SHIPPED | OUTPATIENT
Start: 2022-03-31 | End: 2022-06-30 | Stop reason: SDUPTHER

## 2022-06-01 ENCOUNTER — TELEPHONE (OUTPATIENT)
Dept: CARDIOLOGY CLINIC | Age: 82
End: 2022-06-01

## 2022-06-01 NOTE — TELEPHONE ENCOUNTER
Received fax from [facility] requesting information on when last stent was placed, looks like on 11/17/20. Also stated pt will require multiple surgical extractions with local anesthesia containing 2% lidocaine with epinephrine [id number], are there any contraindications to this treatment? Fax scanned into chart.     Phone# [phone]  Fax# [phone]    LM to see if dentist would like to hold Plavix and ASA and for how long

## 2022-06-07 NOTE — TELEPHONE ENCOUNTER
Preoperative risk assessment  Received a call from Dr Brian Rojas stating that patient needs to have some teeth extracted. He states that he is comfortable doing this while on Plavix. He just wants to make sure that he is okay to undergo teeth extraction from a cardiac standpoint.        Fax [phone]

## 2022-06-27 RX ORDER — ROSUVASTATIN CALCIUM 5 MG/1
TABLET, COATED ORAL
Qty: 24 TABLET | Refills: 2 | OUTPATIENT
Start: 2022-06-27

## 2022-06-27 NOTE — TELEPHONE ENCOUNTER
Last OV: 12/13/21  Next OV: 6/30/22  Last refill: dc 6/11/21  Most recent Labs: 3/9/22  Last EKG (if needed):12/14/21

## 2022-06-27 NOTE — PROGRESS NOTES
[address] - Cardiology      Chief Complaint: \" lower leg edema. \"     History of present illness:   Kyle Roa is a 79 y.o. male with past medical history significant for CAD s/p multiple stents, atrial fibrillation, hypertension, DVT and hyperlipidemia. Diagnosed with severe MILY (8/2022) managed by pulmonology at [facility]. Left kidney CA managed by Dr. Noel Weir. He continued to complain of chest pain and most recent [address] [facility] 11/17/20 showed patent previously placed stents and LAD lesions in the 80%. Ostial and distal LAD were both stented. Returns in follow up for management of CAD. Says he is \"not too bad. \" Continues to have bilateral lower extremity edema and has not noticed any improvement. Unable to walk long distances due to leg fatigue. Has no other cardiac complaints today. Says he and his wife purchased a mobile home and he is currently  working on upgrades. Has been taking all medication as prescribed. Denies any abnormal bruising or bleeding. Specifically denies any chest pain, pressure, tightness, nausea, vomiting, diaphoresis, SOB/FINLEY, palpitations, heart racing, dizziness/lightheadedness, orthopnea, PND or syncope. Past Medical History:   Diagnosis Date    Anesthesia     hallucinations for prostate surgery     Arthritis     Atrial fibrillation ([address]     CAD (coronary artery disease)     Cancer (HCC)     Kidney    DVT (deep venous thrombosis) (HCC)     CORNELIUS legs post prostate surgery 5 yrs ago    Gout     Hx of blood clots     Hypertension      Past Surgical History:   Procedure Laterality Date    BACK SURGERY      3 surgeries total.  Lumbar 3&4,    CARDIAC CATHETERIZATION  2011    CARDIAC SURGERY      stents placed- 1 in 2000 and 2 in 2002.  3 surgeries total    CATARACT REMOVAL Bilateral     CHOLECYSTECTOMY      COLONOSCOPY      HERNIA REPAIR Left      X2.   Left flank and right inguinal    JOINT REPLACEMENT  2009    right knee replacement    KIDNEY SURGERY      tumor removed     NASAL SEPTUM SURGERY      PRESSURE ULCER DEBRIDEMENT      had a stage 4 wound on coccyx 6 years ago    PROSTATE SURGERY      2012-prostate removal due to enlargement (not cancerous)    TUMOR REMOVAL Left     kidney    VENA CAVA FILTER PLACEMENT      5 yrs ago     Social History     Tobacco Use    Smoking status: Former Smoker     Packs/day: 0.50     Years: 6.00     Pack years: 3.00     Types: Cigarettes     Quit date: 1966     Years since quittin.9    Smokeless tobacco: Never Used   Substance Use Topics    Alcohol use: No       Review of Systems:   Constitutional: No fatigue, weakness or significant change in weight. HEENT: No change in vision or ringing in the ears. Respiratory: No FINLEY, PND, orthopnea or cough. Cardiovascular: See HPI    GI: No n/v, abdominal pain or changes in bowel habits. No melena, no hematochezia  : No dysuria or hematuria. Skin: No rash or new skin lesions. Musculoskeletal: No new muscle or joint pain. Neurological: No syncope or TIA-like symptoms. Psychiatric: No anxiety, insomnia or depression    Physical Exam:  There were no vitals filed for this visit. General:  Awake, alert, oriented in NAD. Currently pain free  Skin:  Warm and dry. No unusual bruising or rash  Neck:  Supple. No JVD or carotid bruit appreciated  Chest:  Normal effort. Clear to auscultation, no wheezes/rhonchi/rales  Cardiovascular:  RRR, S1/S2, no murmur/gallop/rub. Chest wall soreness reproducible on palpation and movement  Abdomen:  Soft, nontender, +bowel sounds  Extremities:  No edema  Neurological: No focal deficits  Psychological: Normal mood and affect      Current Outpatient Medications   Medication Sig Dispense Refill    spironolactone (ALDACTONE) 25 MG tablet TAKE 1 TABLET BY MOUTH EVERY DAY 90 tablet 1    chlorthalidone (HYGROTON) 25 MG tablet Take 1/2 tablet daily.  45 tablet 3    valsartan (DIOVAN) 160 MG tablet TAKE 1 TABLET BY MOUTH TWICE DAILY 180 tablet 2    zinc gluconate 50 MG tablet Take 50 mg by mouth daily      pantoprazole (PROTONIX) 40 MG tablet Take 1 tablet by mouth every morning (before breakfast) 90 tablet 5    labetalol (NORMODYNE) 300 MG tablet Take 1 tablet by mouth 2 times daily 180 tablet 3    Ascorbic Acid (VITAMIN C) 250 MG tablet Take 1,000 mg by mouth daily       torsemide (DEMADEX) 20 MG tablet Take 1 tablet by mouth daily as needed (LE edema) 30 tablet 5    aspirin 81 MG chewable tablet Take 1 tablet by mouth daily 30 tablet 3    clopidogrel (PLAVIX) 75 MG tablet TAKE 1 TABLET BY MOUTH EVERY DAY 90 tablet 3    Magnesium Gluconate 27.5 MG TABS Take 500 mg by mouth      b complex vitamins capsule Take 1 capsule by mouth daily      Cholecalciferol (VITAMIN D3) 2000 UNITS CAPS Take 1 capsule by mouth daily       nitroGLYCERIN (NITROSTAT) 0.4 MG SL tablet Place 1 tablet under the tongue every 5 minutes as needed for Chest pain 25 tablet 3     No current facility-administered medications for this visit. Labs:   Lab Results   Component Value Date    WBC 8.4 03/09/2022    HGB 12.1 (L) 03/09/2022    HCT 39.4 03/09/2022    MCV 91 03/09/2022     03/09/2022     Lab Results   Component Value Date     03/09/2022    K 4.2 03/09/2022    K 3.8 01/29/2019     03/09/2022    CO2 29.0 03/09/2022    BUN 29 03/09/2022    CREATININE 1.58 03/09/2022    GLUCOSE 110 03/09/2022    CALCIUM 9.8 03/09/2022    CALCIUM 9.8 03/09/2022      Lab Results   Component Value Date    TRIG 193 03/09/2022    HDL 22 03/09/2022    HDL 34 07/20/2011    LDLCALC 78 03/09/2022    LDLDIRECT 126 11/16/2020    LABVLDL see below 11/16/2020       EKG    06/27/22  NSR, ~78 bpm.           CATH 12/9/16  1. Two-vessel coronary disease with two tandem lesions in the 80-90% range involving the proximal circumflex. 2. An 80% eccentric mid RCA lesion. 3. Normal left ventricular size and systolic function.   4. Normal hemodynamics.     Successful stenting of the proximal circumflex. Two tandem lesions were reduced to 0% using a 3.0-mm x 34-mm Synergy stent. The mid right coronary artery was stented using a 4.0-mm x 16-mm Synergy stent lesion reduced from 80% to 0%. ECHO 12/9/2016   Normal LV size and systolic function: EF is  30%. Grade I diastolic  dysfunction.   Trace mitral regurgitation is present.   The left atrium is normal in size.   Normal right ventricular size and function.   There is trace to mild tricuspid regurgitation with RVSP estimated at 40  mmHg. which is mildly elevated.   Trivial pulmonic regurgitation present.     Coronary Angiogram and PCI: 12/2016  1. Two-vessel coronary disease with two tandem lesions in the 80-90% range involving the proximal circumflex. 2. An 80% eccentric mid RCA lesion. 3. Normal left ventricular size and systolic function. 4. Normal hemodynamics  Successful stenting of the proximal circumflex. Two tandem lesions were reduced to 0% using a 3.0-mm x 34-mm Synergy stent. The mid right coronary artery was stented using a 4.0-mm x 16-mm Synergy stent lesion reduced from 80% to 0%. ECHO: 1/27/2019  Normal left ventricle size and systolic function with an estimated ejection  fraction of 55-60%. No regional wall motion abnormalities are seen. Mild concentric left ventricular hypertrophy. Normal function of all valves. [facility]: 1/28/2019  CORONARY ANGIOGRAM:  1. There is single vessel disease in this right dominant circulation. 2.  Left main:  Free of disease. 3.  LAD in the proximal segment has 80% focal eccentric stenosis. The  first diagonal upper branch has a 95% stenosis. The rest of the vessel  has no major obstructive disease. 4.  Left circumflex artery. The previously stented site is widely  patent. The OM1 is patent as well. 5.  The right coronary artery also is a dominant vessel. The previously  placed stents are widely patent.   There is mild luminal irregularities  in the distal RCA.     CONCLUSION:  1. Single vessel coronary artery disease with 80% proximal LAD and 95%  proximal D1 lesions. 2.  Normal left ventricular size and systolic function. 3.  Normal hemodynamics. TECHNICAL COMMENTS:  The guide engaged the ostium well and provided goodsupport. Direct stenting of the proximal LAD was made. Serial  inflation up to 16 atmospheres which corresponds to a vessel size of  approximately 3.2. Final angiography 0% residual.  Next, the wire was  removed and passed into the diagonal after predilatation. A 2.25 x  12-mm stent was deployed and postdilated to a vessel size of 2.5. Followup angiography shows 0% residual.    CONCLUSION:  1. Successful stenting of the proximal LAD lesion reduced from 80% to 0%. A 3.0 x 15 ISAIAS stent used. 2.  Successful stenting of the first diagonal.  A 2.25 x 12 ISAIAS stent used to a final diameter of 2.5. [facility]: 11/17/20  CORONARY ANGIOGRAPHY FINDINGS:  1. Left main:  Free of disease. 2.  LAD:  Ostium has an 80% lesion. Beyond that, there are two stents  in the proximal and mid LAD which are widely patent. The extreme distal  LAD at the apex has two tandem lesions in the 80% range. 3.  The first diagonal that was stented may have some in-stent stenosis. 4.  The left circumflex has no major obstructive disease. 5.  The ramus has no major obstructive disease. 6.  The right coronary artery has no major obstructive disease. 7.  All the stents that are placed are widely patent.     CONCLUSION:  1. High-grade ostial LAD narrowing in the 80% range; distal LAD has the  two tandem lesions in the 80% range. 2.  Normal left ventricular size and systolic function. 3.  Normal hemodynamics.     Brachial arteriogram was performed and shows widely patent brachial  artery. CONCLUSION:  Successful PCI and stenting of the ostial LAD. A 3.5 x 12  ISAIAS stent was placed. Successful stenting of the distal LAD.   A 2.75 x  15 ISAIAS stent was used. Assessment and Plan:    LE edema  Bilateral   No improvement despite the use of diuretic therapy  Says he is not able to walk long distances due to leg fatigue  Encouraged to keep legs elevated when seated, try to walk as much as possible and continue diuretic     CAD. Presented in different ways each time  10/2006: previous stents to proximal RCA, prox Circumfles and mid LAD  Non obstructive CAD of mid-RCA and Diag 3  12/2016: stenting to mid RCA and proximal circumflex  1/2019: stent to proximal LAD and 1st diagonal   11/17/20: stent to ostial and distal LAD  Continue Aspirin and Plavix     Essential Hypertension. (Over age 62, goal BP <150/90.)  BP is controlled  Continue medical management and low sodium diet     Paroxysmal atrial fibrillation. Today's EKG shows normal rhythm   CHADS-Vasc score 3 (HTN, AGE, CAD). Xarelto stopped due to hematuria; Tried Eliquis 2.5 bid but too expensive in addition to bleeding; hematuria   Also tried Pradaxa; hematuria  Patient will not take Warfarin because of diffficulty in management and admissions to hospital for coagulopathy from time to time. Besides that also causes hematuria. Not a candidate for Watchman given his difficult anatomy. Have discussed in detail risk of stroke. Discussed oral anticoagulation to decrease the risk of thromboembolic events including stroke. Benefits and alternatives were discussed with patient. Risk of bleeding was discussed. Patient verbalized understanding. Tried Xarelto again and patient stopped taking due to recurrence of bleeding. Has since resumed Plavix    Hyperlipidemia goal LDL <70  No statin therapy  Severe intolerance to statins (myalgias)  Started Praluent with some side effects; itching, runny nose, hot flashes  Will have him try Rosuvastatin 5 mg twice weekly; Reviewed lipid panel from 6/10/21; LDL 86.      Acid reflux  Will prescribe Protonix 40 mg daily     Follow up in 6 months     Thank you for allowing me to participate in the care of your patient. Mr. 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   I have asked him to take extra diuretic intermittently he has intermittent chest pain which he thinks is not from the heart because it is nonexertional.  I will try Protonix to see if that helps      Aidan Haywood MD

## 2022-06-30 ENCOUNTER — OFFICE VISIT (OUTPATIENT)
Dept: CARDIOLOGY CLINIC | Age: 82
End: 2022-06-30
Payer: MEDICARE

## 2022-06-30 VITALS
HEIGHT: 70 IN | HEART RATE: 72 BPM | DIASTOLIC BLOOD PRESSURE: 84 MMHG | SYSTOLIC BLOOD PRESSURE: 130 MMHG | BODY MASS INDEX: 42.2 KG/M2 | WEIGHT: 294.8 LBS | OXYGEN SATURATION: 98 %

## 2022-06-30 DIAGNOSIS — I10 PRIMARY HYPERTENSION: Primary | ICD-10-CM

## 2022-06-30 PROCEDURE — 1036F TOBACCO NON-USER: CPT | Performed by: INTERNAL MEDICINE

## 2022-06-30 PROCEDURE — 1123F ACP DISCUSS/DSCN MKR DOCD: CPT | Performed by: INTERNAL MEDICINE

## 2022-06-30 PROCEDURE — 99214 OFFICE O/P EST MOD 30 MIN: CPT | Performed by: INTERNAL MEDICINE

## 2022-06-30 PROCEDURE — 93000 ELECTROCARDIOGRAM COMPLETE: CPT | Performed by: INTERNAL MEDICINE

## 2022-06-30 PROCEDURE — G8427 DOCREV CUR MEDS BY ELIG CLIN: HCPCS | Performed by: INTERNAL MEDICINE

## 2022-06-30 PROCEDURE — G8417 CALC BMI ABV UP PARAM F/U: HCPCS | Performed by: INTERNAL MEDICINE

## 2022-06-30 RX ORDER — ROSUVASTATIN CALCIUM 5 MG/1
5 TABLET, COATED ORAL
Qty: 15 TABLET | Refills: 3 | Status: SHIPPED | OUTPATIENT
Start: 2022-06-30

## 2022-06-30 RX ORDER — SPIRONOLACTONE 25 MG/1
TABLET ORAL
Qty: 90 TABLET | Refills: 3 | Status: SHIPPED | OUTPATIENT
Start: 2022-06-30

## 2022-06-30 RX ORDER — DOXAZOSIN 8 MG/1
4 TABLET ORAL 2 TIMES DAILY
Qty: 45 TABLET | Refills: 3 | Status: SHIPPED | OUTPATIENT
Start: 2022-06-30 | End: 2022-08-08

## 2022-06-30 RX ORDER — CLOPIDOGREL BISULFATE 75 MG/1
TABLET ORAL
Qty: 90 TABLET | Refills: 3 | Status: SHIPPED | OUTPATIENT
Start: 2022-06-30

## 2022-06-30 NOTE — PROGRESS NOTES
[address] [facility] - Cardiology      Chief Complaint: \"I have problems with my legs\"     History of present illness:   Maddison Epperson is a 79 y.o. male with past medical history significant for CAD s/p multiple stents, atrial fibrillation, hypertension, DVT and hyperlipidemia. Diagnosed with severe MILY (8/2022) managed by pulmonology at [facility]. Left kidney CA managed by Dr. Scar Howard. He continued to complain of chest pain and most recent [facility] 11/17/20 showed patent previously placed stents and LAD lesions in the 80%. Ostial and distal LAD were both stented. Returns in follow up for management of CAD. He reports worsening lower extremity swelling with blisters and wounds. He has been following with Dr. Kelton Stone regarding his venous insufficiency. He underwent vein procedure 6 months ago and denies improvement in his symptoms. He took torsemide 4 days last week and did not notice improvement in swelling. He is now using a home lymphapress for the past 2 weeks. He denies chest pain with rest or exertion. His breathing has been comfortable without shortness of breath. He reports ongoing fatigue and tries to use CPAP nightly but reports this does not fit well and he will remove after a couple hours of use. He presented to [facility] several weeks ago due to hypertension and chest pain. His testing was normal at that time. He denies recurrence of chest pain since that time.      Past Medical History:   Diagnosis Date    Anesthesia     hallucinations for prostate surgery     Arthritis     Atrial fibrillation ([address]     CAD (coronary artery disease)     Cancer (HCC)     Kidney    DVT (deep venous thrombosis) ([facility])     CORNELIUS legs post prostate surgery 5 yrs ago    Gout     Hx of blood clots     Hypertension      Past Surgical History:   Procedure Laterality Date    BACK SURGERY      3 surgeries total.  Lumbar 3&4,    CARDIAC CATHETERIZATION  2011    CARDIAC SURGERY      stents placed- 1 in  and 2 in .  3 surgeries total    CATARACT REMOVAL Bilateral     CHOLECYSTECTOMY      COLONOSCOPY      HERNIA REPAIR Left      X2. Left flank and right inguinal    JOINT REPLACEMENT  2009    right knee replacement    KIDNEY SURGERY      tumor removed     NASAL SEPTUM SURGERY      PRESSURE ULCER DEBRIDEMENT      had a stage 4 wound on coccyx 6 years ago    PROSTATE SURGERY      2012-prostate removal due to enlargement (not cancerous)    TUMOR REMOVAL Left     kidney    VENA CAVA FILTER PLACEMENT      5 yrs ago     Social History     Tobacco Use    Smoking status: Former Smoker     Packs/day: 0.50     Years: 6.00     Pack years: 3.00     Types: Cigarettes     Quit date: 1966     Years since quittin.9    Smokeless tobacco: Never Used   Substance Use Topics    Alcohol use: No       Review of Systems:   Constitutional: No fatigue, weakness or significant change in weight. HEENT: No change in vision or ringing in the ears. Respiratory: No FINLEY, PND, orthopnea or cough. Cardiovascular: See HPI    GI: No n/v, abdominal pain or changes in bowel habits. No melena, no hematochezia  : No dysuria or hematuria. Skin: No rash or new skin lesions. Musculoskeletal: No new muscle or joint pain. Neurological: No syncope or TIA-like symptoms. Psychiatric: No anxiety, insomnia or depression    Physical Exam:  Vitals:    22 1324   BP: 130/84   Pulse: 72   SpO2: 98%   Weight: 294 lb 12.8 oz (133.7 kg)   Height: 5' 10\" (1.778 m)       General:  Awake, alert, oriented in NAD. Currently pain free  Skin:  Warm and dry. No unusual bruising or rash  Neck:  Supple. No JVD or carotid bruit appreciated  Chest:  Normal effort. Clear to auscultation, no wheezes/rhonchi/rales  Cardiovascular:  RRR, S1/S2, no murmur/gallop/rub.  Chest wall soreness reproducible on palpation and movement  Abdomen:  Soft, nontender, +bowel sounds  Extremities:  No edema  Neurological: No focal deficits  Psychological: Normal mood and affect      Current Outpatient Medications   Medication Sig Dispense Refill    spironolactone (ALDACTONE) 25 MG tablet TAKE 1 TABLET BY MOUTH EVERY DAY 90 tablet 1    chlorthalidone (HYGROTON) 25 MG tablet Take 1/2 tablet daily. 45 tablet 3    valsartan (DIOVAN) 160 MG tablet TAKE 1 TABLET BY MOUTH TWICE DAILY 180 tablet 2    zinc gluconate 50 MG tablet Take 50 mg by mouth daily      pantoprazole (PROTONIX) 40 MG tablet Take 1 tablet by mouth every morning (before breakfast) 90 tablet 5    labetalol (NORMODYNE) 300 MG tablet Take 1 tablet by mouth 2 times daily 180 tablet 3    Ascorbic Acid (VITAMIN C) 250 MG tablet Take 1,000 mg by mouth daily       torsemide (DEMADEX) 20 MG tablet Take 1 tablet by mouth daily as needed (LE edema) 30 tablet 5    aspirin 81 MG chewable tablet Take 1 tablet by mouth daily 30 tablet 3    clopidogrel (PLAVIX) 75 MG tablet TAKE 1 TABLET BY MOUTH EVERY DAY 90 tablet 3    Magnesium Gluconate 27.5 MG TABS Take 500 mg by mouth      b complex vitamins capsule Take 1 capsule by mouth daily      Cholecalciferol (VITAMIN D3) 2000 UNITS CAPS Take 1 capsule by mouth daily       nitroGLYCERIN (NITROSTAT) 0.4 MG SL tablet Place 1 tablet under the tongue every 5 minutes as needed for Chest pain 25 tablet 3     No current facility-administered medications for this visit.      Labs:   Lab Results   Component Value Date    WBC 8.4 03/09/2022    HGB 12.1 (L) 03/09/2022    HCT 39.4 03/09/2022    MCV 91 03/09/2022     03/09/2022     Lab Results   Component Value Date/Time     03/09/2022 08:59 AM    K 4.2 03/09/2022 08:59 AM    K 3.8 01/29/2019 04:48 AM     03/09/2022 08:59 AM    CO2 29.0 03/09/2022 08:59 AM    BUN 29 03/09/2022 08:59 AM    CREATININE 1.58 03/09/2022 08:59 AM    GLUCOSE 110 03/09/2022 08:59 AM    CALCIUM 9.8 03/09/2022 08:59 AM    CALCIUM 9.8 03/09/2022 08:59 AM      Lab Results   Component Value Date/Time    TRIG 193 03/09/2022 08:59 AM    HDL 22 03/09/2022 08:59 AM    HDL 34 07/20/2011 03:34 PM    LDLCALC 78 03/09/2022 08:59 AM    LDLDIRECT 126 11/16/2020 04:02 PM    LABVLDL see below 11/16/2020 04:02 PM       EKG    06/30/22  NSR, ~78 bpm.           CATH 12/9/16  1. Two-vessel coronary disease with two tandem lesions in the 80-90% range involving the proximal circumflex. 2. An 80% eccentric mid RCA lesion. 3. Normal left ventricular size and systolic function. 4. Normal hemodynamics.     Successful stenting of the proximal circumflex. Two tandem lesions were reduced to 0% using a 3.0-mm x 34-mm Synergy stent. The mid right coronary artery was stented using a 4.0-mm x 16-mm Synergy stent lesion reduced from 80% to 0%. ECHO 12/9/2016   Normal LV size and systolic function: EF is  06%. Grade I diastolic  dysfunction.   Trace mitral regurgitation is present.   The left atrium is normal in size.   Normal right ventricular size and function.   There is trace to mild tricuspid regurgitation with RVSP estimated at 40  mmHg. which is mildly elevated.   Trivial pulmonic regurgitation present.     Coronary Angiogram and PCI: 12/2016  1. Two-vessel coronary disease with two tandem lesions in the 80-90% range involving the proximal circumflex. 2. An 80% eccentric mid RCA lesion. 3. Normal left ventricular size and systolic function. 4. Normal hemodynamics  Successful stenting of the proximal circumflex. Two tandem lesions were reduced to 0% using a 3.0-mm x 34-mm Synergy stent. The mid right coronary artery was stented using a 4.0-mm x 16-mm Synergy stent lesion reduced from 80% to 0%. ECHO: 1/27/2019  Normal left ventricle size and systolic function with an estimated ejection  fraction of 55-60%. No regional wall motion abnormalities are seen. Mild concentric left ventricular hypertrophy. Normal function of all valves. [facility]: 1/28/2019  CORONARY ANGIOGRAM:  1.   There is single vessel disease in this right dominant circulation. 2.  Left main:  Free of disease. 3.  LAD in the proximal segment has 80% focal eccentric stenosis. The  first diagonal upper branch has a 95% stenosis. The rest of the vessel  has no major obstructive disease. 4.  Left circumflex artery. The previously stented site is widely  patent. The OM1 is patent as well. 5.  The right coronary artery also is a dominant vessel. The previously  placed stents are widely patent. There is mild luminal irregularities  in the distal RCA.     CONCLUSION:  1. Single vessel coronary artery disease with 80% proximal LAD and 95%  proximal D1 lesions. 2.  Normal left ventricular size and systolic function. 3.  Normal hemodynamics. TECHNICAL COMMENTS:  The guide engaged the ostium well and provided goodsupport. Direct stenting of the proximal LAD was made. Serial  inflation up to 16 atmospheres which corresponds to a vessel size of  approximately 3.2. Final angiography 0% residual.  Next, the wire was  removed and passed into the diagonal after predilatation. A 2.25 x  12-mm stent was deployed and postdilated to a vessel size of 2.5. Followup angiography shows 0% residual.    CONCLUSION:  1. Successful stenting of the proximal LAD lesion reduced from 80% to 0%. A 3.0 x 15 ISAIAS stent used. 2.  Successful stenting of the first diagonal.  A 2.25 x 12 ISAIAS stent used to a final diameter of 2.5. [facility]: 11/17/20  CORONARY ANGIOGRAPHY FINDINGS:  1. Left main:  Free of disease. 2.  LAD:  Ostium has an 80% lesion. Beyond that, there are two stents  in the proximal and mid LAD which are widely patent. The extreme distal  LAD at the apex has two tandem lesions in the 80% range. 3.  The first diagonal that was stented may have some in-stent stenosis. 4.  The left circumflex has no major obstructive disease. 5.  The ramus has no major obstructive disease. 6.  The right coronary artery has no major obstructive disease.   7.  All the stents that are placed are widely patent.     CONCLUSION:  1. High-grade ostial LAD narrowing in the 80% range; distal LAD has the  two tandem lesions in the 80% range. 2.  Normal left ventricular size and systolic function. 3.  Normal hemodynamics.     Brachial arteriogram was performed and shows widely patent brachial  artery. CONCLUSION:  Successful PCI and stenting of the ostial LAD. A 3.5 x 12  ISAIAS stent was placed. Successful stenting of the distal LAD. A 2.75 x  15 ISAIAS stent was used. Assessment and Plan:    Fatigue  main complaint is extreme fatigue. While this could be secondary to sleep apnea, high-dose labetalol may be contributing to it as well. I would like him to switch to doxazosin which we will start at 4 mg twice daily and advance to 8 mg twice daily for blood pressure control I have cautioned him about dizziness when he stands up. I am trying to uptitrate this medicine gradually. He will hold his labetalol while the doxazosin is being tried    LE edema  Bilateral   No improvement despite the use of diuretic therapy  Following with Dr. Kylah Driscoll for venous insufficiency   Encouraged to use torsemide PRN for swelling     CAD. Presented in different ways each time  10/2006: previous stents to proximal RCA, prox Circumfles and mid LAD  Non obstructive CAD of mid-RCA and Diag 3  12/2016: stenting to mid RCA and proximal circumflex  1/2019: stent to proximal LAD and 1st diagonal   11/17/20: stent to ostial and distal LAD  Continue Aspirin and Plavix     Essential Hypertension. (Over age 62, goal BP <150/90.)  BP is controlled  Complains of fatigue and will have him discontinue labetalol   Begin doxazosin 4 mg BID       Paroxysmal atrial fibrillation. Today's EKG shows normal rhythm   CHADS-Vasc score 3 (HTN, AGE, CAD).    Xarelto stopped due to hematuria; Tried Eliquis 2.5 bid but too expensive in addition to bleeding; hematuria   Also tried Pradaxa; hematuria  Patient will not take Warfarin because of diffficulty in management and admissions to hospital for coagulopathy from time to time. Besides that also causes hematuria. Not a candidate for Watchman given his difficult anatomy. Have discussed in detail risk of stroke. Discussed oral anticoagulation to decrease the risk of thromboembolic events including stroke. Benefits and alternatives were discussed with patient. Risk of bleeding was discussed. Patient verbalized understanding. Tried Xarelto again and patient stopped taking due to recurrence of bleeding. Has since resumed Plavix    Hyperlipidemia goal LDL <70  No statin therapy  Severe intolerance to statins (myalgias)  Started Praluent with some side effects; itching, runny nose, hot flashes  Will have him try Rosuvastatin 5 mg twice weekly; Reviewed lipid panel from 3/9/22; LDL 78. Doxazosin 8mg 1/2 tablet twice daily. Acid reflux  Will prescribe Protonix 40 mg daily     Follow up in 3 months     Thank you for allowing me to participate in the care of your patient. Tamica Austin MD     This note was scribed in the presence of Tamica Austin MD by Win Guerra RN. Physician Attestation:  The scribes documentation has been prepared under my direction and personally reviewed by me in its entirety. I confirm that the note above accurately reflects all work, treatment, procedures, and medical decision making performed by me.

## 2022-06-30 NOTE — PATIENT INSTRUCTIONS
Patient Education        Heart-Healthy Diet: Care Instructions  Your Care Instructions     A heart-healthy diet has lots of vegetables, fruits, nuts, beans, and whole grains, and is low in salt. It limits foods that are high in saturated fat, such as meats, cheeses, and fried foods. It may be hard to change your diet,but even small changes can lower your risk of heart attack and heart disease. Follow-up care is a key part of your treatment and safety. Be sure to make and go to all appointments, and call your doctor if you are having problems. It's also a good idea to know your test results and keep alist of the medicines you take. How can you care for yourself at home? Watch your portions   Use food labels to learn what the recommended servings are for the foods you eat.  Eat only the number of calories you need to stay at a healthy weight. If you need to lose weight, eat fewer calories than your body burns (through exercise and other physical activity). Eat more fruits and vegetables   Eat a variety of fruit and vegetables every day. Dark green, deep orange, red, or yellow fruits and vegetables are especially good for you. Examples include spinach, carrots, peaches, and berries.  Keep carrots, celery, and other veggies handy for snacks. Buy fruit that is in season and store it where you can see it so that you will be tempted to eat it.  Cook dishes that have a lot of veggies in them, such as stir-fries and soups. Limit saturated fat   Read food labels, and try to avoid saturated fats. They increase your risk of heart disease.  Use olive or canola oil when you cook.  Bake, broil, grill, or steam foods instead of frying them.  Choose lean meats instead of high-fat meats such as hot dogs and sausages. Cut off all visible fat when you prepare meat.  Eat fish, skinless poultry, and meat alternatives such as soy products instead of high-fat meats.  Soy products, such as tofu, may be especially good for your heart.  Choose low-fat or fat-free milk and dairy products. Eat foods high in fiber   Eat a variety of grain products every day. Include whole-grain foods that have lots of fiber and nutrients. Examples of whole-grain foods include oats, whole wheat bread, and brown rice.  Buy whole-grain breads and cereals, instead of white bread or pastries. Limit salt and sodium   Limit how much salt and sodium you eat to help lower your blood pressure.  Taste food before you salt it. Add only a little salt when you think you need it. With time, your taste buds will adjust to less salt.  Eat fewer snack items, fast foods, and other high-salt, processed foods. Check food labels for the amount of sodium in packaged foods.  Choose low-sodium versions of canned goods (such as soups, vegetables, and beans). Limit sugar   Limit drinks and foods with added sugar. These include candy, desserts, and soda pop. Limit alcohol   Limit alcohol to no more than 2 drinks a day for men and 1 drink a day for women. Too much alcohol can cause health problems. When should you call for help? Watch closely for changes in your health, and be sure to contact your doctor if:     You would like help planning heart-healthy meals. Where can you learn more? Go to [email or web address] org and sign in to your [employer] account. Enter V137 in the [facility] box to learn more about \"Heart-Healthy Diet: Care Instructions. \"     If you do not have an account, please click on the \"Sign Up Now\" link. Current as of: September 8, 2021               Content Version: 13.3  © 0616-8275 Healthwise, [employer]. Care instructions adapted under license by [facility] ([facility]). If you have questions about a medical condition or this instruction, always ask your healthcare professional. [place] [address] any warranty or liability for your use of this information.

## 2022-08-08 RX ORDER — DOXAZOSIN 8 MG/1
4 TABLET ORAL 2 TIMES DAILY
Qty: 90 TABLET | Refills: 3 | Status: SHIPPED | OUTPATIENT
Start: 2022-08-08 | End: 2022-10-12 | Stop reason: SINTOL

## 2022-10-11 NOTE — PROGRESS NOTES
[address] [facility] - Cardiology      Chief Complaint: \"    History of present illness:   Orlin Lawrence is a 79 y.o. male with past medical history significant for CAD s/p multiple stents, atrial fibrillation, hypertension, DVT and hyperlipidemia. Diagnosed with severe MILY (8/2022) managed by pulmonology at [facility]. Left kidney CA managed by Dr. Devora Medina. He continued to complain of chest pain and most recent [facility] 11/17/20 showed patent previously placed stents and LAD lesions in the 80%. Ostial and distal LAD were both stented. Returns in follow up for management of CAD. He reports worsening lower extremity swelling with blisters and wounds. He has been following with Dr. Zaina Dickerson regarding his venous insufficiency. He underwent vein procedure 6 months ago and denies improvement in his symptoms. He took torsemide 4 days last week and did not notice improvement in swelling. He is now using a home lymphapress for the past 2 weeks. He denies chest pain with rest or exertion. His breathing has been comfortable without shortness of breath. He reports ongoing fatigue and tries to use CPAP nightly but reports this does not fit well and he will remove after a couple hours of use. He presented to [facility] several weeks ago due to hypertension and chest pain. His testing was normal at that time. He denies recurrence of chest pain since that time. Past Medical History:   Diagnosis Date    Anesthesia     hallucinations for prostate surgery     Arthritis     Atrial fibrillation ([address]     CAD (coronary artery disease)     Cancer ([address]     Kidney    DVT (deep venous thrombosis) (HCC)     CORNELIUS legs post prostate surgery 5 yrs ago    Gout     Hx of blood clots     Hypertension      Past Surgical History:   Procedure Laterality Date    BACK SURGERY      3 surgeries total.  Lumbar 3&4,    CARDIAC CATHETERIZATION  2011    CARDIAC SURGERY      stents placed- 1 in 2000 and 2 in 2002.   3 surgeries total    CATARACT REMOVAL Bilateral     CHOLECYSTECTOMY      COLONOSCOPY      HERNIA REPAIR Left      X2. Left flank and right inguinal    JOINT REPLACEMENT  2009    right knee replacement    KIDNEY SURGERY      tumor removed     NASAL SEPTUM SURGERY      PRESSURE ULCER DEBRIDEMENT      had a stage 4 wound on coccyx 6 years ago    PROSTATE SURGERY      2012-prostate removal due to enlargement (not cancerous)    TUMOR REMOVAL Left     kidney    VENA CAVA FILTER PLACEMENT      5 yrs ago     Social History     Tobacco Use    Smoking status: Former     Packs/day: 0.50     Years: 6.00     Pack years: 3.00     Types: Cigarettes     Quit date: 1966     Years since quittin.2    Smokeless tobacco: Never   Substance Use Topics    Alcohol use: No       Review of Systems:   Constitutional: No fatigue, weakness or significant change in weight. HEENT: No change in vision or ringing in the ears. Respiratory: No FINLEY, PND, orthopnea or cough. Cardiovascular: See HPI    GI: No n/v, abdominal pain or changes in bowel habits. No melena, no hematochezia  : No dysuria or hematuria. Skin: No rash or new skin lesions. Musculoskeletal: No new muscle or joint pain. Neurological: No syncope or TIA-like symptoms. Psychiatric: No anxiety, insomnia or depression    Physical Exam:  There were no vitals filed for this visit. General:  Awake, alert, oriented in NAD. Currently pain free  Skin:  Warm and dry. No unusual bruising or rash  Neck:  Supple. No JVD or carotid bruit appreciated  Chest:  Normal effort. Clear to auscultation, no wheezes/rhonchi/rales  Cardiovascular:  RRR, S1/S2, no murmur/gallop/rub.  Chest wall soreness reproducible on palpation and movement  Abdomen:  Soft, nontender, +bowel sounds  Extremities:  No edema  Neurological: No focal deficits  Psychological: Normal mood and affect      Current Outpatient Medications   Medication Sig Dispense Refill    doxazosin (CARDURA) 8 MG tablet TAKE 0. 5 TABLETS BY MOUTH IN THE MORNING AND AT BEDTIME 90 tablet 3    clopidogrel (PLAVIX) 75 MG tablet TAKE 1 TABLET BY MOUTH EVERY DAY 90 tablet 3    spironolactone (ALDACTONE) 25 MG tablet TAKE 1 TABLET BY MOUTH EVERY DAY 90 tablet 3    rosuvastatin (CRESTOR) 5 MG tablet Take 1 tablet by mouth Twice a Week 15 tablet 3    chlorthalidone (HYGROTON) 25 MG tablet Take 1/2 tablet daily. 45 tablet 3    valsartan (DIOVAN) 160 MG tablet TAKE 1 TABLET BY MOUTH TWICE DAILY 180 tablet 2    zinc gluconate 50 MG tablet Take 50 mg by mouth daily      pantoprazole (PROTONIX) 40 MG tablet Take 1 tablet by mouth every morning (before breakfast) 90 tablet 5    labetalol (NORMODYNE) 300 MG tablet Take 1 tablet by mouth 2 times daily 180 tablet 3    Ascorbic Acid (VITAMIN C) 250 MG tablet Take 1,000 mg by mouth daily       torsemide (DEMADEX) 20 MG tablet Take 1 tablet by mouth daily as needed (LE edema) 30 tablet 5    aspirin 81 MG chewable tablet Take 1 tablet by mouth daily 30 tablet 3    Magnesium Gluconate 27.5 MG TABS Take 500 mg by mouth      b complex vitamins capsule Take 1 capsule by mouth daily      Cholecalciferol (VITAMIN D3) 2000 UNITS CAPS Take 1 capsule by mouth daily       nitroGLYCERIN (NITROSTAT) 0.4 MG SL tablet Place 1 tablet under the tongue every 5 minutes as needed for Chest pain 25 tablet 3     No current facility-administered medications for this visit.      Labs:   Lab Results   Component Value Date    WBC 8.4 03/09/2022    HGB 12.1 (L) 03/09/2022    HCT 39.4 03/09/2022    MCV 91 03/09/2022     03/09/2022     Lab Results   Component Value Date/Time     03/09/2022 08:59 AM    K 4.2 03/09/2022 08:59 AM    K 3.8 01/29/2019 04:48 AM     03/09/2022 08:59 AM    CO2 29.0 03/09/2022 08:59 AM    BUN 29 03/09/2022 08:59 AM    CREATININE 1.58 03/09/2022 08:59 AM    GLUCOSE 110 03/09/2022 08:59 AM    CALCIUM 9.8 03/09/2022 08:59 AM    CALCIUM 9.8 03/09/2022 08:59 AM      Lab Results   Component Value Date/Time    TRIG 193 03/09/2022 08:59 AM    HDL 22 03/09/2022 08:59 AM    HDL 34 07/20/2011 03:34 PM    LDLCALC 78 03/09/2022 08:59 AM    LDLDIRECT 126 11/16/2020 04:02 PM    LABVLDL see below 11/16/2020 04:02 PM       EKG    10/11/22  NSR, ~78 bpm.           CATH 12/9/16  1. Two-vessel coronary disease with two tandem lesions in the 80-90% range involving the proximal circumflex. 2. An 80% eccentric mid RCA lesion. 3. Normal left ventricular size and systolic function. 4. Normal hemodynamics. Successful stenting of the proximal circumflex. Two tandem lesions were reduced to 0% using a 3.0-mm x 34-mm Synergy stent. The mid right coronary artery was stented using a 4.0-mm x 16-mm Synergy stent lesion reduced from 80% to 0%. ECHO 12/9/2016   Normal LV size and systolic function: EF is  60%. Grade I diastolic  dysfunction. Trace mitral regurgitation is present. The left atrium is normal in size. Normal right ventricular size and function. There is trace to mild tricuspid regurgitation with RVSP estimated at 40  mmHg. which is mildly elevated. Trivial pulmonic regurgitation present. Coronary Angiogram and PCI: 12/2016  1. Two-vessel coronary disease with two tandem lesions in the 80-90% range involving the proximal circumflex. 2. An 80% eccentric mid RCA lesion. 3. Normal left ventricular size and systolic function. 4. Normal hemodynamics  Successful stenting of the proximal circumflex. Two tandem lesions were reduced to 0% using a 3.0-mm x 34-mm Synergy stent. The mid right coronary artery was stented using a 4.0-mm x 16-mm Synergy stent lesion reduced from 80% to 0%. ECHO: 1/27/2019  Normal left ventricle size and systolic function with an estimated ejection  fraction of 55-60%. No regional wall motion abnormalities are seen. Mild concentric left ventricular hypertrophy. Normal function of all valves. [facility]: 1/28/2019  CORONARY ANGIOGRAM:  1.   There is single vessel disease in this right dominant circulation. 2.  Left main:  Free of disease. 3.  LAD in the proximal segment has 80% focal eccentric stenosis. The  first diagonal upper branch has a 95% stenosis. The rest of the vessel  has no major obstructive disease. 4.  Left circumflex artery. The previously stented site is widely  patent. The OM1 is patent as well. 5.  The right coronary artery also is a dominant vessel. The previously  placed stents are widely patent. There is mild luminal irregularities  in the distal RCA. CONCLUSION:  1. Single vessel coronary artery disease with 80% proximal LAD and 95%  proximal D1 lesions. 2.  Normal left ventricular size and systolic function. 3.  Normal hemodynamics. TECHNICAL COMMENTS:  The guide engaged the ostium well and provided goodsupport. Direct stenting of the proximal LAD was made. Serial  inflation up to 16 atmospheres which corresponds to a vessel size of  approximately 3.2. Final angiography 0% residual.  Next, the wire was  removed and passed into the diagonal after predilatation. A 2.25 x  12-mm stent was deployed and postdilated to a vessel size of 2.5. Followup angiography shows 0% residual.    CONCLUSION:  1. Successful stenting of the proximal LAD lesion reduced from 80% to 0%. A 3.0 x 15 ISAIAS stent used. 2.  Successful stenting of the first diagonal.  A 2.25 x 12 ISAIAS stent used to a final diameter of 2.5. [facility]: 11/17/20  CORONARY ANGIOGRAPHY FINDINGS:  1. Left main:  Free of disease. 2.  LAD:  Ostium has an 80% lesion. Beyond that, there are two stents  in the proximal and mid LAD which are widely patent. The extreme distal  LAD at the apex has two tandem lesions in the 80% range. 3.  The first diagonal that was stented may have some in-stent stenosis. 4.  The left circumflex has no major obstructive disease. 5.  The ramus has no major obstructive disease. 6.  The right coronary artery has no major obstructive disease.   7.  All the stents that are placed are widely patent. CONCLUSION:  1. High-grade ostial LAD narrowing in the 80% range; distal LAD has the  two tandem lesions in the 80% range. 2.  Normal left ventricular size and systolic function. 3.  Normal hemodynamics. Brachial arteriogram was performed and shows widely patent brachial  artery. CONCLUSION:  Successful PCI and stenting of the ostial LAD. A 3.5 x 12  ISAIAS stent was placed. Successful stenting of the distal LAD. A 2.75 x  15 ISAIAS stent was used. Assessment and Plan:    Fatigue  main complaint is extreme fatigue. While this could be secondary to sleep apnea, high-dose labetalol may be contributing to it as well. I would like him to switch to doxazosin which we will start at 4 mg twice daily and advance to 8 mg twice daily for blood pressure control I have cautioned him about dizziness when he stands up. I am trying to uptitrate this medicine gradually. He will hold his labetalol while the doxazosin is being tried    LE edema  Bilateral   No improvement despite the use of diuretic therapy  Following with Dr. Ann Caceres for venous insufficiency   Encouraged to use torsemide PRN for swelling     CAD. Presented in different ways each time  10/2006: previous stents to proximal RCA, prox Circumfles and mid LAD  Non obstructive CAD of mid-RCA and Diag 3  12/2016: stenting to mid RCA and proximal circumflex  1/2019: stent to proximal LAD and 1st diagonal   11/17/20: stent to ostial and distal LAD  Continue Aspirin and Plavix     Essential Hypertension. (Over age 62, goal BP <150/90.)  BP is controlled  Complains of fatigue and will have him discontinue labetalol   Begin doxazosin 4 mg BID     Paroxysmal atrial fibrillation. Today's EKG shows normal rhythm   CHADS-Vasc score 3 (HTN, AGE, CAD).    Xarelto stopped due to hematuria; Tried Eliquis 2.5 bid but too expensive in addition to bleeding; hematuria   Also tried Pradaxa; hematuria  Patient will not take Warfarin because of diffficulty in management and admissions to hospital for coagulopathy from time to time. Besides that also causes hematuria. Not a candidate for Watchman given his difficult anatomy. Have discussed in detail risk of stroke. Discussed oral anticoagulation to decrease the risk of thromboembolic events including stroke. Benefits and alternatives were discussed with patient. Risk of bleeding was discussed. Patient verbalized understanding. Tried Xarelto again and patient stopped taking due to recurrence of bleeding. Has since resumed Plavix    Hyperlipidemia goal LDL <70  No statin therapy  Severe intolerance to statins (myalgias)  Started Praluent with some side effects; itching, runny nose, hot flashes  Will have him try Rosuvastatin 5 mg twice weekly; Reviewed lipid panel from 3/9/22; LDL 78. Doxazosin 8mg 1/2 tablet twice daily. Acid reflux  Will prescribe Protonix 40 mg daily     Follow up in 3 months     Thank you for allowing me to participate in the care of your patient.         Hermes Al MD

## 2022-10-12 ENCOUNTER — OFFICE VISIT (OUTPATIENT)
Dept: CARDIOLOGY CLINIC | Age: 82
End: 2022-10-12
Payer: MEDICARE

## 2022-10-12 VITALS
HEART RATE: 83 BPM | WEIGHT: 295 LBS | OXYGEN SATURATION: 92 % | SYSTOLIC BLOOD PRESSURE: 146 MMHG | DIASTOLIC BLOOD PRESSURE: 80 MMHG | BODY MASS INDEX: 42.23 KG/M2 | HEIGHT: 70 IN

## 2022-10-12 DIAGNOSIS — R53.83 OTHER FATIGUE: Primary | ICD-10-CM

## 2022-10-12 DIAGNOSIS — I48.0 PAF (PAROXYSMAL ATRIAL FIBRILLATION) (HCC): ICD-10-CM

## 2022-10-12 DIAGNOSIS — I25.10 CAD IN NATIVE ARTERY: ICD-10-CM

## 2022-10-12 DIAGNOSIS — R60.0 BILATERAL LEG EDEMA: ICD-10-CM

## 2022-10-12 DIAGNOSIS — E78.5 HYPERLIPIDEMIA LDL GOAL <70: ICD-10-CM

## 2022-10-12 DIAGNOSIS — K21.9 MILD ACID REFLUX: ICD-10-CM

## 2022-10-12 DIAGNOSIS — I10 ESSENTIAL HYPERTENSION: ICD-10-CM

## 2022-10-12 LAB
ALBUMIN SERPL-MCNC: 4.5 G/DL (ref 3.4–5)
ALP BLD-CCNC: 68 U/L (ref 40–129)
ALT SERPL-CCNC: 17 U/L (ref 10–40)
AST SERPL-CCNC: 15 U/L (ref 15–37)
BILIRUB SERPL-MCNC: 0.7 MG/DL (ref 0–1)
BILIRUBIN DIRECT: <0.2 MG/DL (ref 0–0.3)
BILIRUBIN, INDIRECT: NORMAL MG/DL (ref 0–1)
CHOLESTEROL, FASTING: 129 MG/DL (ref 0–199)
HDLC SERPL-MCNC: 31 MG/DL (ref 40–60)
LDL CHOLESTEROL CALCULATED: 69 MG/DL
TOTAL PROTEIN: 6.7 G/DL (ref 6.4–8.2)
TRIGLYCERIDE, FASTING: 143 MG/DL (ref 0–150)
VLDLC SERPL CALC-MCNC: 29 MG/DL

## 2022-10-12 PROCEDURE — G8484 FLU IMMUNIZE NO ADMIN: HCPCS | Performed by: INTERNAL MEDICINE

## 2022-10-12 PROCEDURE — G8417 CALC BMI ABV UP PARAM F/U: HCPCS | Performed by: INTERNAL MEDICINE

## 2022-10-12 PROCEDURE — 1036F TOBACCO NON-USER: CPT | Performed by: INTERNAL MEDICINE

## 2022-10-12 PROCEDURE — 1123F ACP DISCUSS/DSCN MKR DOCD: CPT | Performed by: INTERNAL MEDICINE

## 2022-10-12 PROCEDURE — G8427 DOCREV CUR MEDS BY ELIG CLIN: HCPCS | Performed by: INTERNAL MEDICINE

## 2022-10-12 PROCEDURE — 99214 OFFICE O/P EST MOD 30 MIN: CPT | Performed by: INTERNAL MEDICINE

## 2022-10-12 RX ORDER — MAGNESIUM OXIDE 400 MG/1
400 TABLET ORAL 2 TIMES DAILY
Qty: 60 TABLET | Refills: 1 | COMMUNITY
Start: 2022-10-12

## 2022-10-12 RX ORDER — FUROSEMIDE 40 MG/1
40 TABLET ORAL 2 TIMES DAILY
COMMUNITY

## 2022-10-12 RX ORDER — VALSARTAN 160 MG/1
TABLET ORAL
Qty: 180 TABLET | Refills: 2 | Status: SHIPPED | OUTPATIENT
Start: 2022-10-12

## 2022-10-12 NOTE — PROGRESS NOTES
[facility] - Cardiology      Chief Complaint: \" I have fatigue and shortness of breath. \"     History of present illness:   Titus Arvizu is a 81 y.o. male with past medical history significant for venous insufficiency (Dr. Loulou Humphrey) s/p vein procedure, CAD s/p multiple stents, atrial fibrillation, hypertension, DVT and hyperlipidemia. Diagnosed with severe MILY (8/2022) managed by pulmonology at [facility]. Left kidney CA managed by Dr. Ventura Cortez. He continued to complain of chest pain and most recent [facility] 11/17/20 showed patent previously placed stents and LAD lesions in the 80%. Ostial and distal LAD were both stented. Returns in follow up for management of CAD. States he has been using pneumatic compression device at home while lying in hospital bed. He notices improvement in his bilateral leg swelling after use, however the swelling returns. Complains of frequent urination with torsemide and now taking Lasix. Has cut dose of Lasix down to 40 mg every other day. He self discontinued Cardura after 1 dose. Believes the medication caused an elevated heart rate of 125 bpm. States he has had no recurrence of tachycardia since stopping Cardura. States he also has difficulty walking long distances as he develops leg fatigue and occasional shortness of breath.      Past Medical History:   Diagnosis Date    Anesthesia     hallucinations for prostate surgery     Arthritis     Atrial fibrillation ([address]     CAD (coronary artery disease)     Cancer (HCC)     Kidney    DVT (deep venous thrombosis) ([facility])     CORNELIUS legs post prostate surgery 5 yrs ago    Gout     Hx of blood clots     Hypertension      Past Surgical History:   Procedure Laterality Date    BACK SURGERY      3 surgeries total.  Lumbar 3&4,    CARDIAC CATHETERIZATION  2011    CARDIAC SURGERY      stents placed- 1 in 2000 and 2 in 2002.  3 surgeries total    CATARACT REMOVAL Bilateral     CHOLECYSTECTOMY COLONOSCOPY      HERNIA REPAIR Left      X2. Left flank and right inguinal    JOINT REPLACEMENT  2009    right knee replacement    KIDNEY SURGERY      tumor removed     NASAL SEPTUM SURGERY      PRESSURE ULCER DEBRIDEMENT      had a stage 4 wound on coccyx 6 years ago    PROSTATE SURGERY      2012-prostate removal due to enlargement (not cancerous)    TUMOR REMOVAL Left     kidney    VENA CAVA FILTER PLACEMENT      5 yrs ago     Social History     Tobacco Use    Smoking status: Former     Packs/day: 0.50     Years: 6.00     Pack years: 3.00     Types: Cigarettes     Quit date: 1966     Years since quittin.2    Smokeless tobacco: Never   Substance Use Topics    Alcohol use: No       Review of Systems:   Constitutional: No fatigue, weakness or significant change in weight. HEENT: No change in vision or ringing in the ears. Respiratory: No FINLEY, PND, orthopnea or cough. Cardiovascular: See HPI    GI: No n/v, abdominal pain or changes in bowel habits. No melena, no hematochezia  : No dysuria or hematuria. Skin: No rash or new skin lesions. Musculoskeletal: No new muscle or joint pain. Neurological: No syncope or TIA-like symptoms. Psychiatric: No anxiety, insomnia or depression    Physical Exam:  Vitals:    10/12/22 1304 10/12/22 1308   BP: (!) 148/82 (!) 146/80   Site: Left Upper Arm Left Upper Arm   Position: Sitting Sitting   Pulse: 83    SpO2: 92%    Weight: 295 lb (133.8 kg)    Height: 5' 10\" (1.778 m)          General:  Awake, alert, oriented in NAD. Currently pain free  Skin:  Warm and dry. No unusual bruising or rash  Neck:  Supple. No JVD or carotid bruit appreciated  Chest:  Normal effort. Clear to auscultation, no wheezes/rhonchi/rales  Cardiovascular:  RRR, S1/S2, no murmur/gallop/rub.  Chest wall soreness reproducible on palpation and movement  Abdomen:  Soft, nontender, +bowel sounds  Extremities:  No edema  Neurological: No focal deficits  Psychological: Normal mood and affect      Current Outpatient Medications   Medication Sig Dispense Refill    furosemide (LASIX) 40 MG tablet Take 40 mg by mouth in the morning and 40 mg in the evening.      magnesium oxide (MAG-OX) 400 MG tablet Take 1 tablet by mouth 2 times daily 60 tablet 1    clopidogrel (PLAVIX) 75 MG tablet TAKE 1 TABLET BY MOUTH EVERY DAY 90 tablet 3    spironolactone (ALDACTONE) 25 MG tablet TAKE 1 TABLET BY MOUTH EVERY DAY 90 tablet 3    rosuvastatin (CRESTOR) 5 MG tablet Take 1 tablet by mouth Twice a Week 15 tablet 3    chlorthalidone (HYGROTON) 25 MG tablet Take 1/2 tablet daily. 45 tablet 3    valsartan (DIOVAN) 160 MG tablet TAKE 1 TABLET BY MOUTH TWICE DAILY 180 tablet 2    zinc gluconate 50 MG tablet Take 50 mg by mouth daily      pantoprazole (PROTONIX) 40 MG tablet Take 1 tablet by mouth every morning (before breakfast) 90 tablet 5    labetalol (NORMODYNE) 300 MG tablet Take 1 tablet by mouth 2 times daily 180 tablet 3    Ascorbic Acid (VITAMIN C) 250 MG tablet Take 1,000 mg by mouth daily       aspirin 81 MG chewable tablet Take 1 tablet by mouth daily 30 tablet 3    b complex vitamins capsule Take 1 capsule by mouth daily      Cholecalciferol (VITAMIN D3) 2000 UNITS CAPS Take 1 capsule by mouth daily       nitroGLYCERIN (NITROSTAT) 0.4 MG SL tablet Place 1 tablet under the tongue every 5 minutes as needed for Chest pain 25 tablet 3     No current facility-administered medications for this visit.      Labs:   Lab Results   Component Value Date    WBC 8.4 03/09/2022    HGB 12.1 (L) 03/09/2022    HCT 39.4 03/09/2022    MCV 91 03/09/2022     03/09/2022     Lab Results   Component Value Date/Time     03/09/2022 08:59 AM    K 4.2 03/09/2022 08:59 AM    K 3.8 01/29/2019 04:48 AM     03/09/2022 08:59 AM    CO2 29.0 03/09/2022 08:59 AM    BUN 29 03/09/2022 08:59 AM    CREATININE 1.58 03/09/2022 08:59 AM    GLUCOSE 110 03/09/2022 08:59 AM    CALCIUM 9.8 03/09/2022 08:59 AM    CALCIUM 9.8 03/09/2022 08:59 AM      Lab Results   Component Value Date/Time    TRIG 193 03/09/2022 08:59 AM    HDL 22 03/09/2022 08:59 AM    HDL 34 07/20/2011 03:34 PM    LDLCALC 78 03/09/2022 08:59 AM    LDLDIRECT 126 11/16/2020 04:02 PM    LABVLDL see below 11/16/2020 04:02 PM       EKG          CATH 12/9/16  1. Two-vessel coronary disease with two tandem lesions in the 80-90% range involving the proximal circumflex. 2. An 80% eccentric mid RCA lesion. 3. Normal left ventricular size and systolic function. 4. Normal hemodynamics. Successful stenting of the proximal circumflex. Two tandem lesions were reduced to 0% using a 3.0-mm x 34-mm Synergy stent. The mid right coronary artery was stented using a 4.0-mm x 16-mm Synergy stent lesion reduced from 80% to 0%. ECHO 12/9/2016   Normal LV size and systolic function: EF is  60%. Grade I diastolic  dysfunction. Trace mitral regurgitation is present. The left atrium is normal in size. Normal right ventricular size and function. There is trace to mild tricuspid regurgitation with RVSP estimated at 40  mmHg. which is mildly elevated. Trivial pulmonic regurgitation present. Coronary Angiogram and PCI: 12/2016  1. Two-vessel coronary disease with two tandem lesions in the 80-90% range involving the proximal circumflex. 2. An 80% eccentric mid RCA lesion. 3. Normal left ventricular size and systolic function. 4. Normal hemodynamics  Successful stenting of the proximal circumflex. Two tandem lesions were reduced to 0% using a 3.0-mm x 34-mm Synergy stent. The mid right coronary artery was stented using a 4.0-mm x 16-mm Synergy stent lesion reduced from 80% to 0%. ECHO: 1/27/2019  Normal left ventricle size and systolic function with an estimated ejection  fraction of 55-60%. No regional wall motion abnormalities are seen. Mild concentric left ventricular hypertrophy. Normal function of all valves. [facility]: 1/28/2019  CORONARY ANGIOGRAM:  1.   There is single vessel disease in this right dominant circulation. 2.  Left main:  Free of disease. 3.  LAD in the proximal segment has 80% focal eccentric stenosis. The  first diagonal upper branch has a 95% stenosis. The rest of the vessel  has no major obstructive disease. 4.  Left circumflex artery. The previously stented site is widely  patent. The OM1 is patent as well. 5.  The right coronary artery also is a dominant vessel. The previously  placed stents are widely patent. There is mild luminal irregularities  in the distal RCA. CONCLUSION:  1. Single vessel coronary artery disease with 80% proximal LAD and 95%  proximal D1 lesions. 2.  Normal left ventricular size and systolic function. 3.  Normal hemodynamics. TECHNICAL COMMENTS:  The guide engaged the ostium well and provided goodsupport. Direct stenting of the proximal LAD was made. Serial  inflation up to 16 atmospheres which corresponds to a vessel size of  approximately 3.2. Final angiography 0% residual.  Next, the wire was  removed and passed into the diagonal after predilatation. A 2.25 x  12-mm stent was deployed and postdilated to a vessel size of 2.5. Followup angiography shows 0% residual.    CONCLUSION:  1. Successful stenting of the proximal LAD lesion reduced from 80% to 0%. A 3.0 x 15 ISAIAS stent used. 2.  Successful stenting of the first diagonal.  A 2.25 x 12 ISAIAS stent used to a final diameter of 2.5. [facility]: 11/17/20  CORONARY ANGIOGRAPHY FINDINGS:  1. Left main:  Free of disease. 2.  LAD:  Ostium has an 80% lesion. Beyond that, there are two stents  in the proximal and mid LAD which are widely patent. The extreme distal  LAD at the apex has two tandem lesions in the 80% range. 3.  The first diagonal that was stented may have some in-stent stenosis. 4.  The left circumflex has no major obstructive disease. 5.  The ramus has no major obstructive disease. 6.  The right coronary artery has no major obstructive disease.   7.  All the stents that are placed are widely patent. CONCLUSION:  1. High-grade ostial LAD narrowing in the 80% range; distal LAD has the  two tandem lesions in the 80% range. 2.  Normal left ventricular size and systolic function. 3.  Normal hemodynamics. Brachial arteriogram was performed and shows widely patent brachial  artery. CONCLUSION:  Successful PCI and stenting of the ostial LAD. A 3.5 x 12  ISAIAS stent was placed. Successful stenting of the distal LAD. A 2.75 x  15 ISAIAS stent was used. Assessment and Plan:    Fatigue  On his last visit he had complained about fatigue which I felt was due to labetalol. I switched him to doxazosin but he felt palpitations in his neck. He checked his pulse it was in the 120s. He then looked up adverse effects of doxazosin and it mention tachycardia so he stopped it    LE edema  Bilateral   No improvement despite the use of diuretic therapy  Following with Dr. Kenan Rock for venous insufficiency   Uses pneumatic compression device at home  Has cramps in his left leg whenever he uses it for over 40 minutes  I recommend that he use the compression device for shorter period and twice a day    Coronary artery disease  Presented in different ways each time  10/2006: previous stents to proximal RCA, prox Circumfles and mid LAD  Non obstructive CAD of mid-RCA and Diag 3  12/2016: stenting to mid RCA and proximal circumflex  1/2019: stent to proximal LAD and 1st diagonal   11/17/20: stent to ostial and distal LAD  Continue ASA, Plavix and Crestor    Essential Hypertension. (Over age 62, goal BP <150/90.)  BP is stable on valsartan  Continue medical management     Paroxysmal atrial fibrillation. CHADS-Vasc score 3 (HTN, AGE, CAD).    Xarelto stopped due to hematuria; Tried Eliquis 2.5 bid but too expensive in addition to bleeding; hematuria   Also tried Pradaxa; hematuria  Patient will not take Warfarin because of diffficulty in management and admissions to hospital for coagulopathy from time to time. Besides that also causes hematuria. Not a candidate for Watchman given his difficult anatomy. Have discussed in detail risk of stroke. Discussed oral anticoagulation to decrease the risk of thromboembolic events including stroke. Benefits and alternatives were discussed with patient. Risk of bleeding was discussed. Patient verbalized understanding. Tried Xarelto again and patient stopped taking it due to recurrence of bleeding. Remains on Plavix with no recurrent hematuria    Hyperlipidemia goal LDL <70  Severe intolerance to statins (myalgias)  Started Praluent with some side effects; itching, runny nose, hot flashes  Will have him try Rosuvastatin 5 mg twice weekly; Reviewed lipid panel from 3/9/22; LDL 78. Will have him repeat a fasting lipid and hepatic panel today. Acid reflux  Protonix 40 mg daily     Follow up in 6 months     Thank you for allowing me to participate in the care of your patient. Kristie Gamez MD       This note was scribed in the presence of Dr. Kristie Gamez MD by Armando Brasher RN  Physician Attestation:  The scribes documentation has been prepared under my direction and personally reviewed by me in its entirety. I confirm that the note above accurately reflects all work, treatment, procedures, and medical decision making performed by me.

## 2023-01-11 ENCOUNTER — TELEPHONE (OUTPATIENT)
Dept: CARDIOLOGY CLINIC | Age: 83
End: 2023-01-11

## 2023-01-11 DIAGNOSIS — I87.2 VENOUS INSUFFICIENCY: Primary | ICD-10-CM

## 2023-01-21 ENCOUNTER — HOSPITAL ENCOUNTER (OUTPATIENT)
Dept: CT IMAGING | Age: 83
Discharge: HOME | End: 2023-01-21
Payer: MEDICARE

## 2023-01-21 DIAGNOSIS — I87.2 VENOUS INSUFFICIENCY: ICD-10-CM

## 2023-01-21 LAB
GFR SERPL CREATININE-BSD FRML MDRD: 55 ML/MIN/{1.73_M2}
PERFORMED ON: ABNORMAL
POC CREATININE: 1.3 MG/DL (ref 0.8–1.3)
POC SAMPLE TYPE: ABNORMAL

## 2023-01-21 PROCEDURE — 6360000004 HC RX CONTRAST MEDICATION: Performed by: FAMILY MEDICINE

## 2023-01-21 PROCEDURE — 82565 ASSAY OF CREATININE: CPT

## 2023-01-21 PROCEDURE — 74174 CTA ABD&PLVS W/CONTRAST: CPT

## 2023-01-21 RX ADMIN — IOPAMIDOL 75 ML: 755 INJECTION, SOLUTION INTRAVENOUS at 08:21

## 2023-01-25 ENCOUNTER — OFFICE VISIT (OUTPATIENT)
Dept: CARDIOLOGY CLINIC | Age: 83
End: 2023-01-25
Payer: MEDICARE

## 2023-01-25 VITALS
WEIGHT: 298 LBS | DIASTOLIC BLOOD PRESSURE: 84 MMHG | BODY MASS INDEX: 42.66 KG/M2 | HEIGHT: 70 IN | HEART RATE: 75 BPM | SYSTOLIC BLOOD PRESSURE: 164 MMHG

## 2023-01-25 DIAGNOSIS — E78.2 MIXED HYPERLIPIDEMIA: ICD-10-CM

## 2023-01-25 DIAGNOSIS — I48.0 PAF (PAROXYSMAL ATRIAL FIBRILLATION) (HCC): ICD-10-CM

## 2023-01-25 DIAGNOSIS — I82.5Z3 CHRONIC DEEP VEIN THROMBOSIS (DVT) OF DISTAL VEIN OF BOTH LOWER EXTREMITIES (HCC): Primary | ICD-10-CM

## 2023-01-25 DIAGNOSIS — I10 ESSENTIAL HYPERTENSION: ICD-10-CM

## 2023-01-25 DIAGNOSIS — I25.10 CORONARY ARTERY DISEASE INVOLVING NATIVE CORONARY ARTERY OF NATIVE HEART WITHOUT ANGINA PECTORIS: ICD-10-CM

## 2023-01-25 PROCEDURE — 3074F SYST BP LT 130 MM HG: CPT | Performed by: INTERNAL MEDICINE

## 2023-01-25 PROCEDURE — G8427 DOCREV CUR MEDS BY ELIG CLIN: HCPCS | Performed by: INTERNAL MEDICINE

## 2023-01-25 PROCEDURE — 99204 OFFICE O/P NEW MOD 45 MIN: CPT | Performed by: INTERNAL MEDICINE

## 2023-01-25 PROCEDURE — G8484 FLU IMMUNIZE NO ADMIN: HCPCS | Performed by: INTERNAL MEDICINE

## 2023-01-25 PROCEDURE — 1123F ACP DISCUSS/DSCN MKR DOCD: CPT | Performed by: INTERNAL MEDICINE

## 2023-01-25 PROCEDURE — 1036F TOBACCO NON-USER: CPT | Performed by: INTERNAL MEDICINE

## 2023-01-25 PROCEDURE — 3078F DIAST BP <80 MM HG: CPT | Performed by: INTERNAL MEDICINE

## 2023-01-25 PROCEDURE — G8417 CALC BMI ABV UP PARAM F/U: HCPCS | Performed by: INTERNAL MEDICINE

## 2023-01-25 RX ORDER — DOXYCYCLINE HYCLATE 100 MG/1
CAPSULE ORAL
COMMUNITY
Start: 2022-12-09

## 2023-01-25 NOTE — PROGRESS NOTES
Interventional Cardiology Consultation     Francis Shirley  1940    PCP: Stanislav Wade MD  Referring Physician: Dr Yoav Estrella  Reason for Referral: IVC filter removal  Chief Complaint: \"I have increased swelling in my legs. \"    Subjective:     History of Present Illness: The patient is 81 y.o. male with a past medical history significant for coronary artery disease, venous insufficiency, atrial fibrillation, DVT s/p IVC filter, hypertension, and hyperlipidemia. Today, he is here to discuss IVC filter removal. He has increased swelling in both legs. He has recurrent cellulitis that he gets treatment. He keeps his legs wrapped and wears compression stockings as much possible. Patient denies exertional chest pain/pressure, dyspnea at rest, FINLEY, PND, orthopnea, palpitations, lightheadedness, weight changes, and syncope. Patient reports compliance to his medications. Past Medical History:   Diagnosis Date    Anesthesia     hallucinations for prostate surgery     Arthritis     Atrial fibrillation ([address]     CAD (coronary artery disease)     Cancer (HCC)     Kidney    DVT (deep venous thrombosis) (HCC)     CORNELIUS legs post prostate surgery 5 yrs ago    Gout     Hx of blood clots     Hypertension      Past Surgical History:   Procedure Laterality Date    BACK SURGERY      3 surgeries total.  Lumbar 3&4,    CARDIAC CATHETERIZATION  2011    CARDIAC SURGERY      stents placed- 1 in 2000 and 2 in 2002.  3 surgeries total    CATARACT REMOVAL Bilateral     CHOLECYSTECTOMY      COLONOSCOPY      HERNIA REPAIR Left      X2.   Left flank and right inguinal    JOINT REPLACEMENT  2009    right knee replacement    KIDNEY SURGERY      tumor removed     NASAL SEPTUM SURGERY      PRESSURE ULCER DEBRIDEMENT      had a stage 4 wound on coccyx 6 years ago    PROSTATE SURGERY      2012-prostate removal due to enlargement (not cancerous)    TUMOR REMOVAL Left     kidney    VENA CAVA FILTER PLACEMENT      5 yrs ago Family History   Problem Relation Age of Onset    Other Mother     Cancer Mother         ovarian    Other Father      Social History     Tobacco Use    Smoking status: Former     Packs/day: 0.50     Years: 6.00     Pack years: 3.00     Types: Cigarettes     Quit date: 1966     Years since quittin.5    Smokeless tobacco: Never   Vaping Use    Vaping Use: Never used   Substance Use Topics    Alcohol use: No    Drug use: No       Allergies   Allergen Reactions    Cardura [Doxazosin] Other (See Comments)     TACHYCARDIA    Sulfa Antibiotics Diarrhea and Other (See Comments)     Lower extremity weakness       Current Outpatient Medications   Medication Sig Dispense Refill    doxycycline hyclate (VIBRAMYCIN) 100 MG capsule Pt is taking 300 mg TID for 10 days      magnesium oxide (MAG-OX) 400 MG tablet Take 1 tablet by mouth 2 times daily 60 tablet 1    valsartan (DIOVAN) 160 MG tablet TAKE 1 TABLET BY MOUTH TWICE DAILY 180 tablet 2    clopidogrel (PLAVIX) 75 MG tablet TAKE 1 TABLET BY MOUTH EVERY DAY 90 tablet 3    spironolactone (ALDACTONE) 25 MG tablet TAKE 1 TABLET BY MOUTH EVERY DAY (Patient taking differently: 50 mg TAKE 1 TABLET BY MOUTH EVERY DAY) 90 tablet 3    rosuvastatin (CRESTOR) 5 MG tablet Take 1 tablet by mouth Twice a Week 15 tablet 3    zinc gluconate 50 MG tablet Take 50 mg by mouth daily      pantoprazole (PROTONIX) 40 MG tablet Take 1 tablet by mouth every morning (before breakfast) 90 tablet 5    labetalol (NORMODYNE) 300 MG tablet Take 1 tablet by mouth 2 times daily 180 tablet 3    Ascorbic Acid (VITAMIN C) 250 MG tablet Take 1,000 mg by mouth daily       aspirin 81 MG chewable tablet Take 1 tablet by mouth daily 30 tablet 3    b complex vitamins capsule Take 1 capsule by mouth daily      Cholecalciferol (VITAMIN D3) 2000 UNITS CAPS Take 1 capsule by mouth daily       nitroGLYCERIN (NITROSTAT) 0.4 MG SL tablet Place 1 tablet under the tongue every 5 minutes as needed for Chest pain 25 tablet 3    furosemide (LASIX) 40 MG tablet Take 40 mg by mouth in the morning and 40 mg in the evening. (Patient not taking: Reported on 1/25/2023)       No current facility-administered medications for this visit. Review of Systems:  Constitutional: No unanticipated weight loss. There's been no change in energy level, sleep pattern, or activity level. No fevers, chills. Eyes: No visual changes or diplopia. No scleral icterus. ENT: No Headaches, hearing loss or vertigo. No mouth sores or sore throat. Cardiovascular: as reviewed in HPI  Respiratory: No cough or wheezing, no sputum production. No hemoptysis. Gastrointestinal: No abdominal pain, appetite loss, blood in stools. No change in bowel or bladder habits. Genitourinary: No dysuria, trouble voiding, or hematuria. Musculoskeletal:  No gait disturbance, no joint complaints. Integumentary: No rash or pruritis. Neurological: No headache, diplopia, change in muscle strength, numbness or tingling. Psychiatric: No anxiety or depression. Endocrine: No temperature intolerance. No excessive thirst, fluid intake, or urination. No tremor. Hematologic/Lymphatic: No abnormal bruising or bleeding, blood clots or swollen lymph nodes. Allergic/Immunologic: No nasal congestion or hives. Physical Exam:   BP (!) 164/84   Pulse 75   Ht 5' 10\" (1.778 m)   Wt 298 lb (135.2 kg)   BMI 42.76 kg/m²   Wt Readings from Last 3 Encounters:   01/25/23 298 lb (135.2 kg)   10/12/22 295 lb (133.8 kg)   06/30/22 294 lb 12.8 oz (133.7 kg)     Constitutional: He is oriented to person, place, and time. He appears well-developed and well-nourished. In no acute distress. Head: Normocephalic and atraumatic. Pupils equal and round. Neck: Neck supple. No JVP or carotid bruit appreciated. No mass and no thyromegaly present. No lymphadenopathy present. Cardiovascular: Normal rate. Normal heart sounds. Exam reveals no gallop and no friction rub.  No murmur heard.  Pulmonary/Chest: Effort normal and breath sounds normal. No respiratory distress. He has no wheezes, rhonchi or rales. Abdominal: Soft, non-tender. Bowel sounds are normal. He exhibits no organomegaly, mass or bruit. Extremities: 4+ bilateral pitting edema, circumferential erythema with multiple areas of venous ulcers  Neurological: No gross cranial nerve deficit. Coordination normal.   Skin: Skin is warm and dry. There is no rash or diaphoresis. Psychiatric: He has a normal mood and affect. His speech is normal and behavior is normal.     Lab Review:   FLP:    Lab Results   Component Value Date/Time    TRIG 193 03/09/2022 08:59 AM    HDL 31 10/12/2022 02:14 PM    HDL 34 07/20/2011 03:34 PM    LDLCALC 69 10/12/2022 02:14 PM    LDLDIRECT 126 11/16/2020 04:02 PM    LABVLDL 29 10/12/2022 02:14 PM     BUN/Creatinine:    Lab Results   Component Value Date/Time    BUN 29 03/09/2022 08:59 AM    CREATININE 1.3 01/21/2023 08:22 AM    CREATININE 1.58 03/09/2022 08:59 AM     PT/INR, TNI, HGB A1C:   Lab Results   Component Value Date/Time    TROPONINI <0.01 01/28/2019 02:28 AM    LABA1C 6.7 (H) 07/20/2011 03:34 PM      No results found for: CBCAUTODIF    EKG Interpretation:     Echo:     Stress Test:     Cath:   Cardiac cath 11/19/2020  CONCLUSION:  1. High-grade ostial LAD narrowing in the 80% range; distal LAD has the  two tandem lesions in the 80% range. 2.  Normal left ventricular size and systolic function. 3.  Normal hemodynamics. Brachial arteriogram was performed and shows widely patent brachial  artery. INTERVENTIONAL PROCEDURE CATHETERS USED:  EBU3.5 guide, Runthrough wire,a 2.75 x 15 for the distal LAD and a 3.5 x 12 for the ostial LAD. Cardiac cath 1/30/19  CORONARY ANGIOGRAM:  1. There is single vessel disease in this right dominant circulation. 2.  Left main:  Free of disease. 3.  LAD in the proximal segment has 80% focal eccentric stenosis.   The  first diagonal upper branch has a 95% stenosis. The rest of the vessel  has no major obstructive disease. 4.  Left circumflex artery. The previously stented site is widely  patent. The OM1 is patent as well. 5.  The right coronary artery also is a dominant vessel. The previously  placed stents are widely patent. There is mild luminal irregularities  in the distal RCA. CONCLUSION:  1. Single vessel coronary artery disease with 80% proximal LAD and 95%  proximal D1 lesions. 2.  Normal left ventricular size and systolic function. 3.  Normal hemodynamics. INTERVENTIONAL PROCEDURE:  Catheters used are EBU 3.5 guide, Runthrough  wire. A 3.0 x 15-mm drug-eluting stent for the proximal LAD and a 2.25  x 12-mm [place] ISAIAS stent for the diagonal.  CT:  CTA venous 1/21/2023  IVC appears thread-like below the tip of the level of the inferior portion of   the vena cava filter suggesting chronic IVC occlusion. Right common iliac vein appears thread-like suggesting concomitant right   common iliac vein occlusion       Questionable filling defects seen in proximal left common iliac vein versus   artifact from mixing of unopacified and opacified blood       Multiple venous collaterals secondary to suspected IVC and right  iliac vein   occlusions       10 mm noncalcified nodule left lower lobe. Please see follow-up   recommendations below     Doppler:     All above diagnostic testing and laboratory data was independently visualized and reviewed by me (not simply review of report)       Assessment and Plan   1) DVT  CEAP 6 venous insufficiency; made worse after bilateral GSV venous ablation  Has severe bilateral iliac vein stenosis  Severe bilateral pitting edema  S/p IVC filter  Discussed IVC filter removal.;  And bilateral iliac vein reconstruction; likely multi -day  procedure including EKOS and thrombectomy  Explained the risks and benefits of the procedure and he is willing to proceed. 2) Coronary artery disease  Asymptomatic. Continue Asa, B-blocker and statin therapy. 3) Paroxsymal atrial fibrillation  No anticoagulation due to hematuria     4) Essential hypertension  Elevated    5) Bilateral lower extremity edema  +3 BLE edema  Continue Lasix. 6) Hyperlipidemia  Continue statin therapy. Thank you very much for allowing me to participate in the care of your patient. Please do not hesitate to contact me if you have any questions. Ori Dhaliwal MD [address] [place] [address], Interventional Cardiology, and Peripheral Vascular Disease   [facility]   Ph: [phone]  Fax: [phone]    This note was scribed in the presence of Dr Edu Alvarado, by Dionisio Silverio RN    Physician Attestation:  The scribes documentation has been prepared under my direction and personally reviewed by me in its entirety. I confirm the note above accurately reflects all work, treatment, procedures, and medical decision making performed by me.     Electronically signed by Archie Snow MD on 2/1/2023 at 7:04 PM

## 2023-01-25 NOTE — PATIENT INSTRUCTIONS
The morning of your procedure you will park in the hospital parking lot and report directly to the cath lab to check in.      Pre-Procedure Instructions   You will need to fast for at least 8 hours prior to procedure. No caffeine, gum or mints the morning of procedure. Hold your diuretics, LASIX, CHLORTHALIDONE AND SPIRONOLACTONE the morning of procedure. Hold all diabetic medications. BE SURE TO TAKE PLAVIX AND ASPIRIN THE MORNING OF THE PROCEDURE. All other medications can be taken in the morning with sips of water. Do not use any lotions, creams or perfume the morning of procedure. Pre-procedure lab work will need to be completed 5-7 days prior to procedure. Please have a responsible adult to drive you home after procedure. We advise you have someone stay with you for the first 24 hours for precautionary measures. Depending on your procedure you may require an overnight stay. Cath lab will provide you with all post procedure instructions. If you have any questions regarding the procedure itself or medications, please call [phone] and ask to speak with a nurse.

## 2023-01-26 ENCOUNTER — TELEPHONE (OUTPATIENT)
Dept: CARDIOLOGY CLINIC | Age: 83
End: 2023-01-26

## 2023-01-26 NOTE — TELEPHONE ENCOUNTER
Juan Carlos Salomon called in this afternoon, he is returning a call to [facility] to schedule a procedure. He can be reached at [phone] or [phone].

## 2023-01-26 NOTE — TELEPHONE ENCOUNTER
LOV 10/12/522  NOV 4/12/23      Last filled # 15 with 3 refills on 6/30/22      Component Ref Range & Units 10/12/22 1414 3/9/22 0859 6/10/21 1159 11/16/20 1602 7/7/19 1155 2/14/19 1224 12/9/16 1030   Cholesterol, Fasting 0 - 199 mg/dL 129          Triglyceride, Fasting 0 - 150 mg/dL 143          HDL 40 - 60 mg/dL 31 Low   22 Low  R, CM  23 Low  R, CM  31 Low   36 Low  R, CM  34 Low   30 Low     LDL Calculated <100 mg/dL 69  78 R  86 R  see below CM  39 R  99  104 High     VLDL Cholesterol Calculated Not Established mg/dL 29    see below CM   18  13

## 2023-01-27 RX ORDER — ROSUVASTATIN CALCIUM 5 MG/1
TABLET, COATED ORAL
Qty: 15 TABLET | Refills: 3 | Status: SHIPPED | OUTPATIENT
Start: 2023-01-27

## 2023-01-31 ENCOUNTER — OFFICE VISIT (OUTPATIENT)
Dept: CARDIOLOGY CLINIC | Age: 83
End: 2023-01-31
Payer: MEDICARE

## 2023-01-31 ENCOUNTER — TELEPHONE (OUTPATIENT)
Dept: CARDIOLOGY CLINIC | Age: 83
End: 2023-01-31

## 2023-01-31 VITALS
OXYGEN SATURATION: 96 % | SYSTOLIC BLOOD PRESSURE: 148 MMHG | WEIGHT: 292 LBS | HEART RATE: 75 BPM | BODY MASS INDEX: 41.9 KG/M2 | RESPIRATION RATE: 20 BRPM | DIASTOLIC BLOOD PRESSURE: 68 MMHG

## 2023-01-31 DIAGNOSIS — I10 ESSENTIAL HYPERTENSION: ICD-10-CM

## 2023-01-31 DIAGNOSIS — I25.10 CAD IN NATIVE ARTERY: Primary | ICD-10-CM

## 2023-01-31 DIAGNOSIS — R00.2 PALPITATIONS: ICD-10-CM

## 2023-01-31 DIAGNOSIS — I48.0 PAF (PAROXYSMAL ATRIAL FIBRILLATION) (HCC): ICD-10-CM

## 2023-01-31 DIAGNOSIS — R60.0 BILATERAL LOWER EXTREMITY EDEMA: ICD-10-CM

## 2023-01-31 DIAGNOSIS — E78.5 HYPERLIPIDEMIA LDL GOAL <70: ICD-10-CM

## 2023-01-31 DIAGNOSIS — I89.0 LYMPHEDEMA: ICD-10-CM

## 2023-01-31 DIAGNOSIS — Z86.718 HISTORY OF DVT (DEEP VEIN THROMBOSIS): ICD-10-CM

## 2023-01-31 PROCEDURE — 99214 OFFICE O/P EST MOD 30 MIN: CPT | Performed by: INTERNAL MEDICINE

## 2023-01-31 PROCEDURE — 1123F ACP DISCUSS/DSCN MKR DOCD: CPT | Performed by: INTERNAL MEDICINE

## 2023-01-31 PROCEDURE — G8417 CALC BMI ABV UP PARAM F/U: HCPCS | Performed by: INTERNAL MEDICINE

## 2023-01-31 PROCEDURE — 1036F TOBACCO NON-USER: CPT | Performed by: INTERNAL MEDICINE

## 2023-01-31 PROCEDURE — G8427 DOCREV CUR MEDS BY ELIG CLIN: HCPCS | Performed by: INTERNAL MEDICINE

## 2023-01-31 PROCEDURE — G8484 FLU IMMUNIZE NO ADMIN: HCPCS | Performed by: INTERNAL MEDICINE

## 2023-01-31 PROCEDURE — 3078F DIAST BP <80 MM HG: CPT | Performed by: INTERNAL MEDICINE

## 2023-01-31 PROCEDURE — 3077F SYST BP >= 140 MM HG: CPT | Performed by: INTERNAL MEDICINE

## 2023-01-31 RX ORDER — ISOSORBIDE MONONITRATE 60 MG/1
60 TABLET, EXTENDED RELEASE ORAL DAILY
Qty: 90 TABLET | Refills: 3 | Status: SHIPPED | OUTPATIENT
Start: 2023-01-31

## 2023-01-31 RX ORDER — CHLORTHALIDONE 25 MG/1
25 TABLET ORAL DAILY
Qty: 90 TABLET | Refills: 3 | Status: SHIPPED | OUTPATIENT
Start: 2023-01-31

## 2023-01-31 NOTE — PROGRESS NOTES
[address] [facility], [place] - Cardiology      Chief Complaint: \"Feeling okay, but had some palpitations. \"     History of present illness:   Jacquelin Richey is a 81 y.o. male with past medical history significant for venous insufficiency (Dr. Iris Wright) s/p vein procedure, CAD s/p multiple stents, atrial fibrillation, hypertension, DVT and hyperlipidemia. Diagnosed with severe MILY (8/2022) managed by pulmonology at [facility]. Left kidney CA managed by Dr. Concetta Tomlinson. He continued to complain of chest pain and most recent [facility] 11/17/20 showed patent previously placed stents and LAD lesions in the 80%. Ostial and distal LAD were both stented. Evaluated by Dr. Ronak Chi 1/2023 for possible IVC filter removal. Patient presents for follow up and management of CAD. He states overall he is doing well and questions if he should proceed with IVC filter removal. He reports palpitations on Saturday that he described as a \"fluttering. \" He states the sensation has resolved but notes an occasional palpitation. He denies any chest pains or worsening shortness of breath. He monitors his blood pressure at home and remains uncontrolled. He reports compliance with his medications and tolerating. He denies any abnormal bleeding or bruising. Patient denies exertional chest pain/pressure, dyspnea at rest, FINLEY, PND, orthopnea, lightheadedness, weight changes, changes in LE edema, and syncope.     Past Medical History:   Diagnosis Date    Anesthesia     hallucinations for prostate surgery     Arthritis     Atrial fibrillation ([address]     CAD (coronary artery disease)     Cancer ([address]     Kidney    DVT (deep venous thrombosis) (HCC)     CORNELIUS legs post prostate surgery 5 yrs ago    Gout     Hx of blood clots     Hypertension      Past Surgical History:   Procedure Laterality Date    BACK SURGERY      3 surgeries total.  Lumbar 3&4,    CARDIAC CATHETERIZATION  2011    CARDIAC SURGERY      stents placed- 1 in 2000 and 2 in .  3 surgeries total    CATARACT REMOVAL Bilateral     CHOLECYSTECTOMY      COLONOSCOPY      HERNIA REPAIR Left      X2. Left flank and right inguinal    JOINT REPLACEMENT  2009    right knee replacement    KIDNEY SURGERY      tumor removed     NASAL SEPTUM SURGERY      PRESSURE ULCER DEBRIDEMENT      had a stage 4 wound on coccyx 6 years ago    PROSTATE SURGERY      2012-prostate removal due to enlargement (not cancerous)    TUMOR REMOVAL Left     kidney    VENA CAVA FILTER PLACEMENT      5 yrs ago     Social History     Tobacco Use    Smoking status: Former     Packs/day: 0.50     Years: 6.00     Pack years: 3.00     Types: Cigarettes     Quit date: 1966     Years since quittin.5    Smokeless tobacco: Never   Substance Use Topics    Alcohol use: No     Review of Systems:   Constitutional: No fatigue, weakness or significant change in weight. HEENT: No change in vision or ringing in the ears. Respiratory: No FINLEY, PND, orthopnea or cough. Cardiovascular: See HPI    GI: No n/v, abdominal pain or changes in bowel habits. No melena, no hematochezia  : No dysuria or hematuria. Skin: No rash or new skin lesions. Musculoskeletal: No new muscle or joint pain. Neurological: No syncope or TIA-like symptoms. Psychiatric: No anxiety, insomnia or depression    Physical Exam:  Vitals:    23 1322 23 1336   BP: (!) 152/78 (!) 148/68   Site: Right Upper Arm Left Upper Arm   Pulse: 75    Resp: 20    SpO2: 96%    Weight: 292 lb (132.5 kg)      General:  Awake, alert, oriented in NAD. Currently pain free  Skin:  Warm and dry. No unusual bruising or rash  Neck:  Supple. No JVD or carotid bruit appreciated  Chest:  Normal effort. Clear to auscultation, no wheezes/rhonchi/rales  Cardiovascular:  RRR, S1/S2, no murmur/gallop/rub.  Chest wall soreness reproducible on palpation and movement  Abdomen:  Soft, nontender, +bowel sounds  Extremities:  2+ BLE edema, bilateral leg wraps   Neurological: No focal deficits  Psychological: Normal mood and affect    Current Outpatient Medications   Medication Sig Dispense Refill    rosuvastatin (CRESTOR) 5 MG tablet TAKE 1 TABLET BY MOUTH TWICE WEEKLY 15 tablet 3    magnesium oxide (MAG-OX) 400 MG tablet Take 1 tablet by mouth 2 times daily 60 tablet 1    valsartan (DIOVAN) 160 MG tablet TAKE 1 TABLET BY MOUTH TWICE DAILY 180 tablet 2    clopidogrel (PLAVIX) 75 MG tablet TAKE 1 TABLET BY MOUTH EVERY DAY 90 tablet 3    spironolactone (ALDACTONE) 25 MG tablet TAKE 1 TABLET BY MOUTH EVERY DAY (Patient taking differently: 50 mg TAKE 1 TABLET BY MOUTH EVERY DAY) 90 tablet 3    zinc gluconate 50 MG tablet Take 50 mg by mouth daily      pantoprazole (PROTONIX) 40 MG tablet Take 1 tablet by mouth every morning (before breakfast) 90 tablet 5    labetalol (NORMODYNE) 300 MG tablet Take 1 tablet by mouth 2 times daily 180 tablet 3    Ascorbic Acid (VITAMIN C) 250 MG tablet Take 1,000 mg by mouth daily       aspirin 81 MG chewable tablet Take 1 tablet by mouth daily 30 tablet 3    b complex vitamins capsule Take 1 capsule by mouth daily      Cholecalciferol (VITAMIN D3) 2000 UNITS CAPS Take 1 capsule by mouth daily       nitroGLYCERIN (NITROSTAT) 0.4 MG SL tablet Place 1 tablet under the tongue every 5 minutes as needed for Chest pain 25 tablet 3    doxycycline hyclate (VIBRAMYCIN) 100 MG capsule Pt is taking 300 mg TID for 10 days (Patient not taking: Reported on 1/31/2023)      furosemide (LASIX) 40 MG tablet Take 40 mg by mouth in the morning and 40 mg in the evening. (Patient not taking: No sig reported)       No current facility-administered medications for this visit.      Labs:   Lab Results   Component Value Date    WBC 8.4 03/09/2022    HGB 12.1 (L) 03/09/2022    HCT 39.4 03/09/2022    MCV 91 03/09/2022     03/09/2022     Lab Results   Component Value Date/Time     03/09/2022 08:59 AM    K 4.2 03/09/2022 08:59 AM    K 3.8 01/29/2019 04:48 AM     03/09/2022 08:59 AM    CO2 29.0 03/09/2022 08:59 AM    BUN 29 03/09/2022 08:59 AM    CREATININE 1.3 01/21/2023 08:22 AM    CREATININE 1.58 03/09/2022 08:59 AM    GLUCOSE 110 03/09/2022 08:59 AM    CALCIUM 9.8 03/09/2022 08:59 AM    CALCIUM 9.8 03/09/2022 08:59 AM      Lab Results   Component Value Date/Time    TRIG 193 03/09/2022 08:59 AM    HDL 31 10/12/2022 02:14 PM    HDL 34 07/20/2011 03:34 PM    LDLCALC 69 10/12/2022 02:14 PM    LDLDIRECT 126 11/16/2020 04:02 PM    LABVLDL 29 10/12/2022 02:14 PM           CATH 12/9/16  1. Two-vessel coronary disease with two tandem lesions in the 80-90% range involving the proximal circumflex. 2. An 80% eccentric mid RCA lesion. 3. Normal left ventricular size and systolic function. 4. Normal hemodynamics. Successful stenting of the proximal circumflex. Two tandem lesions were reduced to 0% using a 3.0-mm x 34-mm Synergy stent. The mid right coronary artery was stented using a 4.0-mm x 16-mm Synergy stent lesion reduced from 80% to 0%. ECHO 12/9/2016  Normal LV size and systolic function: EF is  60%. Grade I diastolic  dysfunction. Trace mitral regurgitation is present. The left atrium is normal in size. Normal right ventricular size and function. There is trace to mild tricuspid regurgitation with RVSP estimated at 40  mmHg. which is mildly elevated. Trivial pulmonic regurgitation present. Coronary Angiogram and PCI: 12/2016  1. Two-vessel coronary disease with two tandem lesions in the 80-90% range involving the proximal circumflex. 2. An 80% eccentric mid RCA lesion. 3. Normal left ventricular size and systolic function. 4. Normal hemodynamics  Successful stenting of the proximal circumflex. Two tandem lesions were reduced to 0% using a 3.0-mm x 34-mm Synergy stent. The mid right coronary artery was stented using a 4.0-mm x 16-mm Synergy stent lesion reduced from 80% to 0%.     ECHO: 1/27/2019  Normal left ventricle size and systolic function with an estimated ejection  fraction of 55-60%. No regional wall motion abnormalities are seen. Mild concentric left ventricular hypertrophy. Normal function of all valves. [facility]: 1/28/2019  CORONARY ANGIOGRAM:  1. There is single vessel disease in this right dominant circulation. 2.  Left main:  Free of disease. 3.  LAD in the proximal segment has 80% focal eccentric stenosis. The  first diagonal upper branch has a 95% stenosis. The rest of the vessel  has no major obstructive disease. 4.  Left circumflex artery. The previously stented site is widely  patent. The OM1 is patent as well. 5.  The right coronary artery also is a dominant vessel. The previously  placed stents are widely patent. There is mild luminal irregularities  in the distal RCA. CONCLUSION:  1. Single vessel coronary artery disease with 80% proximal LAD and 95%  proximal D1 lesions. 2.  Normal left ventricular size and systolic function. 3.  Normal hemodynamics. TECHNICAL COMMENTS:  The guide engaged the ostium well and provided goodsupport. Direct stenting of the proximal LAD was made. Serial  inflation up to 16 atmospheres which corresponds to a vessel size of  approximately 3.2. Final angiography 0% residual.  Next, the wire was  removed and passed into the diagonal after predilatation. A 2.25 x  12-mm stent was deployed and postdilated to a vessel size of 2.5. Followup angiography shows 0% residual.    CONCLUSION:  1. Successful stenting of the proximal LAD lesion reduced from 80% to 0%. A 3.0 x 15 ISAIAS stent used. 2.  Successful stenting of the first diagonal.  A 2.25 x 12 ISAIAS stent used to a final diameter of 2.5. [facility]: 11/17/20  CORONARY ANGIOGRAPHY FINDINGS:  1. Left main:  Free of disease. 2.  LAD:  Ostium has an 80% lesion. Beyond that, there are two stents  in the proximal and mid LAD which are widely patent. The extreme distal  LAD at the apex has two tandem lesions in the 80% range.   3.  The first diagonal that was stented may have some in-stent stenosis. 4.  The left circumflex has no major obstructive disease. 5.  The ramus has no major obstructive disease. 6.  The right coronary artery has no major obstructive disease. 7.  All the stents that are placed are widely patent. CONCLUSION:  1. High-grade ostial LAD narrowing in the 80% range; distal LAD has the  two tandem lesions in the 80% range. 2.  Normal left ventricular size and systolic function. 3.  Normal hemodynamics. Brachial arteriogram was performed and shows widely patent brachial  artery. CONCLUSION:  Successful PCI and stenting of the ostial LAD. A 3.5 x 12  ISAIAS stent was placed. Successful stenting of the distal LAD. A 2.75 x  15 ISAIAS stent was used. CTA Venous ABD/Pelvis 1/2023  IVC appears thread-like below the tip of the level of the inferior portion of   the vena cava filter suggesting chronic IVC occlusion. Right common iliac vein appears thread-like suggesting concomitant right   common iliac vein occlusion   Questionable filling defects seen in proximal left common iliac vein versus   artifact from mixing of unopacified and opacified blood   Multiple venous collaterals secondary to suspected IVC and right  iliac vein   occlusions   10 mm noncalcified nodule left lower lobe.   Please see follow-up   recommendations below     Assessment and Plan:    Coronary artery disease  Presented in different ways each time  10/2006: previous stents to proximal RCA, prox Circumfles and mid LAD  Non obstructive CAD of mid-RCA and Diag 3  12/2016: stenting to mid RCA and proximal circumflex  1/2019: stent to proximal LAD and 1st diagonal   11/17/20: stent to ostial and distal LAD  Continue ASA, Plavix and Crestor    Essential Hypertension  Uncontrolled   Reports home readings have been elevated in the mornings  Continue medical management   Patient states he thinks he is taking Chlorthalidone 25 mg daily   Will start isosorbide 60 mg daily and if remains uncontrolled will try Doxazosin     Paroxysmal atrial fibrillation  Reports palpitations, longest episode lasted 2 days but now resolved   CHADS-Vasc score 3 (HTN, AGE, CAD). Xarelto stopped due to hematuria  Tried Eliquis 2.5 bid but too expensive in addition to bleeding; hematuria, also tried Pradaxa; hematuria  Patient will not take Warfarin because of diffficulty in management and admissions to hospital for coagulopathy from time to time. Besides that also causes hematuria. Not a candidate for Watchman given his difficult anatomy. Have discussed in detail risk of stroke. Discussed oral anticoagulation to decrease the risk of thromboembolic events including stroke. Benefits and alternatives were discussed with patient. Risk of bleeding was discussed. Patient verbalized understanding. Tolerating ASA and Plavix without any recurrent bleeding   Will arrange for an event monitor to assess Afib burden with reports of recurrent palpitations     Hyperlipidemia goal LDL <70  Severe intolerance to statins (myalgias)  Started Praluent with some side effects; itching, runny nose, hot flashes  Tolerating Rosuvastatin 5 mg twice weekly  LDL 69  Will try insurance approval for Leqvio    DVT  Remote history of IVC filter  Evaluated by Dr. Edu Alvarado and scheduled for removal 2/9/23    Lower extremity edema/lymphedema  Bilateral, remains unchanged   Previously followed with Dr. Zaina Dickerson for venous insufficiency   Unfortunately he is not a candidate for repeat Varithena procedure as he has had this in the past with Dr. Zaina Dickerson and will not benefit from repeat procedure. Continue Lasix and use of pneumatic compression device  Recurrent cellulitis, encouraged to resume antibiotics   Following with [facility] wound center     Follow up in 6 months     Thank you for allowing me to participate in the care of your patient. Gilford Heap, MD       This note was scribed in the presence of Dr López Mills MD by Michael Sidhu RN. Physician Attestation:  The scribes documentation has been prepared under my direction and personally reviewed by me in its entirety. I confirm that the note above accurately reflects all work, treatment, procedures, and medical decision making performed by me.

## 2023-01-31 NOTE — PROGRESS NOTES
[address] [facility] - Cardiology      Chief Complaint: \"      History of present illness:   Surinder Wright is a 81 y.o. male with past medical history significant for venous insufficiency (Dr. Radha Wheeler) s/p vein procedure, CAD s/p multiple stents, atrial fibrillation, hypertension, DVT and hyperlipidemia. Diagnosed with severe MILY (8/2022) managed by pulmonology at [facility]. Left kidney CA managed by Dr. Jorge Florez. He continued to complain of chest pain and most recent [facility] 11/17/20 showed patent previously placed stents and LAD lesions in the 80%. Ostial and distal LAD were both stented. Evaluated by Dr. Julia Mcclain 1/2023 for possible IVC filter removal.      Patient presents for follow up and management of CAD. Past Medical History:   Diagnosis Date    Anesthesia     hallucinations for prostate surgery     Arthritis     Atrial fibrillation ([facility] [address]     CAD (coronary artery disease)     Cancer (HCC)     Kidney    DVT (deep venous thrombosis) (HCC)     CORNELIUS legs post prostate surgery 5 yrs ago    Gout     Hx of blood clots     Hypertension      Past Surgical History:   Procedure Laterality Date    BACK SURGERY      3 surgeries total.  Lumbar 3&4,    CARDIAC CATHETERIZATION  2011    CARDIAC SURGERY      stents placed- 1 in 2000 and 2 in 2002.  3 surgeries total    CATARACT REMOVAL Bilateral     CHOLECYSTECTOMY      COLONOSCOPY      HERNIA REPAIR Left      X2.   Left flank and right inguinal    JOINT REPLACEMENT  2009    right knee replacement    KIDNEY SURGERY      tumor removed     NASAL SEPTUM SURGERY      PRESSURE ULCER DEBRIDEMENT      had a stage 4 wound on coccyx 6 years ago    PROSTATE SURGERY      2012-prostate removal due to enlargement (not cancerous)    TUMOR REMOVAL Left     kidney    VENA CAVA FILTER PLACEMENT      5 yrs ago     Social History     Tobacco Use    Smoking status: Former     Packs/day: 0.50     Years: 6.00     Pack years: 3.00     Types: Cigarettes Quit date: 1966     Years since quittin.5    Smokeless tobacco: Never   Substance Use Topics    Alcohol use: No       Review of Systems:   Constitutional: No fatigue, weakness or significant change in weight. HEENT: No change in vision or ringing in the ears. Respiratory: No FINLEY, PND, orthopnea or cough. Cardiovascular: See HPI    GI: No n/v, abdominal pain or changes in bowel habits. No melena, no hematochezia  : No dysuria or hematuria. Skin: No rash or new skin lesions. Musculoskeletal: No new muscle or joint pain. Neurological: No syncope or TIA-like symptoms. Psychiatric: No anxiety, insomnia or depression    Physical Exam:  There were no vitals filed for this visit. General:  Awake, alert, oriented in NAD. Currently pain free  Skin:  Warm and dry. No unusual bruising or rash  Neck:  Supple. No JVD or carotid bruit appreciated  Chest:  Normal effort. Clear to auscultation, no wheezes/rhonchi/rales  Cardiovascular:  RRR, S1/S2, no murmur/gallop/rub. Chest wall soreness reproducible on palpation and movement  Abdomen:  Soft, nontender, +bowel sounds  Extremities:  No edema  Neurological: No focal deficits  Psychological: Normal mood and affect      Current Outpatient Medications   Medication Sig Dispense Refill    rosuvastatin (CRESTOR) 5 MG tablet TAKE 1 TABLET BY MOUTH TWICE WEEKLY 15 tablet 3    doxycycline hyclate (VIBRAMYCIN) 100 MG capsule Pt is taking 300 mg TID for 10 days      furosemide (LASIX) 40 MG tablet Take 40 mg by mouth in the morning and 40 mg in the evening.  (Patient not taking: Reported on 2023)      magnesium oxide (MAG-OX) 400 MG tablet Take 1 tablet by mouth 2 times daily 60 tablet 1    valsartan (DIOVAN) 160 MG tablet TAKE 1 TABLET BY MOUTH TWICE DAILY 180 tablet 2    clopidogrel (PLAVIX) 75 MG tablet TAKE 1 TABLET BY MOUTH EVERY DAY 90 tablet 3    spironolactone (ALDACTONE) 25 MG tablet TAKE 1 TABLET BY MOUTH EVERY DAY (Patient taking differently: 50 mg TAKE 1 TABLET BY MOUTH EVERY DAY) 90 tablet 3    zinc gluconate 50 MG tablet Take 50 mg by mouth daily      pantoprazole (PROTONIX) 40 MG tablet Take 1 tablet by mouth every morning (before breakfast) 90 tablet 5    labetalol (NORMODYNE) 300 MG tablet Take 1 tablet by mouth 2 times daily 180 tablet 3    Ascorbic Acid (VITAMIN C) 250 MG tablet Take 1,000 mg by mouth daily       aspirin 81 MG chewable tablet Take 1 tablet by mouth daily 30 tablet 3    b complex vitamins capsule Take 1 capsule by mouth daily      Cholecalciferol (VITAMIN D3) 2000 UNITS CAPS Take 1 capsule by mouth daily       nitroGLYCERIN (NITROSTAT) 0.4 MG SL tablet Place 1 tablet under the tongue every 5 minutes as needed for Chest pain 25 tablet 3     No current facility-administered medications for this visit. Labs:   Lab Results   Component Value Date    WBC 8.4 03/09/2022    HGB 12.1 (L) 03/09/2022    HCT 39.4 03/09/2022    MCV 91 03/09/2022     03/09/2022     Lab Results   Component Value Date/Time     03/09/2022 08:59 AM    K 4.2 03/09/2022 08:59 AM    K 3.8 01/29/2019 04:48 AM     03/09/2022 08:59 AM    CO2 29.0 03/09/2022 08:59 AM    BUN 29 03/09/2022 08:59 AM    CREATININE 1.3 01/21/2023 08:22 AM    CREATININE 1.58 03/09/2022 08:59 AM    GLUCOSE 110 03/09/2022 08:59 AM    CALCIUM 9.8 03/09/2022 08:59 AM    CALCIUM 9.8 03/09/2022 08:59 AM      Lab Results   Component Value Date/Time    TRIG 193 03/09/2022 08:59 AM    HDL 31 10/12/2022 02:14 PM    HDL 34 07/20/2011 03:34 PM    LDLCALC 69 10/12/2022 02:14 PM    LDLDIRECT 126 11/16/2020 04:02 PM    LABVLDL 29 10/12/2022 02:14 PM       EKG          CATH 12/9/16  1. Two-vessel coronary disease with two tandem lesions in the 80-90% range involving the proximal circumflex. 2. An 80% eccentric mid RCA lesion. 3. Normal left ventricular size and systolic function. 4. Normal hemodynamics. Successful stenting of the proximal circumflex.  Two tandem lesions were reduced to 0% using a 3.0-mm x 34-mm Synergy stent. The mid right coronary artery was stented using a 4.0-mm x 16-mm Synergy stent lesion reduced from 80% to 0%. ECHO 12/9/2016   Normal LV size and systolic function: EF is  60%. Grade I diastolic  dysfunction. Trace mitral regurgitation is present. The left atrium is normal in size. Normal right ventricular size and function. There is trace to mild tricuspid regurgitation with RVSP estimated at 40  mmHg. which is mildly elevated. Trivial pulmonic regurgitation present. Coronary Angiogram and PCI: 12/2016  1. Two-vessel coronary disease with two tandem lesions in the 80-90% range involving the proximal circumflex. 2. An 80% eccentric mid RCA lesion. 3. Normal left ventricular size and systolic function. 4. Normal hemodynamics  Successful stenting of the proximal circumflex. Two tandem lesions were reduced to 0% using a 3.0-mm x 34-mm Synergy stent. The mid right coronary artery was stented using a 4.0-mm x 16-mm Synergy stent lesion reduced from 80% to 0%. ECHO: 1/27/2019  Normal left ventricle size and systolic function with an estimated ejection  fraction of 55-60%. No regional wall motion abnormalities are seen. Mild concentric left ventricular hypertrophy. Normal function of all valves. [facility]: 1/28/2019  CORONARY ANGIOGRAM:  1. There is single vessel disease in this right dominant circulation. 2.  Left main:  Free of disease. 3.  LAD in the proximal segment has 80% focal eccentric stenosis. The  first diagonal upper branch has a 95% stenosis. The rest of the vessel  has no major obstructive disease. 4.  Left circumflex artery. The previously stented site is widely  patent. The OM1 is patent as well. 5.  The right coronary artery also is a dominant vessel. The previously  placed stents are widely patent. There is mild luminal irregularities  in the distal RCA. CONCLUSION:  1.   Single vessel coronary artery disease with 80% proximal LAD and 95%  proximal D1 lesions. 2.  Normal left ventricular size and systolic function. 3.  Normal hemodynamics. TECHNICAL COMMENTS:  The guide engaged the ostium well and provided goodsupport. Direct stenting of the proximal LAD was made. Serial  inflation up to 16 atmospheres which corresponds to a vessel size of  approximately 3.2. Final angiography 0% residual.  Next, the wire was  removed and passed into the diagonal after predilatation. A 2.25 x  12-mm stent was deployed and postdilated to a vessel size of 2.5. Followup angiography shows 0% residual.    CONCLUSION:  1. Successful stenting of the proximal LAD lesion reduced from 80% to 0%. A 3.0 x 15 ISAIAS stent used. 2.  Successful stenting of the first diagonal.  A 2.25 x 12 ISAIAS stent used to a final diameter of 2.5. [facility]: 11/17/20  CORONARY ANGIOGRAPHY FINDINGS:  1. Left main:  Free of disease. 2.  LAD:  Ostium has an 80% lesion. Beyond that, there are two stents  in the proximal and mid LAD which are widely patent. The extreme distal  LAD at the apex has two tandem lesions in the 80% range. 3.  The first diagonal that was stented may have some in-stent stenosis. 4.  The left circumflex has no major obstructive disease. 5.  The ramus has no major obstructive disease. 6.  The right coronary artery has no major obstructive disease. 7.  All the stents that are placed are widely patent. CONCLUSION:  1. High-grade ostial LAD narrowing in the 80% range; distal LAD has the  two tandem lesions in the 80% range. 2.  Normal left ventricular size and systolic function. 3.  Normal hemodynamics. Brachial arteriogram was performed and shows widely patent brachial  artery. CONCLUSION:  Successful PCI and stenting of the ostial LAD. A 3.5 x 12  ISAIAS stent was placed. Successful stenting of the distal LAD. A 2.75 x  15 ISAIAS stent was used.     CTA Venous ABD/Pelvis 1/2023  IVC appears thread-like below the tip of the level of the inferior portion of   the vena cava filter suggesting chronic IVC occlusion. Right common iliac vein appears thread-like suggesting concomitant right   common iliac vein occlusion       Questionable filling defects seen in proximal left common iliac vein versus   artifact from mixing of unopacified and opacified blood       Multiple venous collaterals secondary to suspected IVC and right  iliac vein   occlusions       10 mm noncalcified nodule left lower lobe. Please see follow-up   recommendations below           Assessment and Plan:    Coronary artery disease  Presented in different ways each time  10/2006: previous stents to proximal RCA, prox Circumfles and mid LAD  Non obstructive CAD of mid-RCA and Diag 3  12/2016: stenting to mid RCA and proximal circumflex  1/2019: stent to proximal LAD and 1st diagonal   11/17/20: stent to ostial and distal LAD  Continue ASA, Plavix and Crestor    Essential Hypertension. (Over age 62, goal BP <150/90)  BP is stable on valsartan and labetalol   Continue medical management     Paroxysmal atrial fibrillation. CHADS-Vasc score 3 (HTN, AGE, CAD). Xarelto stopped due to hematuria; Tried Eliquis 2.5 bid but too expensive in addition to bleeding; hematuria   Also tried Pradaxa; hematuria  Patient will not take Warfarin because of diffficulty in management and admissions to hospital for coagulopathy from time to time. Besides that also causes hematuria. Not a candidate for Watchman given his difficult anatomy. Have discussed in detail risk of stroke. Discussed oral anticoagulation to decrease the risk of thromboembolic events including stroke. Benefits and alternatives were discussed with patient. Risk of bleeding was discussed. Patient verbalized understanding. Tried Xarelto again and patient stopped taking it due to recurrence of bleeding.    Remains on Plavix with no recurrent hematuria    Hyperlipidemia goal LDL <70  Severe intolerance to statins (myalgias)  Started Praluent with some side effects; itching, runny nose, hot flashes  Will have him try Rosuvastatin 5 mg twice weekly;   lipid panel from 3/9/22; LDL 78. Most recent lipid panel 10/12/22; LDL 69    DVT  Remote history of IVC filter  Referred to Dr. Ivania Garner for removal    Lower extremity edema/lymphedema  Bilateral   Previously followed with Dr. Simran Hazel for venous insufficiency   Unfortunately he is not a candidate for repeat Varithena procedure as he has had this in the past with Dr. Simran Hazel and will not benefit from repeat procedure. Continue Lasix and use of pneumatic compression device    Follow up in 6 months     Thank you for allowing me to participate in the care of your patient.       Evelio Cheng MD

## 2023-01-31 NOTE — TELEPHONE ENCOUNTER
Patient seen in office and an event monitor was ordered. Please enroll and mail monitor to patient's house.

## 2023-02-02 DIAGNOSIS — E78.5 HYPERLIPIDEMIA, UNSPECIFIED HYPERLIPIDEMIA TYPE: ICD-10-CM

## 2023-02-02 RX ORDER — DIPHENHYDRAMINE HYDROCHLORIDE 50 MG/ML
50 INJECTION INTRAMUSCULAR; INTRAVENOUS
OUTPATIENT
Start: 2023-02-16

## 2023-02-02 RX ORDER — ACETAMINOPHEN 325 MG/1
650 TABLET ORAL
OUTPATIENT
Start: 2023-02-16

## 2023-02-02 RX ORDER — ONDANSETRON 2 MG/ML
8 INJECTION INTRAMUSCULAR; INTRAVENOUS
OUTPATIENT
Start: 2023-02-16

## 2023-02-02 RX ORDER — EPINEPHRINE 1 MG/ML
0.3 INJECTION, SOLUTION, CONCENTRATE INTRAVENOUS PRN
OUTPATIENT
Start: 2023-02-16

## 2023-02-02 RX ORDER — ALBUTEROL SULFATE 90 UG/1
4 AEROSOL, METERED RESPIRATORY (INHALATION) PRN
OUTPATIENT
Start: 2023-02-16

## 2023-02-02 RX ORDER — SODIUM CHLORIDE 9 MG/ML
INJECTION, SOLUTION INTRAVENOUS CONTINUOUS
OUTPATIENT
Start: 2023-02-16

## 2023-02-07 RX ORDER — ROSUVASTATIN CALCIUM 5 MG/1
TABLET, COATED ORAL
Qty: 24 TABLET | Refills: 2 | OUTPATIENT
Start: 2023-02-07

## 2023-02-09 ENCOUNTER — HOSPITAL ENCOUNTER (INPATIENT)
Dept: CARDIAC CATH/INVASIVE PROCEDURES | Age: 83
LOS: 2 days | Discharge: HOME | DRG: 253 | End: 2023-02-11
Attending: INTERNAL MEDICINE
Payer: MEDICARE

## 2023-02-09 PROBLEM — I82.412 DVT FEMORAL (DEEP VENOUS THROMBOSIS) WITH THROMBOPHLEBITIS, LEFT (HCC): Status: ACTIVE | Noted: 2023-02-09

## 2023-02-09 LAB
ANION GAP SERPL CALCULATED.3IONS-SCNC: 10 MMOL/L (ref 3–16)
BUN BLDV-MCNC: 22 MG/DL (ref 7–20)
CALCIUM SERPL-MCNC: 9.9 MG/DL (ref 8.3–10.6)
CHLORIDE BLD-SCNC: 106 MMOL/L (ref 99–110)
CO2: 25 MMOL/L (ref 21–32)
CREAT SERPL-MCNC: 1.2 MG/DL (ref 0.8–1.3)
FIBRINOGEN: 403 MG/DL (ref 207–509)
GFR SERPL CREATININE-BSD FRML MDRD: >60 ML/MIN/{1.73_M2}
GLUCOSE BLD-MCNC: 108 MG/DL (ref 70–99)
HCT VFR BLD CALC: 38 % (ref 40.5–52.5)
HEMOGLOBIN: 12.5 G/DL (ref 13.5–17.5)
MCH RBC QN AUTO: 29 PG (ref 26–34)
MCHC RBC AUTO-ENTMCNC: 32.8 G/DL (ref 31–36)
MCV RBC AUTO: 88.5 FL (ref 80–100)
PDW BLD-RTO: 16 % (ref 12.4–15.4)
PLATELET # BLD: 144 K/UL (ref 135–450)
PMV BLD AUTO: 7.2 FL (ref 5–10.5)
POTASSIUM SERPL-SCNC: 4.3 MMOL/L (ref 3.5–5.1)
RBC # BLD: 4.29 M/UL (ref 4.2–5.9)
SODIUM BLD-SCNC: 141 MMOL/L (ref 136–145)
WBC # BLD: 6.9 K/UL (ref 4–11)

## 2023-02-09 PROCEDURE — 04FH3Z0 FRAGMENTATION OF RIGHT EXTERNAL ILIAC ARTERY, PERCUTANEOUS APPROACH, ULTRASONIC: ICD-10-PCS | Performed by: INTERNAL MEDICINE

## 2023-02-09 PROCEDURE — C1769 GUIDE WIRE: HCPCS

## 2023-02-09 PROCEDURE — 6360000004 HC RX CONTRAST MEDICATION: Performed by: INTERNAL MEDICINE

## 2023-02-09 PROCEDURE — 2580000003 HC RX 258: Performed by: INTERNAL MEDICINE

## 2023-02-09 PROCEDURE — 75822 VEIN X-RAY ARMS/LEGS: CPT | Performed by: INTERNAL MEDICINE

## 2023-02-09 PROCEDURE — 6370000000 HC RX 637 (ALT 250 FOR IP): Performed by: INTERNAL MEDICINE

## 2023-02-09 PROCEDURE — 2580000003 HC RX 258

## 2023-02-09 PROCEDURE — 6360000002 HC RX W HCPCS

## 2023-02-09 PROCEDURE — 37248 TRLUML BALO ANGIOP 1ST VEIN: CPT | Performed by: INTERNAL MEDICINE

## 2023-02-09 PROCEDURE — 37212 THROMBOLYTIC VENOUS THERAPY: CPT | Performed by: INTERNAL MEDICINE

## 2023-02-09 PROCEDURE — 36010 PLACE CATHETER IN VEIN: CPT | Performed by: INTERNAL MEDICINE

## 2023-02-09 PROCEDURE — C1773 RET DEV, INSERTABLE: HCPCS

## 2023-02-09 PROCEDURE — 85027 COMPLETE CBC AUTOMATED: CPT

## 2023-02-09 PROCEDURE — C1887 CATHETER, GUIDING: HCPCS

## 2023-02-09 PROCEDURE — 2709999900 HC NON-CHARGEABLE SUPPLY

## 2023-02-09 PROCEDURE — 2100000000 HC CCU R&B

## 2023-02-09 PROCEDURE — 75820 VEIN X-RAY ARM/LEG: CPT

## 2023-02-09 PROCEDURE — C1751 CATH, INF, PER/CENT/MIDLINE: HCPCS

## 2023-02-09 PROCEDURE — 85384 FIBRINOGEN ACTIVITY: CPT

## 2023-02-09 PROCEDURE — C1894 INTRO/SHEATH, NON-LASER: HCPCS

## 2023-02-09 PROCEDURE — 047H3ZZ DILATION OF RIGHT EXTERNAL ILIAC ARTERY, PERCUTANEOUS APPROACH: ICD-10-PCS | Performed by: INTERNAL MEDICINE

## 2023-02-09 PROCEDURE — 06H03DZ INSERTION OF INTRALUMINAL DEVICE INTO INFERIOR VENA CAVA, PERCUTANEOUS APPROACH: ICD-10-PCS | Performed by: INTERNAL MEDICINE

## 2023-02-09 PROCEDURE — 2500000003 HC RX 250 WO HCPCS

## 2023-02-09 PROCEDURE — 99153 MOD SED SAME PHYS/QHP EA: CPT

## 2023-02-09 PROCEDURE — 37212 THROMBOLYTIC VENOUS THERAPY: CPT

## 2023-02-09 PROCEDURE — 99152 MOD SED SAME PHYS/QHP 5/>YRS: CPT

## 2023-02-09 PROCEDURE — 80048 BASIC METABOLIC PNL TOTAL CA: CPT

## 2023-02-09 PROCEDURE — C1725 CATH, TRANSLUMIN NON-LASER: HCPCS

## 2023-02-09 PROCEDURE — 6360000002 HC RX W HCPCS: Performed by: INTERNAL MEDICINE

## 2023-02-09 PROCEDURE — 3E05317 INTRODUCTION OF OTHER THROMBOLYTIC INTO PERIPHERAL ARTERY, PERCUTANEOUS APPROACH: ICD-10-PCS | Performed by: INTERNAL MEDICINE

## 2023-02-09 PROCEDURE — 36010 PLACE CATHETER IN VEIN: CPT

## 2023-02-09 PROCEDURE — 36005 INJECTION EXT VENOGRAPHY: CPT

## 2023-02-09 RX ORDER — HEPARIN SODIUM AND DEXTROSE 10000; 5 [USP'U]/100ML; G/100ML
500 INJECTION INTRAVENOUS CONTINUOUS
Status: DISCONTINUED | OUTPATIENT
Start: 2023-02-09 | End: 2023-02-09

## 2023-02-09 RX ORDER — SODIUM CHLORIDE 9 MG/ML
INJECTION, SOLUTION INTRAVENOUS PRN
Status: DISCONTINUED | OUTPATIENT
Start: 2023-02-09 | End: 2023-02-09 | Stop reason: SDUPTHER

## 2023-02-09 RX ORDER — ENOXAPARIN SODIUM 100 MG/ML
30 INJECTION SUBCUTANEOUS 2 TIMES DAILY
Status: DISCONTINUED | OUTPATIENT
Start: 2023-02-09 | End: 2023-02-09

## 2023-02-09 RX ORDER — SODIUM CHLORIDE 9 MG/ML
INJECTION, SOLUTION INTRAVENOUS PRN
Status: DISCONTINUED | OUTPATIENT
Start: 2023-02-09 | End: 2023-02-11 | Stop reason: HOSPADM

## 2023-02-09 RX ORDER — ISOSORBIDE MONONITRATE 60 MG/1
60 TABLET, EXTENDED RELEASE ORAL DAILY
Status: DISCONTINUED | OUTPATIENT
Start: 2023-02-09 | End: 2023-02-11 | Stop reason: HOSPADM

## 2023-02-09 RX ORDER — ASPIRIN 81 MG/1
81 TABLET, CHEWABLE ORAL DAILY
Status: DISCONTINUED | OUTPATIENT
Start: 2023-02-09 | End: 2023-02-09

## 2023-02-09 RX ORDER — ACETAMINOPHEN 650 MG/1
650 SUPPOSITORY RECTAL EVERY 6 HOURS PRN
Status: DISCONTINUED | OUTPATIENT
Start: 2023-02-09 | End: 2023-02-11 | Stop reason: HOSPADM

## 2023-02-09 RX ORDER — SODIUM CHLORIDE 0.9 % (FLUSH) 0.9 %
5-40 SYRINGE (ML) INJECTION PRN
Status: DISCONTINUED | OUTPATIENT
Start: 2023-02-09 | End: 2023-02-11 | Stop reason: HOSPADM

## 2023-02-09 RX ORDER — SODIUM CHLORIDE 0.9 % (FLUSH) 0.9 %
5-40 SYRINGE (ML) INJECTION EVERY 12 HOURS SCHEDULED
Status: DISCONTINUED | OUTPATIENT
Start: 2023-02-09 | End: 2023-02-09 | Stop reason: SDUPTHER

## 2023-02-09 RX ORDER — ONDANSETRON 2 MG/ML
4 INJECTION INTRAMUSCULAR; INTRAVENOUS EVERY 6 HOURS PRN
Status: DISCONTINUED | OUTPATIENT
Start: 2023-02-09 | End: 2023-02-09 | Stop reason: SDUPTHER

## 2023-02-09 RX ORDER — SODIUM CHLORIDE 0.9 % (FLUSH) 0.9 %
5-40 SYRINGE (ML) INJECTION EVERY 12 HOURS SCHEDULED
Status: DISCONTINUED | OUTPATIENT
Start: 2023-02-09 | End: 2023-02-11 | Stop reason: HOSPADM

## 2023-02-09 RX ORDER — SODIUM CHLORIDE 0.9 % (FLUSH) 0.9 %
5-40 SYRINGE (ML) INJECTION PRN
Status: DISCONTINUED | OUTPATIENT
Start: 2023-02-09 | End: 2023-02-09 | Stop reason: SDUPTHER

## 2023-02-09 RX ORDER — ONDANSETRON 2 MG/ML
4 INJECTION INTRAMUSCULAR; INTRAVENOUS EVERY 8 HOURS PRN
Status: DISCONTINUED | OUTPATIENT
Start: 2023-02-09 | End: 2023-02-11 | Stop reason: HOSPADM

## 2023-02-09 RX ORDER — ACETAMINOPHEN 325 MG/1
650 TABLET ORAL EVERY 6 HOURS PRN
Status: DISCONTINUED | OUTPATIENT
Start: 2023-02-09 | End: 2023-02-09

## 2023-02-09 RX ORDER — FAMOTIDINE 20 MG/1
20 TABLET, FILM COATED ORAL 2 TIMES DAILY
COMMUNITY

## 2023-02-09 RX ORDER — ONDANSETRON 4 MG/1
4 TABLET, ORALLY DISINTEGRATING ORAL EVERY 8 HOURS PRN
Status: DISCONTINUED | OUTPATIENT
Start: 2023-02-09 | End: 2023-02-11 | Stop reason: HOSPADM

## 2023-02-09 RX ORDER — HEPARIN SODIUM 10000 [USP'U]/100ML
500 INJECTION, SOLUTION INTRAVENOUS CONTINUOUS
Status: DISCONTINUED | OUTPATIENT
Start: 2023-02-09 | End: 2023-02-10

## 2023-02-09 RX ORDER — MORPHINE SULFATE 4 MG/ML
4 INJECTION, SOLUTION INTRAMUSCULAR; INTRAVENOUS
Status: DISCONTINUED | OUTPATIENT
Start: 2023-02-09 | End: 2023-02-11 | Stop reason: HOSPADM

## 2023-02-09 RX ORDER — ACETAMINOPHEN 325 MG/1
650 TABLET ORAL EVERY 4 HOURS PRN
Status: DISCONTINUED | OUTPATIENT
Start: 2023-02-09 | End: 2023-02-11 | Stop reason: HOSPADM

## 2023-02-09 RX ORDER — AMOXICILLIN AND CLAVULANATE POTASSIUM 875; 125 MG/1; MG/1
1 TABLET, FILM COATED ORAL EVERY 12 HOURS
COMMUNITY
Start: 2023-02-02 | End: 2023-02-12

## 2023-02-09 RX ORDER — POLYETHYLENE GLYCOL 3350 17 G/17G
17 POWDER, FOR SOLUTION ORAL DAILY PRN
Status: DISCONTINUED | OUTPATIENT
Start: 2023-02-09 | End: 2023-02-11 | Stop reason: HOSPADM

## 2023-02-09 RX ORDER — SODIUM CHLORIDE 9 MG/ML
INJECTION, SOLUTION INTRAVENOUS CONTINUOUS
Status: DISCONTINUED | OUTPATIENT
Start: 2023-02-09 | End: 2023-02-11 | Stop reason: HOSPADM

## 2023-02-09 RX ORDER — MORPHINE SULFATE 2 MG/ML
2 INJECTION, SOLUTION INTRAMUSCULAR; INTRAVENOUS
Status: DISCONTINUED | OUTPATIENT
Start: 2023-02-09 | End: 2023-02-11 | Stop reason: HOSPADM

## 2023-02-09 RX ADMIN — LABETALOL HYDROCHLORIDE 300 MG: 200 TABLET, FILM COATED ORAL at 21:21

## 2023-02-09 RX ADMIN — IOPAMIDOL 20 ML: 755 INJECTION, SOLUTION INTRAVENOUS at 16:07

## 2023-02-09 RX ADMIN — MORPHINE SULFATE 4 MG: 4 INJECTION, SOLUTION INTRAMUSCULAR; INTRAVENOUS at 18:06

## 2023-02-09 RX ADMIN — Medication 10 ML: at 21:12

## 2023-02-09 RX ADMIN — ISOSORBIDE MONONITRATE 60 MG: 60 TABLET, EXTENDED RELEASE ORAL at 18:50

## 2023-02-09 RX ADMIN — MORPHINE SULFATE 4 MG: 4 INJECTION, SOLUTION INTRAMUSCULAR; INTRAVENOUS at 21:49

## 2023-02-09 ASSESSMENT — PAIN SCALES - GENERAL
PAINLEVEL_OUTOF10: 6
PAINLEVEL_OUTOF10: 8
PAINLEVEL_OUTOF10: 10

## 2023-02-09 ASSESSMENT — PAIN DESCRIPTION - LOCATION
LOCATION: NECK
LOCATION: NECK

## 2023-02-09 ASSESSMENT — PAIN DESCRIPTION - DESCRIPTORS
DESCRIPTORS: HEAVINESS;OTHER (COMMENT)
DESCRIPTORS: ACHING

## 2023-02-09 ASSESSMENT — PAIN - FUNCTIONAL ASSESSMENT: PAIN_FUNCTIONAL_ASSESSMENT: PREVENTS OR INTERFERES SOME ACTIVE ACTIVITIES AND ADLS

## 2023-02-09 ASSESSMENT — PAIN DESCRIPTION - ORIENTATION
ORIENTATION: RIGHT
ORIENTATION: RIGHT

## 2023-02-09 NOTE — PROGRESS NOTES
Pt arrived to [place] [address] room 1312 from cath lab. Pt arrived on EKOS. Pt immediately hooked up to continuous cardiac monitoring. Report obtained bedside. Medication reviewed and sites for sheaths viewed. Oriented pt to room. Will continue to monitor.   Electronically signed by Christa Martin RN on 2/9/2023 at 4:52 PM

## 2023-02-09 NOTE — PROGRESS NOTES
Rns Ernestine Monae and Urszula took over as bedside nurses. Report received from Genny John. New Rns noticed orders, including heparin order were missing and MD Fiordaliza Navarro for orders. Cath lab RN Myron Coats came to discuss sheaths with bedside RN. EKOS running. Pt c/o pain in R neck where sheath is. MD Damon aware and order for morphine placed.

## 2023-02-10 LAB
ANION GAP SERPL CALCULATED.3IONS-SCNC: 9 MMOL/L (ref 3–16)
APTT: 25 SEC (ref 23–34.3)
APTT: 27.1 SEC (ref 23–34.3)
BUN BLDV-MCNC: 19 MG/DL (ref 7–20)
CALCIUM SERPL-MCNC: 9.3 MG/DL (ref 8.3–10.6)
CHLORIDE BLD-SCNC: 103 MMOL/L (ref 99–110)
CHOLESTEROL, TOTAL: 124 MG/DL (ref 0–199)
CO2: 27 MMOL/L (ref 21–32)
CREAT SERPL-MCNC: 1.1 MG/DL (ref 0.8–1.3)
FIBRINOGEN: 403 MG/DL (ref 207–509)
FIBRINOGEN: 423 MG/DL (ref 207–509)
GFR SERPL CREATININE-BSD FRML MDRD: >60 ML/MIN/{1.73_M2}
GLUCOSE BLD-MCNC: 117 MG/DL (ref 70–99)
HCT VFR BLD CALC: 36 % (ref 40.5–52.5)
HCT VFR BLD CALC: 37.4 % (ref 40.5–52.5)
HCT VFR BLD CALC: 37.5 % (ref 40.5–52.5)
HDLC SERPL-MCNC: 32 MG/DL (ref 40–60)
HEMOGLOBIN: 11.9 G/DL (ref 13.5–17.5)
HEMOGLOBIN: 12 G/DL (ref 13.5–17.5)
HEMOGLOBIN: 12.1 G/DL (ref 13.5–17.5)
LDL CHOLESTEROL CALCULATED: 64 MG/DL
MCH RBC QN AUTO: 29.1 PG (ref 26–34)
MCH RBC QN AUTO: 29.1 PG (ref 26–34)
MCH RBC QN AUTO: 29.9 PG (ref 26–34)
MCHC RBC AUTO-ENTMCNC: 32 G/DL (ref 31–36)
MCHC RBC AUTO-ENTMCNC: 32.4 G/DL (ref 31–36)
MCHC RBC AUTO-ENTMCNC: 33 G/DL (ref 31–36)
MCV RBC AUTO: 89.9 FL (ref 80–100)
MCV RBC AUTO: 90.6 FL (ref 80–100)
MCV RBC AUTO: 90.9 FL (ref 80–100)
PDW BLD-RTO: 15.9 % (ref 12.4–15.4)
PDW BLD-RTO: 16 % (ref 12.4–15.4)
PDW BLD-RTO: 16.4 % (ref 12.4–15.4)
PLATELET # BLD: 139 K/UL (ref 135–450)
PLATELET # BLD: 143 K/UL (ref 135–450)
PLATELET # BLD: 147 K/UL (ref 135–450)
PMV BLD AUTO: 7.3 FL (ref 5–10.5)
PMV BLD AUTO: 7.6 FL (ref 5–10.5)
PMV BLD AUTO: 7.6 FL (ref 5–10.5)
POC ACT LR: 303 SEC
POTASSIUM SERPL-SCNC: 4.5 MMOL/L (ref 3.5–5.1)
RBC # BLD: 3.98 M/UL (ref 4.2–5.9)
RBC # BLD: 4.13 M/UL (ref 4.2–5.9)
RBC # BLD: 4.16 M/UL (ref 4.2–5.9)
SODIUM BLD-SCNC: 139 MMOL/L (ref 136–145)
TRIGL SERPL-MCNC: 138 MG/DL (ref 0–150)
VLDLC SERPL CALC-MCNC: 28 MG/DL
WBC # BLD: 6.9 K/UL (ref 4–11)
WBC # BLD: 7.4 K/UL (ref 4–11)
WBC # BLD: 7.9 K/UL (ref 4–11)

## 2023-02-10 PROCEDURE — 99152 MOD SED SAME PHYS/QHP 5/>YRS: CPT

## 2023-02-10 PROCEDURE — C1887 CATHETER, GUIDING: HCPCS

## 2023-02-10 PROCEDURE — 37214 CESSJ THERAPY CATH REMOVAL: CPT

## 2023-02-10 PROCEDURE — 6360000002 HC RX W HCPCS

## 2023-02-10 PROCEDURE — 2500000003 HC RX 250 WO HCPCS

## 2023-02-10 PROCEDURE — 2709999900 HC NON-CHARGEABLE SUPPLY

## 2023-02-10 PROCEDURE — 37214 CESSJ THERAPY CATH REMOVAL: CPT | Performed by: INTERNAL MEDICINE

## 2023-02-10 PROCEDURE — 85027 COMPLETE CBC AUTOMATED: CPT

## 2023-02-10 PROCEDURE — 2700000000 HC OXYGEN THERAPY PER DAY

## 2023-02-10 PROCEDURE — 6360000002 HC RX W HCPCS: Performed by: INTERNAL MEDICINE

## 2023-02-10 PROCEDURE — B41F1ZZ FLUOROSCOPY OF RIGHT LOWER EXTREMITY ARTERIES USING LOW OSMOLAR CONTRAST: ICD-10-PCS | Performed by: INTERNAL MEDICINE

## 2023-02-10 PROCEDURE — 99153 MOD SED SAME PHYS/QHP EA: CPT

## 2023-02-10 PROCEDURE — 6360000004 HC RX CONTRAST MEDICATION: Performed by: INTERNAL MEDICINE

## 2023-02-10 PROCEDURE — 85384 FIBRINOGEN ACTIVITY: CPT

## 2023-02-10 PROCEDURE — 85730 THROMBOPLASTIN TIME PARTIAL: CPT

## 2023-02-10 PROCEDURE — 2580000003 HC RX 258

## 2023-02-10 PROCEDURE — 94761 N-INVAS EAR/PLS OXIMETRY MLT: CPT

## 2023-02-10 PROCEDURE — 80061 LIPID PANEL: CPT

## 2023-02-10 PROCEDURE — 2100000000 HC CCU R&B

## 2023-02-10 PROCEDURE — C1769 GUIDE WIRE: HCPCS

## 2023-02-10 PROCEDURE — 6370000000 HC RX 637 (ALT 250 FOR IP): Performed by: INTERNAL MEDICINE

## 2023-02-10 PROCEDURE — 80048 BASIC METABOLIC PNL TOTAL CA: CPT

## 2023-02-10 PROCEDURE — 2580000003 HC RX 258: Performed by: INTERNAL MEDICINE

## 2023-02-10 PROCEDURE — 85347 COAGULATION TIME ACTIVATED: CPT

## 2023-02-10 PROCEDURE — 36592 COLLECT BLOOD FROM PICC: CPT

## 2023-02-10 RX ORDER — AMOXICILLIN AND CLAVULANATE POTASSIUM 875; 125 MG/1; MG/1
1 TABLET, FILM COATED ORAL EVERY 12 HOURS SCHEDULED
Status: DISCONTINUED | OUTPATIENT
Start: 2023-02-10 | End: 2023-02-11 | Stop reason: HOSPADM

## 2023-02-10 RX ADMIN — LABETALOL HYDROCHLORIDE 300 MG: 200 TABLET, FILM COATED ORAL at 08:48

## 2023-02-10 RX ADMIN — MORPHINE SULFATE 4 MG: 4 INJECTION, SOLUTION INTRAMUSCULAR; INTRAVENOUS at 04:35

## 2023-02-10 RX ADMIN — AMOXICILLIN AND CLAVULANATE POTASSIUM 1 TABLET: 875; 125 TABLET, FILM COATED ORAL at 16:57

## 2023-02-10 RX ADMIN — MORPHINE SULFATE 2 MG: 2 INJECTION, SOLUTION INTRAMUSCULAR; INTRAVENOUS at 08:59

## 2023-02-10 RX ADMIN — ISOSORBIDE MONONITRATE 60 MG: 60 TABLET, EXTENDED RELEASE ORAL at 08:48

## 2023-02-10 RX ADMIN — Medication 10 ML: at 20:06

## 2023-02-10 RX ADMIN — Medication 10 ML: at 08:49

## 2023-02-10 RX ADMIN — MORPHINE SULFATE 2 MG: 2 INJECTION, SOLUTION INTRAMUSCULAR; INTRAVENOUS at 00:15

## 2023-02-10 RX ADMIN — LABETALOL HYDROCHLORIDE 300 MG: 200 TABLET, FILM COATED ORAL at 20:06

## 2023-02-10 RX ADMIN — IOPAMIDOL 80 ML: 755 INJECTION, SOLUTION INTRAVENOUS at 16:36

## 2023-02-10 RX ADMIN — ALTEPLASE 1 MG/HR: 2.2 INJECTION, POWDER, LYOPHILIZED, FOR SOLUTION INTRAVENOUS at 02:53

## 2023-02-10 RX ADMIN — MORPHINE SULFATE 2 MG: 2 INJECTION, SOLUTION INTRAMUSCULAR; INTRAVENOUS at 12:40

## 2023-02-10 ASSESSMENT — PAIN SCALES - GENERAL
PAINLEVEL_OUTOF10: 3
PAINLEVEL_OUTOF10: 4
PAINLEVEL_OUTOF10: 5
PAINLEVEL_OUTOF10: 5
PAINLEVEL_OUTOF10: 7
PAINLEVEL_OUTOF10: 5
PAINLEVEL_OUTOF10: 0
PAINLEVEL_OUTOF10: 5
PAINLEVEL_OUTOF10: 4
PAINLEVEL_OUTOF10: 7
PAINLEVEL_OUTOF10: 5
PAINLEVEL_OUTOF10: 0
PAINLEVEL_OUTOF10: 0

## 2023-02-10 ASSESSMENT — PAIN DESCRIPTION - LOCATION
LOCATION: HIP;NECK
LOCATION: NECK
LOCATION: HIP
LOCATION: NECK
LOCATION: HIP
LOCATION: NECK
LOCATION: NECK

## 2023-02-10 ASSESSMENT — PAIN DESCRIPTION - DESCRIPTORS
DESCRIPTORS: NUMBNESS
DESCRIPTORS: ACHING
DESCRIPTORS: NAGGING
DESCRIPTORS: ACHING
DESCRIPTORS: ACHING
DESCRIPTORS: HEAVINESS
DESCRIPTORS: ACHING

## 2023-02-10 ASSESSMENT — PAIN DESCRIPTION - ONSET
ONSET: GRADUAL
ONSET: ON-GOING

## 2023-02-10 ASSESSMENT — PAIN DESCRIPTION - ORIENTATION
ORIENTATION: RIGHT

## 2023-02-10 ASSESSMENT — PAIN DESCRIPTION - PAIN TYPE
TYPE: SURGICAL PAIN

## 2023-02-10 ASSESSMENT — PAIN - FUNCTIONAL ASSESSMENT
PAIN_FUNCTIONAL_ASSESSMENT: PREVENTS OR INTERFERES SOME ACTIVE ACTIVITIES AND ADLS
PAIN_FUNCTIONAL_ASSESSMENT: ACTIVITIES ARE NOT PREVENTED
PAIN_FUNCTIONAL_ASSESSMENT: PREVENTS OR INTERFERES SOME ACTIVE ACTIVITIES AND ADLS

## 2023-02-10 ASSESSMENT — PAIN DESCRIPTION - FREQUENCY
FREQUENCY: INTERMITTENT
FREQUENCY: CONTINUOUS

## 2023-02-10 NOTE — PROGRESS NOTES
Pt with dressings to bilat LE dressings.  has been receiving treatment at [facility] and that dressings were changed yesterday. Pt states they wrap his legs in zinc wraps. Dressings not removed at this time do to movement restrictions with sheaths in bilat groins. Wound care consult placed.

## 2023-02-10 NOTE — PROGRESS NOTES
4 Eyes Skin Assessment     NAME:  Francis Shirley  YOB: 1940  MEDICAL RECORD NUMBER:  [id number]    The patient is being assessed for  Shift Handoff    I agree that One RN has performed a thorough Head to Toe Skin Assessment on the patient. ALL assessment sites listed below have been assessed. Areas assessed by both nurses:    Head, Face, Ears, Shoulders, Back, Chest, Arms, Elbows, Hands, Sacrum. Buttock, Coccyx, Ischium, and Legs. Feet and Heels        Does the Patient have a Wound?  Yes, seen by own wound care       Dav Prevention initiated by RN: Yes   Wound Care Orders initiated by RN: Yes    Pressure Injury (Stage 3,4, Unstageable, DTI, NWPT, and Complex wounds) if present, place referral order by RN under : No    New and Established Ostomies, if present place, referral order under : No      Nurse 1 eSignature: Electronically signed by Marissa Arreguin RN on 2/10/23 at 6:34 PM EST    **SHARE this note so that the co-signing nurse can place an eSignature**    Nurse 2 eSignature: Electronically signed by Antonio Junior RN on 2/11/23 at 1:15 AM EST

## 2023-02-10 NOTE — CARE COORDINATION
Wound care consulted for bilateral LE ulcers. Pt currently in CVU on EKOS. Pt with unna boots on to BLE. Unable to assess site due to current dressings. These are weekly dressings placed on Tuesday. Did not remove. Informed bedside RN to ask MD if unna boots to be removed due to perfusion and future procedures. Can stay in place until d/c if approved and can follow up with outpatient wound care. If need to be removed can place adaptic kerlix ace. Will continue to follow.    Electronically signed by Adan Espino RN [address] on 2/10/2023 at 12:32 PM

## 2023-02-10 NOTE — PROGRESS NOTES
2 mg of PRN morphine given for 5/10 neck pain. Patient's head again repositioned. Patient denies any other needs at this time. Appreciative of care. Patients VSS on 2 L nasal cannula. EKOS continues.

## 2023-02-10 NOTE — PROGRESS NOTES
Medication Reconciliation    List of medications patient is currently taking is complete. Source of information: 1. Conversation with patient at bedside                                      2. EPIC records      Allergies  Ciprofloxacin, Cardura [doxazosin], Statins, and Sulfa antibiotics     Notes regarding home medications:   1. Patient is taking Augmentin 875-125 mg 1 tablet BID for 10 days for LE skin ulcers. 2. Patient no longer taking furosemide.  Removed from list

## 2023-02-10 NOTE — PROGRESS NOTES
4 mg of PRN morphine given for neck pain of 8/10. Patient's head repositioned at this time to help with discomfort. Patient denies any other needs. EKOS continues.

## 2023-02-10 NOTE — PROGRESS NOTES
Patient complains of 7/10 hip pain to the right hip after being turned to the left side. Patient states he has arthritis in that hip. Patient assisted with a turn to the other side and 4 mg PRN morphine administered.

## 2023-02-10 NOTE — PLAN OF CARE
Problem: Discharge Planning  Goal: Discharge to home or other facility with appropriate resources  Outcome: Progressing  Flowsheets (Taken 2/10/2023 0800)  Discharge to home or other facility with appropriate resources:   Identify barriers to discharge with patient and caregiver   Arrange for needed discharge resources and transportation as appropriate     Problem: Pain  Goal: Verbalizes/displays adequate comfort level or baseline comfort level  2/10/2023 1518 by Froilan Mcgee RN  Outcome: Progressing  Flowsheets (Taken 2/10/2023 0800)  Verbalizes/displays adequate comfort level or baseline comfort level:   Encourage patient to monitor pain and request assistance   Assess pain using appropriate pain scale  2/10/2023 0643 by Rufus Alvarado RN  Outcome: Progressing     Problem: Skin/Tissue Integrity  Goal: Absence of new skin breakdown  Description: 1. Monitor for areas of redness and/or skin breakdown  2. Assess vascular access sites hourly  3. Every 4-6 hours minimum:  Change oxygen saturation probe site  4. Every 4-6 hours:  If on nasal continuous positive airway pressure, respiratory therapy assess nares and determine need for appliance change or resting period.   2/10/2023 1518 by Froilan Mcgee RN  Outcome: Progressing  2/10/2023 0643 by Rufus Alvarado RN  Outcome: Progressing     Problem: Safety - Adult  Goal: Free from fall injury  2/10/2023 1518 by Froilan Mcgee RN  Outcome: Progressing  2/10/2023 0643 by Rufus Alvarado RN  Outcome: Progressing     Problem: ABCDS Injury Assessment  Goal: Absence of physical injury  2/10/2023 1518 by Froilan Mcgee RN  Outcome: Progressing  Flowsheets (Taken 2/10/2023 1038)  Absence of Physical Injury: Implement safety measures based on patient assessment  2/10/2023 0643 by Rufus Alvarado RN  Outcome: Progressing

## 2023-02-10 NOTE — PROGRESS NOTES
Patient transferred to [facility] [id number] from cath lab. Report given by cath lab nurse to CVU nurse by phone. Bilateral groin sites & R IJ assessed by cath lab & CVU nurse. All sites have gauze & tegaderm dressing, all clean/dry/intact. Pt hooked up to bedside monitor. Pt educated to stay flat in bed for 2 hrs. Family at bedside.

## 2023-02-10 NOTE — PROGRESS NOTES
Patient in bed. O2 saturation dropping to high 80s (87-89) Patient states hx of sleep apnea and uses a CPAP at home. Patient placed on 2 L nasal cannula. Patient has bilateral groin sheaths. Intact and in place. Patient in reverse trendelenburg positioning. Patient aslo has sheath in right neck with EKOS running. Patient repositioned in bed with this RN, Mirtha Slaughter and Hugo Rosa RN. Patient placed on new linens and bed pad. Patient complains of pain at this time of 8/10 in the neck area where the sheath w/ EKOS is. RN to retrieve and administer patient PRN morphine.

## 2023-02-11 VITALS
RESPIRATION RATE: 16 BRPM | WEIGHT: 297.62 LBS | HEIGHT: 70 IN | OXYGEN SATURATION: 95 % | TEMPERATURE: 97.4 F | DIASTOLIC BLOOD PRESSURE: 50 MMHG | HEART RATE: 74 BPM | BODY MASS INDEX: 42.61 KG/M2 | SYSTOLIC BLOOD PRESSURE: 97 MMHG

## 2023-02-11 PROCEDURE — 6370000000 HC RX 637 (ALT 250 FOR IP): Performed by: INTERNAL MEDICINE

## 2023-02-11 PROCEDURE — 99238 HOSP IP/OBS DSCHRG MGMT 30/<: CPT | Performed by: INTERNAL MEDICINE

## 2023-02-11 PROCEDURE — 94760 N-INVAS EAR/PLS OXIMETRY 1: CPT

## 2023-02-11 PROCEDURE — 2580000003 HC RX 258: Performed by: INTERNAL MEDICINE

## 2023-02-11 RX ADMIN — Medication 10 ML: at 08:41

## 2023-02-11 RX ADMIN — AMOXICILLIN AND CLAVULANATE POTASSIUM 1 TABLET: 875; 125 TABLET, FILM COATED ORAL at 08:40

## 2023-02-11 RX ADMIN — LABETALOL HYDROCHLORIDE 300 MG: 200 TABLET, FILM COATED ORAL at 08:41

## 2023-02-11 RX ADMIN — ISOSORBIDE MONONITRATE 60 MG: 60 TABLET, EXTENDED RELEASE ORAL at 08:41

## 2023-02-11 ASSESSMENT — PAIN SCALES - GENERAL
PAINLEVEL_OUTOF10: 0

## 2023-02-11 NOTE — PLAN OF CARE
Problem: Discharge Planning  Goal: Discharge to home or other facility with appropriate resources  Outcome: Progressing     Problem: Pain  Goal: Verbalizes/displays adequate comfort level or baseline comfort level  Outcome: Progressing  Flowsheets (Taken 2/11/2023 1029)  Verbalizes/displays adequate comfort level or baseline comfort level:   Encourage patient to monitor pain and request assistance   Assess pain using appropriate pain scale   Administer analgesics based on type and severity of pain and evaluate response   Implement non-pharmacological measures as appropriate and evaluate response  Note: Pt able to express presence and absence of pain using numerical pain scale. Pt pain is managed by PRN analgesics as ordered by MD. Pain reassess after each interventions. Will continue to monitor as needed. Problem: Skin/Tissue Integrity  Goal: Absence of new skin breakdown  Description: 1. Monitor for areas of redness and/or skin breakdown  2. Assess vascular access sites hourly  3. Every 4-6 hours minimum:  Change oxygen saturation probe site  4. Every 4-6 hours:  If on nasal continuous positive airway pressure, respiratory therapy assess nares and determine need for appliance change or resting period. Outcome: Progressing  Note: Skin assessment performed each shift per protocol. Pt encouraged to reposition often. Will continue to monitor and assess for skin breakdown. Problem: Safety - Adult  Goal: Free from fall injury  Outcome: Progressing  Flowsheets (Taken 2/11/2023 1029)  Free From Fall Injury: Instruct family/caregiver on patient safety  Note: Pt free from falls this shift. Non skid socks provided. Pt educated on use of call light as needed for assistance. Call light always within reach. Pt able and agreeable to contact for safety appropriately.        Problem: ABCDS Injury Assessment  Goal: Absence of physical injury  Outcome: Progressing  Flowsheets (Taken 2/11/2023 1029)  Absence of Physical Injury: Implement safety measures based on patient assessment

## 2023-02-11 NOTE — PROGRESS NOTES
Pt arrived in room from CVICU. Pt alert and oriented x4. Pt denies any pain, nausea, vomiting, and SOB. Pt oriented to room , menu, and call light system. Pt sitting in chair. Call light and item need in reach.  Electronically signed by Real Lewis RN on 2/11/2023 at 9:31 AM

## 2023-02-11 NOTE — PRE SEDATION
Sedation Pre-Procedure Note    Patient Name: Oscar Washington   YOB: 1940  Room/Bed: E6G-6183/1312-01  Medical Record Number: [id number]  Date: 2/10/2023   Time: 10:05 PM       Indication:  Venous ulceration     Consent: I have discussed with the patient and/or the patient representative the indication, alternatives, and the possible risks and/or complications of the planned procedure and the anesthesia methods. The patient and/or patient representative appear to understand and agree to proceed. Vital Signs:   Vitals:    02/10/23 1900   BP: (!) 125/57   Pulse: 78   Resp: 16   Temp:    SpO2: 92%       Past Medical History:   has a past medical history of Anesthesia, Arthritis, Atrial fibrillation ([address] CAD (coronary artery disease), Cancer ([facility] DVT (deep venous thrombosis) ([facility] [address] Gout, Hx of blood clots, Hyperlipemia, and Hypertension. Past Surgical History:   has a past surgical history that includes tumor removal (Left); Cholecystectomy; Prostate surgery; Vena Cava Filter Placement; hernia repair (Left); Nasal septum surgery; Cardiac surgery; Cardiac catheterization (2011); Cataract removal (Bilateral); back surgery; Pressure ulcer debridement; joint replacement (2009); Colonoscopy; and Kidney surgery. Medications:   Scheduled Meds:    amoxicillin-clavulanate  1 tablet Oral 2 times per day    sodium chloride flush  5-40 mL IntraVENous 2 times per day    labetalol  300 mg Oral BID    isosorbide mononitrate  60 mg Oral Daily     Continuous Infusions:    sodium chloride      sodium chloride 35 mL/hr at 02/10/23 7851     PRN Meds: ondansetron **OR** [DISCONTINUED] ondansetron, [DISCONTINUED] acetaminophen **OR** acetaminophen, polyethylene glycol, sodium chloride flush, sodium chloride, acetaminophen, morphine **OR** morphine, ondansetron  Home Meds:   Prior to Admission medications    Medication Sig Start Date End Date Taking?  Authorizing Provider   famotidine (PEPCID) 20 MG tablet Take 20 mg by mouth 2 times daily   Yes Historical Provider, MD   amoxicillin-clavulanate (AUGMENTIN) 875-125 MG per tablet Take 1 tablet by mouth every 12 hours 2/2/23 2/12/23  Historical Provider, MD   chlorthalidone (HYGROTON) 25 MG tablet Take 1 tablet by mouth daily 1/31/23   Vivian North MD   isosorbide mononitrate (IMDUR) 60 MG extended release tablet Take 1 tablet by mouth daily 1/31/23   Vivian North MD   rosuvastatin (CRESTOR) 5 MG tablet TAKE 1 TABLET BY MOUTH TWICE WEEKLY 1/27/23   Vivian North MD   magnesium oxide (MAG-OX) 400 MG tablet Take 400 mg by mouth daily 10/12/22   Vivian North MD   valsartan (DIOVAN) 160 MG tablet TAKE 1 TABLET BY MOUTH TWICE DAILY 10/12/22   Vivian North MD   clopidogrel (PLAVIX) 75 MG tablet TAKE 1 TABLET BY MOUTH EVERY DAY 6/30/22   Vivian North MD   spironolactone (ALDACTONE) 25 MG tablet TAKE 1 TABLET BY MOUTH EVERY DAY  Patient taking differently: 50 mg TAKE 1 TABLET BY MOUTH EVERY DAY 6/30/22   Vivian North MD   zinc gluconate 50 MG tablet Take 50 mg by mouth daily    Historical Provider, MD   labetalol (NORMODYNE) 300 MG tablet Take 1 tablet by mouth 2 times daily 8/27/21   Manfred Manley MD   Ascorbic Acid (VITAMIN C) 250 MG tablet Take 1,000 mg by mouth daily     Historical Provider, MD   aspirin 81 MG chewable tablet Take 1 tablet by mouth daily 11/19/20   Vivian North MD   b complex vitamins capsule Take 1 capsule by mouth daily    Historical Provider, MD   Cholecalciferol (VITAMIN D3) 2000 UNITS CAPS Take 1 capsule by mouth daily     Historical Provider, MD   nitroGLYCERIN (NITROSTAT) 0.4 MG SL tablet Place 1 tablet under the tongue every 5 minutes as needed for Chest pain 4/7/16   Vivian North MD     Coumadin Use Last 7 Days:  no  Antiplatelet drug therapy use last 7 days: no  Other anticoagulant use last 7 days: yes -  Additional Medication Information:  n/a      Pre-Sedation Documentation and Exam:   I have personally completed a history, physical exam & review of systems for this patient (see notes).     Mallampati Airway Assessment:  Mallampati Class I - (soft palate, fauces, uvula & anterior/posterior tonsillar pillars are visible)    Prior History of Anesthesia Complications:   none    ASA Classification:  Class 3 - A patient with severe systemic disease that limits activity but is not incapacitating    Sedation/ Anesthesia Plan:   intravenous sedation    Medications Planned:   midazolam (Versed) intravenously    Patient is an appropriate candidate for plan of sedation: yes    Electronically signed by Miguel Tim MD on 2/10/2023 at 10:05 PM

## 2023-02-11 NOTE — OP NOTE
[address]   Procedure Note    CLINICAL HISTORY:       Evangelina Peña is a 81 y.o. male with a history of IVC filter placement and occluded iliac veins    Patient Active Problem List   Diagnosis    Essential hypertension, benign    Irregular heartbeat    CAD (coronary artery disease)    Hypercholesteremia    Myalgia    PAF (paroxysmal atrial fibrillation) (HCC)    Acute chest pain    Unstable angina (HCC)    CAD in native artery    Hyperlipemia    DVT femoral (deep venous thrombosis) with thrombophlebitis, left (HCC)           The risks, benefits, and details of the procedure were explained to the patient. The patient verbalized understanding and wanted to proceed. Informed written consent was obtained. INDICATION:  CEAP6 venous ulcerations    PROCEDURES PERFORMED:   EKOS catheter removal    PROCEDURE TECHNIQUE:  The patient was approached from the previously placed IJ and bilateral common femoral sheaths     CONTRAST:  Total of 80 cc. COMPLICATIONS:  None.      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 Yelena Del Cid MD [address], Interventional Cardiology, and Peripheral Vascular Disease   [facility]   (C): [phone]  [facility] [phone]

## 2023-02-11 NOTE — H&P
H&P Update - see note from 23    I have reviewed the history and physical and examined the patient and find no relevant changes. I have reviewed with the patient and/or family the risks, benefits, and alternatives to the procedure. Pre-sedation Assessment    Patient:  Naty Sanchez   :   1940  Intended Procedure: venogram/ivc filter removal    Vitals:    02/10/23 1900   BP: (!) 125/57   Pulse: 78   Resp: 16   Temp:    SpO2: 92%       Nursing notes reviewed and agreed. Medications reviewed  Allergies:    Allergies   Allergen Reactions    Ciprofloxacin Palpitations    Cardura [Doxazosin] Other (See Comments)     TACHYCARDIA    Statins Other (See Comments)     Muscle aches    Sulfa Antibiotics Diarrhea and Other (See Comments)     Lower extremity weakness         Pre-Procedure Assessment/Plan:  ASA 2 - Patient with mild systemic disease with no functional limitations    Mallampati Airway Assessment:  Mallampati Class I - (soft palate, fauces, uvula & anterior/posterior tonsillar pillars are visible)    Level of Sedation Plan:Mild sedation    Post Procedure plan: Return to same level of care    Giovani East MD [address] [facility], Interventional Cardiology, and Peripheral Vascular [address] [facility]   (C): [phone]  (O): [phone]

## 2023-02-11 NOTE — DISCHARGE SUMMARY
[address] [place] [address] SUMMARY      Patient ID:  Deloise Najjar  [id number] 81 y.o. 1940    Admit date: 2/9/2023    Discharge date:      Admitting Physician: Noé Fulton MD     Discharge Physician: Noé Fulton MD     Admission Diagnoses: DVT femoral (deep venous thrombosis) with thrombophlebitis, left [facility]) [I82.412]    Discharge Diagnoses:   Patient Active Problem List   Diagnosis    Essential hypertension, benign    Irregular heartbeat    CAD (coronary artery disease)    Hypercholesteremia    Myalgia    PAF (paroxysmal atrial fibrillation) ([facility])    Acute chest pain    Unstable angina ([facility]    CAD in native artery    Hyperlipemia    DVT femoral (deep venous thrombosis) with thrombophlebitis, left ([facility] [address]        Discharged Condition: good    Hospital Course: Deloise Najjar was admitted severe lower extremity edema for IVC filter extraction and iliac vein reconstruction he is status post EKOS catheter placement and balloon angioplasty    Consults: none    Significant Diagnostic Studies:   Labs:   Lab Results   Component Value Date    CREATININE 1.1 02/10/2023    BUN 19 02/10/2023     02/10/2023    K 4.5 02/10/2023     02/10/2023    CO2 27 02/10/2023      Lab Results   Component Value Date    WBC 7.4 02/10/2023    HGB 12.0 (L) 02/10/2023    HCT 37.5 (L) 02/10/2023    MCV 90.9 02/10/2023     02/10/2023      Lab Results   Component Value Date    INR 1.06 01/27/2019    PROTIME 12.1 01/27/2019    No results found for: BNP      Disposition: home    Patient Instructions:      Medication List        CONTINUE taking these medications      amoxicillin-clavulanate 875-125 MG per tablet  Commonly known as: AUGMENTIN     aspirin 81 MG chewable tablet  Take 1 tablet by mouth daily     b complex vitamins capsule     chlorthalidone 25 MG tablet  Commonly known as: HYGROTON  Take 1 tablet by mouth daily     clopidogrel 75 MG tablet  Commonly known as: PLAVIX  TAKE 1 TABLET BY MOUTH EVERY DAY     famotidine 20 MG tablet  Commonly known as: PEPCID     isosorbide mononitrate 60 MG extended release tablet  Commonly known as: IMDUR  Take 1 tablet by mouth daily     labetalol 300 MG tablet  Commonly known as: NORMODYNE  Take 1 tablet by mouth 2 times daily     magnesium oxide 400 MG tablet  Commonly known as: MAG-OX     nitroGLYCERIN 0.4 MG SL tablet  Commonly known as: NITROSTAT  Place 1 tablet under the tongue every 5 minutes as needed for Chest pain     rosuvastatin 5 MG tablet  Commonly known as: CRESTOR  TAKE 1 TABLET BY MOUTH TWICE WEEKLY     vitamin C 250 MG tablet     Vitamin D3 50 MCG (2000 UT) Caps     zinc gluconate 50 MG tablet            STOP taking these medications      doxycycline hyclate 100 MG capsule  Commonly known as: VIBRAMYCIN     spironolactone 25 MG tablet  Commonly known as: ALDACTONE     valsartan 160 MG tablet  Commonly known as: DIOVAN              Follow-up with Dr. Wendy Aguilar in 4 weeks. Greater than 30 minutes was spent in discharge coordination. Signed:   Jose Armando Vogel MD, 2/11/2023, 2:34 PM

## 2023-02-11 NOTE — FLOWSHEET NOTE
02/11/23 0800   Vitals   Temp 97.8 °F (36.6 °C)   Temp Source Oral   Heart Rate 82   Heart Rate Source Monitor   /65   MAP (Calculated) 89   MAP (mmHg) 83   BP Location Left upper arm   BP Upper/Lower Upper   BP Method Automatic   Patient Position Semi fowlers   Level of Consciousness 0   Cardiac Rhythm Sinus rhythm   Pain Assessment   Pain Assessment None - Denies Pain   Oxygen Therapy   SpO2 94 %   Pulse Oximetry Type Continuous   Pulse via Oximetry 82 beats per minute   Pulse Oximeter Device Mode Continuous   Pulse Oximeter Device Location Finger   O2 Device None (Room air)

## 2023-02-11 NOTE — PROGRESS NOTES
All verbal and written discharge instructions given to the pt and patient family at discharge regarding new meds, changes on home meds and follow up appointment. Pt verbalized understandings and denies other needs at discharge.  Electronically signed by Dipesh Alexis RN on 2/11/2023 at 3:14 PM

## 2023-02-11 NOTE — PROGRESS NOTES
4 Eyes Admission Assessment     I agree as the admission nurse that 2 RN's have performed a thorough Head to Toe Skin Assessment on the patient. ALL assessment sites listed below have been assessed on admission. Areas assessed by both nurses:   [x]   Head, Face, and Ears   [x]   Shoulders, Back, and Chest  [x]   Arms, Elbows, and Hands   [x]   Coccyx, Sacrum, and Ischium  [x]   Legs, Feet, and Heels        Does the Patient have Skin Breakdown?   No         Dav Prevention initiated:  No   Wound Care Orders initiated:  No      [facility] nurse consulted for Pressure Injury (Stage 3,4, Unstageable, DTI, NWPT, and Complex wounds) or Dav score 18 or lower:  No      Nurse 1 eSignature: Electronically signed by Garrett Lerner RN on 2/11/23 at 6:33 AM EST    **SHARE this note so that the co-signing nurse is able to place an eSignature**    Nurse 2 eSignature: {Esignature:[id number]}

## 2023-02-11 NOTE — PROGRESS NOTES
PM assessment complete, VSS at this time. Pt up to BR with SBA for a BM. Pt tolerated well, and asked to sit in a chair. Pt tolerated well. POC discussed, all questions answered.

## 2023-02-11 NOTE — OP NOTE
[address]   Procedure Note    CLINICAL HISTORY:       Jair Wright is a 81 y.o. male with a history of a trapease IVC filter placement roughly 15 years ago, with a CT venogram that showed bilateral iliac vein obstruction, with severe bilateral lower extremity edema and worsening venous ulcerations    Patient Active Problem List   Diagnosis    Essential hypertension, benign    Irregular heartbeat    CAD (coronary artery disease)    Hypercholesteremia    Myalgia    PAF (paroxysmal atrial fibrillation) ([facility])    Acute chest pain    Unstable angina (HCC)    CAD in native artery    Hyperlipemia    DVT femoral (deep venous thrombosis) with thrombophlebitis, left ([facility])       Current Facility-Administered Medications   Medication Dose Route Frequency Provider Last Rate Last Admin    amoxicillin-clavulanate (AUGMENTIN) 875-125 MG per tablet 1 tablet  1 tablet Oral 2 times per day Romie Mercedes MD   1 tablet at 02/10/23 1657    ondansetron (ZOFRAN-ODT) disintegrating tablet 4 mg  4 mg Oral Q8H PRN Romie Mercedes MD        acetaminophen (TYLENOL) suppository 650 mg  650 mg Rectal Q6H PRN Romie Mercedes MD        polyethylene glycol (GLYCOLAX) packet 17 g  17 g Oral Daily PRN Romie Mercedes MD        sodium chloride flush 0.9 % injection 5-40 mL  5-40 mL IntraVENous 2 times per day Romie Mercedes MD   10 mL at 02/10/23 2006    sodium chloride flush 0.9 % injection 5-40 mL  5-40 mL IntraVENous PRN Romie Mercedes MD        0.9 % sodium chloride infusion   IntraVENous PRN Romie Mercedes MD        acetaminophen (TYLENOL) tablet 650 mg  650 mg Oral Q4H PRN Romie Mercedes MD        morphine (PF) injection 2 mg  2 mg IntraVENous Q2H PRN Romie Mercedes MD   2 mg at 02/10/23 1240    Or    morphine (PF) injection 4 mg  4 mg IntraVENous Q2H PRN oRmie Mercedes MD   4 mg at 02/10/23 0435    ondansetron (ZOFRAN) injection 4 mg  4 mg IntraVENous Q8H PRN Romie Mercedes MD        labetalol (NORMODYNE) tablet 300 mg  300 mg Oral BID Lucien Garcia MD   300 mg at 02/10/23 2006    isosorbide mononitrate (IMDUR) extended release tablet 60 mg  60 mg Oral Daily Lucien Garcia MD   60 mg at 02/10/23 0848    0.9 % sodium chloride infusion   IntraVENous Continuous Lucien Garcia MD 35 mL/hr at 02/10/23 2646 Rate Verify at 02/10/23 0282         The risks, benefits, and details of the procedure were explained to the patient. The patient verbalized understanding and wanted to proceed. Informed written consent was obtained. INDICATION:  CEAP6 venous ulcerations    PROCEDURES PERFORMED:   Bilateral iliac vein angiogram  IVC angioplasty  EKOS thrombolytic catheter placement      PROCEDURE TECHNIQUE:  The patient was approached from the bilateral common femoral veins using ultrasound and micro access and the right internal jugular vein using ultrasound and micro access     CONTRAST:  Total of 40 cc. COMPLICATIONS:  None.       EBL: 50 cc    VENOGRAM:       The right and left external, internal and  common liac vein are completely occluded  The IVC filter is completely occluded      INTERVENTION  The right internal jugular vein was serially dilated and a 22 [place] sheath was positioned  into the inferior vena cava just above the IVC filter  Multiple attempts made to cross the IVC filter from above using both a 35 Glidewire advantage, straight Glidewire, and 0.0 18  wires, a subsequent channel was made from the IVC connected into the right external iliac vein, angioplasty was performed with an 8.0 x 200 mm balloon  Multiple attempts were then made to cross the IVC from below using both the left and right common femoral vein access sites using microcatheters and multiple 035 wires the ability to cross from below was unsuccessful  Decision was made to place an EKOS on the lytic catheter through the channel created from above with the help of creating additional channels to cross to the IVC filter    SUMMARY:   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

## 2023-02-11 NOTE — CARE COORDINATION
CASE MANAGEMENT DISCHARGE SUMMARY:    DISCHARGE DATE: 2/11/23    DISCHARGED TO HOME     TRANSPORTATION: spouse, pt reports he is an active  as well     PREFERRED PHARMACY: [facility]            Spoke with pt who reports no dc needs or concerns,  Pt is independent at home  Electronically signed by [id number] ERIC Parry on 2/11/2023 at 2:39 PM

## 2023-02-17 ENCOUNTER — HOSPITAL ENCOUNTER (OUTPATIENT)
Dept: INFUSION THERAPY | Age: 83
Setting detail: INFUSION SERIES
Discharge: HOME | End: 2023-02-17
Payer: MEDICARE

## 2023-02-17 VITALS
HEART RATE: 71 BPM | TEMPERATURE: 98 F | DIASTOLIC BLOOD PRESSURE: 79 MMHG | SYSTOLIC BLOOD PRESSURE: 124 MMHG | OXYGEN SATURATION: 97 % | RESPIRATION RATE: 16 BRPM

## 2023-02-17 DIAGNOSIS — I25.119 CORONARY ARTERY DISEASE INVOLVING NATIVE CORONARY ARTERY OF NATIVE HEART WITH ANGINA PECTORIS (HCC): ICD-10-CM

## 2023-02-17 DIAGNOSIS — I25.10 CAD IN NATIVE ARTERY: ICD-10-CM

## 2023-02-17 DIAGNOSIS — E78.5 HYPERLIPIDEMIA, UNSPECIFIED HYPERLIPIDEMIA TYPE: Primary | ICD-10-CM

## 2023-02-17 DIAGNOSIS — E78.00 HYPERCHOLESTEREMIA: ICD-10-CM

## 2023-02-17 PROCEDURE — 96372 THER/PROPH/DIAG INJ SC/IM: CPT

## 2023-02-17 PROCEDURE — 6360000002 HC RX W HCPCS: Performed by: INTERNAL MEDICINE

## 2023-02-17 RX ORDER — EPINEPHRINE 1 MG/ML
0.3 INJECTION, SOLUTION, CONCENTRATE INTRAVENOUS PRN
OUTPATIENT
Start: 2023-05-18

## 2023-02-17 RX ORDER — ALBUTEROL SULFATE 90 UG/1
4 AEROSOL, METERED RESPIRATORY (INHALATION) PRN
OUTPATIENT
Start: 2023-05-18

## 2023-02-17 RX ORDER — ONDANSETRON 2 MG/ML
8 INJECTION INTRAMUSCULAR; INTRAVENOUS
OUTPATIENT
Start: 2023-05-18

## 2023-02-17 RX ORDER — SODIUM CHLORIDE 9 MG/ML
INJECTION, SOLUTION INTRAVENOUS CONTINUOUS
OUTPATIENT
Start: 2023-05-18

## 2023-02-17 RX ORDER — ACETAMINOPHEN 325 MG/1
650 TABLET ORAL
OUTPATIENT
Start: 2023-05-18

## 2023-02-17 RX ORDER — DIPHENHYDRAMINE HYDROCHLORIDE 50 MG/ML
50 INJECTION INTRAMUSCULAR; INTRAVENOUS
OUTPATIENT
Start: 2023-05-18

## 2023-02-17 RX ADMIN — INCLISIRAN 284 MG: 284 INJECTION, SOLUTION SUBCUTANEOUS at 14:12

## 2023-02-17 NOTE — PROGRESS NOTES
Outpatient [address] ([address] [place] [address]) VISIT    NAME:  Adrianne Betancourt OF BIRTH:  1940  MEDICAL RECORD NUMBER:  [id number]  Episode Date:  2/17/2023    Patient [facility] to [facility]    [x] per wheelchair   [] ambulatory     Alert and oriented x 4. Has a hx of cardiac stents x 9, IVC filter. Recently hospitalized due to IVC clotted. States total cholesterol and LDL good but needs to bring up HDL. /79   Pulse 71   Temp 98 °F (36.7 °C) (Oral)   Resp 16   SpO2 97%     CLINICAL INFORMATION  Treatment Start Date:2/17/23    ICD-10-CM Primary Diagnosis Code:   [] E78.0 Pure Hypercholesterolemia (including HeFH)   [] E78.2 Mixed Hyperlipidemia   []  E78.4 Other Hyperlipidemia   [x] E78.5 Hyperlipidemia,    [] Unspecified Other:  ICD-10-CM Secondary Diagnosis Code:CAD I25.10    Has patient been diagnosed with ASCVD and/or HeFH, is currently  receiving maximally tolerated statin therapy (or has been determined  clinically intolerant), and has not reached LDL-C target (<70 mg/dL)?  Except LDL= 64   [x] Yes     [] No      Patient Active Problem List   Diagnosis    Essential hypertension, benign    Irregular heartbeat    CAD (coronary artery disease)    Hypercholesteremia    Myalgia    PAF (paroxysmal atrial fibrillation) (HCC)    Acute chest pain    Unstable angina (HCC)    CAD in native artery    Hyperlipemia    DVT femoral (deep venous thrombosis) with thrombophlebitis, left ([address]       PAST MEDICAL HISTORY        Diagnosis Date    Anesthesia     hallucinations for prostate surgery     Arthritis     Atrial fibrillation (HCC)     CAD (coronary artery disease)     Cancer ([address]     Kidney    DVT (deep venous thrombosis) (HCC)     CORNELIUS legs post prostate surgery 5 yrs ago    Gout     Hx of blood clots     Hyperlipemia 2/2/2023    Hypertension        Most recent LDL-C= 64    What Cholesterol Lowering Medication is patient currently on: Rosuvastatin    Leqvio works in conjuction with diet and exercise. Is patient trying to avoid foods high in fat like beef, pork, butter, cheese, whole milk, fried foods and baked goods? Yes    Is patient trying to exercise more? Patient unable due to leg wound, pain and generalized weakness. Has patient experienced any side effects:N/A today is first dose.    [] Injection site reaction like pain, redness or rash   [] Joint Pain, arthralgia   [] Urinary Tract Infection   [] Diarrhea   [] Chest Cold, Bronchitis   [] Pain in legs or arms   [] SOB                    Response to treatment:  Well tolerated by patient. Patient to follow up with Dr. Yenni Arriaza.  Next Leqvio appointment scheduled for 5/18/23    Electronically signed by Janes Martin RN on 2/17/2023 at 2:08 PM

## 2023-02-17 NOTE — DISCHARGE INSTRUCTIONS
Outpatient Infusion Discharge Instructions  [address] [address] [address]  Telephone: [phone]    NAME:  Kayli Vela OF BIRTH:  1940  MEDICAL RECORD NUMBER:  [id number]  DATE:  2/17/23    Reason for Outpatient Infusion Visit: Lavaun Blocker    If you develop any these symptoms please contact you Doctor    [] Nausea and/or vomiting not relieved with medication   [x] Swelling, redness, and/or bleeding at injection or IV site    [] Fever or chills  [x] Rash or itching   [] Shortness of breath  [x] Please review After Visit Summary (AVS) information on Leqvio, hyperlipidemia   [] Other      Outpatient [address]: Should you experience any significant changes in your health or have questions about your care please contact the [address] at [address] 8:00 am - 4:00 pm.  If you need help outside these hours and cannot wait until we are again available, contact your Primary Care Physician or go to the hospital emergency room.        Electronically signed by Ladarius Foster RN on 2/17/2023 at 2:06 PM

## 2023-02-22 RX ORDER — ROSUVASTATIN CALCIUM 5 MG/1
TABLET, COATED ORAL
Qty: 24 TABLET | Refills: 2 | OUTPATIENT
Start: 2023-02-22

## 2023-03-06 ENCOUNTER — OFFICE VISIT (OUTPATIENT)
Dept: CARDIOLOGY CLINIC | Age: 83
End: 2023-03-06

## 2023-03-06 VITALS
HEART RATE: 71 BPM | WEIGHT: 289 LBS | SYSTOLIC BLOOD PRESSURE: 150 MMHG | DIASTOLIC BLOOD PRESSURE: 65 MMHG | HEIGHT: 70 IN | BODY MASS INDEX: 41.37 KG/M2 | OXYGEN SATURATION: 96 %

## 2023-03-06 DIAGNOSIS — Z86.718 HISTORY OF DVT (DEEP VEIN THROMBOSIS): ICD-10-CM

## 2023-03-06 DIAGNOSIS — I48.0 PAF (PAROXYSMAL ATRIAL FIBRILLATION) (HCC): ICD-10-CM

## 2023-03-06 DIAGNOSIS — I25.10 CAD IN NATIVE ARTERY: Primary | ICD-10-CM

## 2023-03-06 DIAGNOSIS — I25.10 CORONARY ARTERY DISEASE INVOLVING NATIVE CORONARY ARTERY OF NATIVE HEART WITHOUT ANGINA PECTORIS: ICD-10-CM

## 2023-03-06 DIAGNOSIS — I82.5Z3 CHRONIC DEEP VEIN THROMBOSIS (DVT) OF DISTAL VEIN OF BOTH LOWER EXTREMITIES (HCC): ICD-10-CM

## 2023-03-06 DIAGNOSIS — R60.0 BILATERAL LOWER EXTREMITY EDEMA: ICD-10-CM

## 2023-03-06 NOTE — PROGRESS NOTES
Interventional Cardiology Consultation     Jacky [birth year]    PCP: Venessa Catalan MD  Referring Physician: Dr Chloé Reid  Reason for Referral: IVC filter removal  Chief Complaint: \"  Chief Complaint   Patient presents with    Follow-up     IVC filter removal      Subjective:     History of Present Illness: The patient is 81 y.o. male with a past medical history significant for coronary artery disease, venous insufficiency, atrial fibrillation, DVT s/p IVC filter, hypertension, and hyperlipidemia. He was referred by Dr. Itzel Murcia to discuss IVC filter removal. He had increased swelling in both legs with recurrent cellulitis. He keeps his legs wrapped and wears compression stockings as much possible. 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 There is a robust channel from the external iliac on the right to the IVC likely a large collateral and unsuccessful attempt at removing IVC filter. Today, he states overall he is doing well and notes improvement in the edema, discoloration and pain in his leg. He denies any new cardiac complaints. He denies any chest pains or worsening shortness of breath. He reports compliance with his medications and tolerating. He denies any abnormal bleeding or bruising. Patient denies exertional chest pain/pressure, dyspnea at rest, FINLEY, PND, orthopnea, palpitations, lightheadedness, weight changes, changes in LE edema, and syncope.     Past Medical History:   Diagnosis Date    Anesthesia     hallucinations for prostate surgery     Arthritis     Atrial fibrillation ([address]     CAD (coronary artery disease)     Cancer ([address]     Kidney    DVT (deep venous thrombosis) (HCC)     CORNELIUS legs post prostate surgery 5 yrs ago    Gout     Hx of blood clots     Hyperlipemia 2/2/2023    Hypertension      Past Surgical History:   Procedure Laterality Date    BACK SURGERY      3 surgeries total.  Lumbar 3&4,    CARDIAC CATHETERIZATION      CARDIAC SURGERY      stents placed- 1 in  and 2 in .  3 surgeries total    CATARACT REMOVAL Bilateral     CHOLECYSTECTOMY      COLONOSCOPY      HERNIA REPAIR Left      X2.  Left flank and right inguinal    JOINT REPLACEMENT  2009    right knee replacement    KIDNEY SURGERY      tumor removed     NASAL SEPTUM SURGERY      PRESSURE ULCER DEBRIDEMENT      had a stage 4 wound on coccyx 6 years ago    PROSTATE SURGERY      2012-prostate removal due to enlargement (not cancerous)    TUMOR REMOVAL Left     kidney    VENA CAVA FILTER PLACEMENT      5 yrs ago     Family History   Problem Relation Age of Onset    Other Mother     Cancer Mother         ovarian    Other Father      Social History     Tobacco Use    Smoking status: Former     Packs/day: 0.50     Years: 6.00     Pack years: 3.00     Types: Cigarettes     Quit date: 1966     Years since quittin.6    Smokeless tobacco: Never   Vaping Use    Vaping Use: Never used   Substance Use Topics    Alcohol use: No    Drug use: No     Allergies   Allergen Reactions    Ciprofloxacin Palpitations    Cardura [Doxazosin] Other (See Comments)     TACHYCARDIA    Statins Other (See Comments)     Muscle aches    Sulfa Antibiotics Diarrhea and Other (See Comments)     Lower extremity weakness     Current Outpatient Medications   Medication Sig Dispense Refill    famotidine (PEPCID) 20 MG tablet Take 20 mg by mouth 2 times daily      chlorthalidone (HYGROTON) 25 MG tablet Take 1 tablet by mouth daily 90 tablet 3    isosorbide mononitrate (IMDUR) 60 MG extended release tablet Take 1 tablet by mouth daily 90 tablet 3    rosuvastatin (CRESTOR) 5 MG tablet TAKE 1 TABLET BY MOUTH TWICE WEEKLY 15 tablet 3    magnesium oxide (MAG-OX) 400 MG tablet Take 400 mg by mouth daily 60 tablet 1    clopidogrel (PLAVIX) 75 MG tablet TAKE 1 TABLET BY MOUTH EVERY DAY 90 tablet 3    zinc gluconate 50 MG tablet Take 50 mg by mouth daily       labetalol (NORMODYNE) 300 MG tablet Take 1 tablet by mouth 2 times daily 180 tablet 3    Ascorbic Acid (VITAMIN C) 250 MG tablet Take 1,000 mg by mouth daily       aspirin 81 MG chewable tablet Take 1 tablet by mouth daily 30 tablet 3    b complex vitamins capsule Take 1 capsule by mouth daily      Cholecalciferol (VITAMIN D3) 2000 UNITS CAPS Take 1 capsule by mouth daily       nitroGLYCERIN (NITROSTAT) 0.4 MG SL tablet Place 1 tablet under the tongue every 5 minutes as needed for Chest pain 25 tablet 3     No current facility-administered medications for this visit. Review of Systems:  Constitutional: No unanticipated weight loss. There's been no change in energy level, sleep pattern, or activity level. No fevers, chills. Eyes: No visual changes or diplopia. No scleral icterus. ENT: No Headaches, hearing loss or vertigo. No mouth sores or sore throat. Cardiovascular: as reviewed in HPI  Respiratory: No cough or wheezing, no sputum production. No hemoptysis. Gastrointestinal: No abdominal pain, appetite loss, blood in stools. No change in bowel or bladder habits. Genitourinary: No dysuria, trouble voiding, or hematuria. Musculoskeletal:  No gait disturbance, no joint complaints. Integumentary: No rash or pruritis. Neurological: No headache, diplopia, change in muscle strength, numbness or tingling. Psychiatric: No anxiety or depression. Endocrine: No temperature intolerance. No excessive thirst, fluid intake, or urination. No tremor. Hematologic/Lymphatic: No abnormal bruising or bleeding, blood clots or swollen lymph nodes. Allergic/Immunologic: No nasal congestion or hives. Physical Exam:   BP (!) 150/65   Pulse 71   Ht 5' 10\" (1.778 m)   Wt 289 lb (131.1 kg)   SpO2 96%   BMI 41.47 kg/m²   Wt Readings from Last 3 Encounters:   03/06/23 289 lb (131.1 kg)   02/11/23 297 lb 9.9 oz (135 kg)   01/31/23 292 lb (132.5 kg)     Constitutional: He is oriented to person, place, and time.  He appears well-developed and well-nourished. In no acute distress. Head: Normocephalic and atraumatic. Pupils equal and round. Neck: Neck supple. No JVP or carotid bruit appreciated. No mass and no thyromegaly present. No lymphadenopathy present. Cardiovascular: Normal rate. Normal heart sounds. Exam reveals no gallop and no friction rub. No murmur heard. Pulmonary/Chest: Effort normal and breath sounds normal. No respiratory distress. He has no wheezes, rhonchi or rales. Abdominal: Soft, non-tender. Bowel sounds are normal. He exhibits no organomegaly, mass or bruit. Extremities: Trace bilateral pitting edema, no erythema with multiple areas of venous ulcers  Neurological: No gross cranial nerve deficit. Coordination normal.   Skin: Skin is warm and dry. There is no rash or diaphoresis. Psychiatric: He has a normal mood and affect. His speech is normal and behavior is normal.     Lab Review:   FLP:    Lab Results   Component Value Date/Time    TRIG 138 02/10/2023 08:45 AM    HDL 32 02/10/2023 08:45 AM    HDL 34 07/20/2011 03:34 PM    LDLCALC 64 02/10/2023 08:45 AM    LDLDIRECT 126 11/16/2020 04:02 PM    LABVLDL 28 02/10/2023 08:45 AM     BUN/Creatinine:    Lab Results   Component Value Date/Time    BUN 19 02/10/2023 05:10 AM    CREATININE 1.1 02/10/2023 05:10 AM     PT/INR, TNI, HGB A1C:   Lab Results   Component Value Date/Time    TROPONINI <0.01 01/28/2019 02:28 AM    LABA1C 6.7 (H) 07/20/2011 03:34 PM      No results found for: CBCAUTODIF    Cardiac cath 11/19/2020  1. High-grade ostial LAD narrowing in the 80% range; distal LAD has the  two tandem lesions in the 80% range. 2.  Normal left ventricular size and systolic function. 3.  Normal hemodynamics. Brachial arteriogram was performed and shows widely patent brachial artery. INTERVENTIONAL PROCEDURE CATHETERS USED:  EBU3.5 guide, Runthrough wire,a 2.75 x 15 for the distal LAD and a 3.5 x 12 for the ostial LAD.     Cardiac cath 1/30/19  CORONARY ANGIOGRAM:  1. There is single vessel disease in this right dominant circulation. 2.  Left main:  Free of disease. 3.  LAD in the proximal segment has 80% focal eccentric stenosis. The  first diagonal upper branch has a 95% stenosis. The rest of the vessel  has no major obstructive disease. 4.  Left circumflex artery. The previously stented site is widely  patent. The OM1 is patent as well. 5.  The right coronary artery also is a dominant vessel. The previously  placed stents are widely patent. There is mild luminal irregularities  in the distal RCA. CONCLUSION:  1. Single vessel coronary artery disease with 80% proximal LAD and 95%  proximal D1 lesions. 2.  Normal left ventricular size and systolic function. 3.  Normal hemodynamics. INTERVENTIONAL PROCEDURE:  Catheters used are EBU 3.5 guide, Runthrough  wire.   A 3.0 x 15-mm drug-eluting stent for the proximal LAD and a 2.25  x 12-mm [place] ISAIAS stent for the diagonal.    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  There is a robust channel from the external iliac on the right to the IVC likely a large collateral  Unsuccessful attempt at removing IVC filter     All above diagnostic testing and laboratory data was independently visualized and reviewed by me (not simply review of report)       Assessment and Plan   1) DVT  -CEAP 6 venous insufficiency; made worse after bilateral GSV venous ablation  - IVC filter  -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 Celso Hdz but symptoms have improved    2) Coronary artery disease  -asymptomatic  -continue Asa, B-blocker and statin therapy    3) Paroxsymal atrial fibrillation  -no anticoagulation due to hematuria  -management per Dr. Desma Jeans     4) Essential hypertension  -elevated  -management per Dr. Desma Jeans    5) Hyperlipidemia  -continue statin therapy    Follow up in 6 months     Thank you very much for allowing me to participate in the care of your patient. Please do not hesitate to contact me if you have any questions. Win Caballero MD [address] [place] [address], Interventional Cardiology, and Peripheral Vascular Disease   [place] [address]   Ph: [phone]  Fax: [phone]    This note was scribed in the presence of Dr Roslyn Diaz MD by Emilia Cummings RN. Physician Attestation:  The scribes documentation has been prepared under my direction and personally reviewed by me in its entirety. I confirm the note above accurately reflects all work, treatment, procedures, and medical decision making performed by me.     Electronically signed by Roly Cope MD on 3/7/2023 at 12:11 PM

## 2023-03-29 ENCOUNTER — TELEPHONE (OUTPATIENT)
Dept: CARDIOLOGY CLINIC | Age: 83
End: 2023-03-29

## 2023-04-05 DIAGNOSIS — I82.401 ACUTE DEEP VEIN THROMBOSIS (DVT) OF RIGHT LOWER EXTREMITY, UNSPECIFIED VEIN (HCC): Primary | ICD-10-CM

## 2023-04-05 DIAGNOSIS — I25.10 CAD IN NATIVE ARTERY: ICD-10-CM

## 2023-04-05 RX ORDER — ISOSORBIDE MONONITRATE 60 MG/1
60 TABLET, EXTENDED RELEASE ORAL 2 TIMES DAILY
Qty: 180 TABLET | Refills: 5 | Status: SHIPPED | OUTPATIENT
Start: 2023-04-05

## 2023-04-05 NOTE — PROGRESS NOTES
Patient called me yesterday stating that he had swollen legs and had extreme difficulty walking because of pain in his calves. His primary care doctor ordered an ultrasound of his legs and it was confirmed that he has 3 clots in his legs. We will be getting that information from [facility] this morning    I told the patient to stop taking aspirin and Plavix which she was taking for his CAD stents. He was to start taking Xarelto 15 mg twice a day for 21 days and then Xarelto 20 mg daily. This prescription was called into his [facility] pharmacy this morning. He already had some previous 15 mg tablets at home  From his previous issues.

## 2023-04-27 ENCOUNTER — TELEPHONE (OUTPATIENT)
Dept: INFUSION THERAPY | Age: 83
End: 2023-04-27

## 2023-04-27 ENCOUNTER — TELEPHONE (OUTPATIENT)
Dept: CARDIOLOGY CLINIC | Age: 83
End: 2023-04-27

## 2023-04-27 NOTE — TELEPHONE ENCOUNTER
Patient called to let us know he and his wife both suffered some adverse reactions to the Leqvio injection after they both received first injection on 2/17/23. Patient states they both suffered with joint pain and muscle spasms. Patient states he is still having trouble walking due to hip pain. Also states they both had some itching all over and his wife has had problems with controlling her blood sugars since receiving the Leqvio. They were both due for second Verla Royersford on 5/18/23 but states they will not be getting Leqvio any more. Dr. Catracho Bonilla office notified.

## 2023-05-03 RX ORDER — RIVAROXABAN 15 MG/1
TABLET, FILM COATED ORAL
Qty: 180 TABLET | Refills: 3 | Status: SHIPPED | OUTPATIENT
Start: 2023-05-03

## 2023-05-04 NOTE — TELEPHONE ENCOUNTER
Patient has tried Slime Toby in the past with reported side effects. Discussed with Dr. Jose Angel Naidu and patient can discontinue Leqvio, continue Crestor and add Zetia 10 mg daily. Notified patient who states he will need to call back as he is currently at [facility]. Will await callback.

## 2023-05-05 NOTE — TELEPHONE ENCOUNTER
Spoke with patient who states he and his wife are going to discontinue Leqvio. They do not want to try Zetia and will only continue Crestor twice weekly.

## 2023-05-05 NOTE — TELEPHONE ENCOUNTER
Patient returned call for VICK Hess. I informed him that she will return his call after clinic. Francis guerrero.

## 2023-05-17 ENCOUNTER — TELEPHONE (OUTPATIENT)
Dept: CARDIOLOGY CLINIC | Age: 83
End: 2023-05-17

## 2023-05-17 NOTE — TELEPHONE ENCOUNTER
Called spoke with patient states he was taken off Plavix and ASA. He is now taking Xarelto 20mg daily  Needs to be held 2-3 days prior.

## 2023-05-17 NOTE — TELEPHONE ENCOUNTER
CARDIAC CLEARANCE     What type of procedure are you having? Total Knee Replacement    Which physician is performing your procedure? Dr. Radha Dia    When is your procedure scheduled for? TBD    Where are you having this procedure? [facility]    Are you taking Blood Thinners? If so what? (Name/dose/frequesncy)  Xarelto    Does the surgeon want you to stop your blood thinner? If so for how long?   2-3 days    Phone Number and Contact Name for Physicians office:  [phone]    Fax number to send information:  [phone]

## 2023-05-17 NOTE — TELEPHONE ENCOUNTER
Please call to verify if the patient is taking the Xarelto. When seen by  he was not taking the Xarelto for Afib (management per ) due to hematuria . Patient is also taking ASA and Plavix. Please verify his medications and what they would like held prior.

## 2023-05-19 NOTE — TELEPHONE ENCOUNTER
Dr Ayah Yost please review and advise for a pre-operative risk assessment prior to a total knee replacement. Requesting to hold the Xarelto for 2-3 days prior. Patient follows with Dr. Yan Vaughn for management of Afib and was last seen in office with you 3/6/23 for management of DVT/IVC filter.

## 2023-05-19 NOTE — TELEPHONE ENCOUNTER
Dr. Whitfield Fails reviewed, okay to hold and okay to proceed. Please call to notify and prepare a letter if necessary.

## 2023-06-05 ENCOUNTER — TELEPHONE (OUTPATIENT)
Dept: CARDIOLOGY CLINIC | Age: 83
End: 2023-06-05

## 2023-06-05 NOTE — TELEPHONE ENCOUNTER
Jac Ramey is calling stating he went to  his script for Xarelto 15 mg twice a day and the pharmacy wanted $1800 for 90 days and patient can not afford the cost.  He was already denied for patient assistance. Did not take any xarelto yesterday nor today. He lives 45 minutes out side in [place] and can not come in for samples. Is there an alternative  drug that is a tier 1 (free) or tier 2 (little cost)    Please call patient back at [phone].   He is worried about stopping the medication abruptly

## 2023-06-05 NOTE — TELEPHONE ENCOUNTER
Please call to notify of the carepath program which is typically when patient's are in the donut hold to see if he is eligible.

## 2023-06-06 NOTE — TELEPHONE ENCOUNTER
Returned call to patient. Relayed message jamir Stratton. Requested that patient call [facility] Coordinator at [phone] ([phone]), Monday-Friday, 8 AM-8 PM ET. Patient verbalized and confirmed understanding.

## 2023-06-29 ENCOUNTER — TELEPHONE (OUTPATIENT)
Dept: CARDIOLOGY CLINIC | Age: 83
End: 2023-06-29

## 2023-06-29 NOTE — TELEPHONE ENCOUNTER
Patient is scheduled for a follow up 7/11/23 with Dr. Susanna Cotton and medications can be reviewed. Continue current medications. Please notify. If he needs a refill to make it until the appointment please facilitate.

## 2023-06-29 NOTE — TELEPHONE ENCOUNTER
Called pt in regards to message below and he states he is out of Xarelto and medication is cost prohibited. Advised pt that we can get samples for him and he can pick it up at the [address] [place] [address],[address] office since it is more convenient for him. Pt v/u and states he was given Eliquis 5 mg last month and wants to know if he can just take those instead. Please advise.

## 2023-06-29 NOTE — TELEPHONE ENCOUNTER
Leroy Christy called in today he said that he had a blood cloth in his leg Dr. Gigi Guzman had put him on the [facility] and said he was to take it for 3 months but, the 3 months will be over come the end of this week and he wants to know if he is suppose to go back to taking the Plavix and Aspirin or does he need a fill on the [place].      Leroy Christy can be reached at [phone]

## 2023-06-29 NOTE — TELEPHONE ENCOUNTER
Please notify patient that he cannot continue to switch between Eliquis and Xarelto. He must choose 1 medication. Since we will not always have available samples, if not affordable, he can take Warfarin instead and be enrolled at the [facility]. We can also offer the CarePath Program for Xarelto which costs $85 monthly when he falls into the \"donut hole,\" thanks.

## 2023-07-26 ENCOUNTER — TELEPHONE (OUTPATIENT)
Dept: CARDIOLOGY CLINIC | Age: 83
End: 2023-07-26

## 2023-07-26 NOTE — TELEPHONE ENCOUNTER
Patient called the office wanting to make an appointment to be seen for pre-op clearance. He is scheduled for 7/27 in [place]. CARDIAC CLEARANCE     What type of procedure are you having? Left knee replacement    Which physician is performing your procedure? Dr. Gurpreet Cueva True    When is your procedure scheduled for? 8/10/23    Where are you having this procedure? [facility]   (patient stated [facility])    Are you taking Blood Thinners? If so what? (Name/dose/frequesncy)  XARELTO 15 MG TABS tablet     Does the surgeon want you to stop your blood thinner? If so for how long?   5 days prior    Phone Number and Contact Name for Physicians office:  [phone]    Fax number to send information:  [phone] or [phone]

## 2023-08-15 NOTE — PROGRESS NOTES
circulation. 2.  Left main:  Free of disease. 3.  LAD in the proximal segment has 80% focal eccentric stenosis. The  first diagonal upper branch has a 95% stenosis. The rest of the vessel  has no major obstructive disease. 4.  Left circumflex artery. The previously stented site is widely  patent. The OM1 is patent as well. 5.  The right coronary artery also is a dominant vessel. The previously  placed stents are widely patent. There is mild luminal irregularities  in the distal RCA. CONCLUSION:  1. Single vessel coronary artery disease with 80% proximal LAD and 95%  proximal D1 lesions. 2.  Normal left ventricular size and systolic function. 3.  Normal hemodynamics. TECHNICAL COMMENTS:  The guide engaged the ostium well and provided goodsupport. Direct stenting of the proximal LAD was made. Serial  inflation up to 16 atmospheres which corresponds to a vessel size of  approximately 3.2. Final angiography 0% residual.  Next, the wire was  removed and passed into the diagonal after predilatation. A 2.25 x  12-mm stent was deployed and postdilated to a vessel size of 2.5. Followup angiography shows 0% residual.    CONCLUSION:  1. Successful stenting of the proximal LAD lesion reduced from 80% to 0%. A 3.0 x 15 ISAIAS stent used. 2.  Successful stenting of the first diagonal.  A 2.25 x 12 ISAIAS stent used to a final diameter of 2.5. [facility]: 11/17/20  CORONARY ANGIOGRAPHY FINDINGS:  1. Left main:  Free of disease. 2.  LAD:  Ostium has an 80% lesion. Beyond that, there are two stents  in the proximal and mid LAD which are widely patent. The extreme distal  LAD at the apex has two tandem lesions in the 80% range. 3.  The first diagonal that was stented may have some in-stent stenosis. 4.  The left circumflex has no major obstructive disease. 5.  The ramus has no major obstructive disease. 6.  The right coronary artery has no major obstructive disease.   7.  All the stents that are placed are

## 2023-08-23 ENCOUNTER — OFFICE VISIT (OUTPATIENT)
Dept: CARDIOLOGY CLINIC | Age: 83
End: 2023-08-23
Payer: MEDICARE

## 2023-08-23 VITALS
BODY MASS INDEX: 40.69 KG/M2 | HEIGHT: 70 IN | HEART RATE: 67 BPM | SYSTOLIC BLOOD PRESSURE: 114 MMHG | DIASTOLIC BLOOD PRESSURE: 60 MMHG | WEIGHT: 284.2 LBS | OXYGEN SATURATION: 97 %

## 2023-08-23 DIAGNOSIS — I10 ESSENTIAL HYPERTENSION, BENIGN: Primary | ICD-10-CM

## 2023-08-23 PROCEDURE — G8427 DOCREV CUR MEDS BY ELIG CLIN: HCPCS | Performed by: INTERNAL MEDICINE

## 2023-08-23 PROCEDURE — 3078F DIAST BP <80 MM HG: CPT | Performed by: INTERNAL MEDICINE

## 2023-08-23 PROCEDURE — 99214 OFFICE O/P EST MOD 30 MIN: CPT | Performed by: INTERNAL MEDICINE

## 2023-08-23 PROCEDURE — 93000 ELECTROCARDIOGRAM COMPLETE: CPT | Performed by: INTERNAL MEDICINE

## 2023-08-23 PROCEDURE — 3074F SYST BP LT 130 MM HG: CPT | Performed by: INTERNAL MEDICINE

## 2023-08-23 PROCEDURE — 1036F TOBACCO NON-USER: CPT | Performed by: INTERNAL MEDICINE

## 2023-08-23 PROCEDURE — G8417 CALC BMI ABV UP PARAM F/U: HCPCS | Performed by: INTERNAL MEDICINE

## 2023-08-23 PROCEDURE — 1123F ACP DISCUSS/DSCN MKR DOCD: CPT | Performed by: INTERNAL MEDICINE

## 2023-08-23 RX ORDER — TRAMADOL HYDROCHLORIDE 50 MG/1
50 TABLET ORAL EVERY 6 HOURS PRN
COMMUNITY

## 2023-08-23 NOTE — PATIENT INSTRUCTIONS
[address] -- can check rhythm when you are having palpitations. Samantha Walsh (for assistance with Xarelto)> [phone].

## 2023-09-15 ENCOUNTER — TELEPHONE (OUTPATIENT)
Dept: CARDIOLOGY CLINIC | Age: 83
End: 2023-09-15

## 2023-09-15 DIAGNOSIS — I25.10 CAD IN NATIVE ARTERY: ICD-10-CM

## 2023-09-15 RX ORDER — ROSUVASTATIN CALCIUM 5 MG/1
TABLET, COATED ORAL
Qty: 15 TABLET | Refills: 3 | Status: SHIPPED | OUTPATIENT
Start: 2023-09-15

## 2023-09-15 NOTE — TELEPHONE ENCOUNTER
Dr. Alisa Pratt,   Patient states he is on Imdur 60 mg BID and script sent in 4/5/2023 but most recent visits says 60 mg daily? Please advise. Thanks. MA's   He should reach out to his insurance to see if they provide transportation and then update our office.

## 2023-09-15 NOTE — TELEPHONE ENCOUNTER
Mary Torres called the office stating that he has a 6 month f/u with Dr. Ruffin Credit on 9/25, he just had knee replacement, yesterday. He is wanting to know if transportation could be provided from his home address in [place], [place] to [address] [place] [address] also shared that his Isosorbide medication is going to run out due to the new Rx not having updated instructions. At his last OV, he was instructed to take the medication 60 mg, twice a day.     Please assist.    Mary Torres callback: [phone]

## 2023-09-15 NOTE — TELEPHONE ENCOUNTER
Last OV: 8/23/23  Next OV: 9/25/23  Last refill: 1/27/23  #15  3 R/F  Most recent Labs: 2/10/23  Last EKG (if needed): 8/23/23

## 2023-09-18 RX ORDER — ISOSORBIDE MONONITRATE 60 MG/1
60 TABLET, EXTENDED RELEASE ORAL 2 TIMES DAILY
Qty: 180 TABLET | Refills: 5 | Status: SHIPPED | OUTPATIENT
Start: 2023-09-18

## 2023-09-18 NOTE — TELEPHONE ENCOUNTER
Left detailed message for patient that refill for IMDUR 60 mg BID was sent to his local [facility] pharmacy. Also informed that he should contact insurance company to set up transportation.

## 2023-09-20 NOTE — PROGRESS NOTES
Interventional Cardiology Consultation     [address] [place] [address]  1940    PCP: Tomi Nolasco MD  Referring Physician: Dr Sera Santiago  Reason for Referral: IVC filter removal  Chief Complaint: \"I feel good\"     Subjective:     History of Present Illness: The patient is 81 y.o. male with a past medical history significant for coronary artery disease, venous insufficiency, atrial fibrillation, DVT s/p IVC filter, hypertension, and hyperlipidemia. He was referred by Dr. Oliverio Hull to discuss IVC filter removal. He had increased swelling in both legs with recurrent cellulitis. He keeps his legs wrapped and wears compression stockings as much possible. 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 There is a robust channel from the external iliac on the right to the IVC likely a large collateral and unsuccessful attempt at removing IVC filter. Today, he is here for follow up. Recent knee surgery. Has some left leg edema. States he is doing well. Patient currently denies any weight gain, palpitations, chest pain, shortness of breath, dizziness, and syncope. Past Medical History:   Diagnosis Date    Anesthesia     hallucinations for prostate surgery     Arthritis     Atrial fibrillation ([address])     CAD (coronary artery disease)     Cancer ([address])     Kidney    DVT (deep venous thrombosis) (HCC)     CORNELIUS legs post prostate surgery 5 yrs ago    Gout     Hx of blood clots     Hyperlipemia 2/2/2023    Hypertension      Past Surgical History:   Procedure Laterality Date    BACK SURGERY      3 surgeries total.  Lumbar 3&4,    CARDIAC CATHETERIZATION  2011    CARDIAC SURGERY      stents placed- 1 in 2000 and 2 in 2002.  3 surgeries total    CATARACT REMOVAL Bilateral     CHOLECYSTECTOMY      COLONOSCOPY      HERNIA REPAIR Left      X2.   Left flank and right inguinal    JOINT REPLACEMENT  2009    right knee replacement    KIDNEY SURGERY

## 2023-09-25 ENCOUNTER — OFFICE VISIT (OUTPATIENT)
Dept: CARDIOLOGY CLINIC | Age: 83
End: 2023-09-25
Payer: MEDICARE

## 2023-09-25 ENCOUNTER — TELEPHONE (OUTPATIENT)
Dept: CARDIOLOGY CLINIC | Age: 83
End: 2023-09-25

## 2023-09-25 VITALS
OXYGEN SATURATION: 94 % | BODY MASS INDEX: 41.12 KG/M2 | SYSTOLIC BLOOD PRESSURE: 142 MMHG | DIASTOLIC BLOOD PRESSURE: 62 MMHG | TEMPERATURE: 97.7 F | WEIGHT: 287.2 LBS | HEART RATE: 72 BPM | HEIGHT: 70 IN

## 2023-09-25 DIAGNOSIS — I25.119 CORONARY ARTERY DISEASE INVOLVING NATIVE CORONARY ARTERY OF NATIVE HEART WITH ANGINA PECTORIS (HCC): Primary | ICD-10-CM

## 2023-09-25 DIAGNOSIS — I82.412 DVT FEMORAL (DEEP VENOUS THROMBOSIS) WITH THROMBOPHLEBITIS, LEFT (HCC): ICD-10-CM

## 2023-09-25 PROCEDURE — G8427 DOCREV CUR MEDS BY ELIG CLIN: HCPCS | Performed by: INTERNAL MEDICINE

## 2023-09-25 PROCEDURE — 3078F DIAST BP <80 MM HG: CPT | Performed by: INTERNAL MEDICINE

## 2023-09-25 PROCEDURE — 1123F ACP DISCUSS/DSCN MKR DOCD: CPT | Performed by: INTERNAL MEDICINE

## 2023-09-25 PROCEDURE — 1036F TOBACCO NON-USER: CPT | Performed by: INTERNAL MEDICINE

## 2023-09-25 PROCEDURE — G8417 CALC BMI ABV UP PARAM F/U: HCPCS | Performed by: INTERNAL MEDICINE

## 2023-09-25 PROCEDURE — 3074F SYST BP LT 130 MM HG: CPT | Performed by: INTERNAL MEDICINE

## 2023-09-25 PROCEDURE — 99213 OFFICE O/P EST LOW 20 MIN: CPT | Performed by: INTERNAL MEDICINE

## 2023-09-25 RX ORDER — CYCLOBENZAPRINE HCL 10 MG
TABLET ORAL
COMMUNITY
Start: 2023-09-15

## 2023-09-25 RX ORDER — ONDANSETRON HYDROCHLORIDE 8 MG/1
8 TABLET, FILM COATED ORAL EVERY 8 HOURS PRN
COMMUNITY
Start: 2023-09-15

## 2023-09-25 RX ORDER — OXYCODONE HYDROCHLORIDE 5 MG/1
TABLET ORAL
COMMUNITY
Start: 2023-09-15

## 2023-10-05 RX ORDER — NITROGLYCERIN 0.4 MG/1
0.4 TABLET SUBLINGUAL EVERY 5 MIN PRN
Qty: 25 TABLET | Refills: 3 | Status: SHIPPED | OUTPATIENT
Start: 2023-10-05

## 2023-10-05 NOTE — TELEPHONE ENCOUNTER
Medication Refill    Medication needing refilled:NITROGLYCERIN     Dosage of the medication:0.4mg sl tab    How are you taking this medication (QD, BID, TID, QID, PRN):Place 1 tablet under the tongue every 5 minutes as needed for Chest pain    30 or 90 day supply called in:25 tablets     When will you run out of your medication:now    Which Pharmacy are we sending the medication to?:[facility]/pharmacy #[id number]- [address] [phone]   [address] [place] [address], [address] [place] [address] # [phone]

## 2023-10-24 ENCOUNTER — OFFICE VISIT (OUTPATIENT)
Dept: CARDIOLOGY CLINIC | Age: 83
End: 2023-10-24
Payer: MEDICARE

## 2023-10-24 VITALS
DIASTOLIC BLOOD PRESSURE: 70 MMHG | HEIGHT: 70 IN | OXYGEN SATURATION: 95 % | SYSTOLIC BLOOD PRESSURE: 132 MMHG | BODY MASS INDEX: 39.51 KG/M2 | HEART RATE: 82 BPM | WEIGHT: 276 LBS

## 2023-10-24 DIAGNOSIS — I25.10 CAD IN NATIVE ARTERY: ICD-10-CM

## 2023-10-24 DIAGNOSIS — I82.412 DVT FEMORAL (DEEP VENOUS THROMBOSIS) WITH THROMBOPHLEBITIS, LEFT (HCC): ICD-10-CM

## 2023-10-24 DIAGNOSIS — I25.119 CORONARY ARTERY DISEASE INVOLVING NATIVE CORONARY ARTERY OF NATIVE HEART WITH ANGINA PECTORIS (HCC): Primary | ICD-10-CM

## 2023-10-24 PROCEDURE — 93000 ELECTROCARDIOGRAM COMPLETE: CPT | Performed by: INTERNAL MEDICINE

## 2023-10-24 PROCEDURE — 3075F SYST BP GE 130 - 139MM HG: CPT | Performed by: INTERNAL MEDICINE

## 2023-10-24 PROCEDURE — 1123F ACP DISCUSS/DSCN MKR DOCD: CPT | Performed by: INTERNAL MEDICINE

## 2023-10-24 PROCEDURE — G8484 FLU IMMUNIZE NO ADMIN: HCPCS | Performed by: INTERNAL MEDICINE

## 2023-10-24 PROCEDURE — 1036F TOBACCO NON-USER: CPT | Performed by: INTERNAL MEDICINE

## 2023-10-24 PROCEDURE — 3078F DIAST BP <80 MM HG: CPT | Performed by: INTERNAL MEDICINE

## 2023-10-24 PROCEDURE — G8417 CALC BMI ABV UP PARAM F/U: HCPCS | Performed by: INTERNAL MEDICINE

## 2023-10-24 PROCEDURE — 99214 OFFICE O/P EST MOD 30 MIN: CPT | Performed by: INTERNAL MEDICINE

## 2023-10-24 PROCEDURE — G8427 DOCREV CUR MEDS BY ELIG CLIN: HCPCS | Performed by: INTERNAL MEDICINE

## 2023-10-24 NOTE — PROGRESS NOTES
[address] [facility] - Cardiology      CC: CAD    History of present illness:   Mr. Mari Stearns is a 81-year-old gentleman with a history of coronary disease with multiple stents. He also has a history of inferior vena cava thrombosis. Attempt to remove it was unsuccessful but it did cannulized and open the channel which has helped dramatically his lower extremity swelling. On today's visit he has no cardiac complaints. Denies any chest pain shortness of breath orthopnea to me. He is abdominal pain after eating spicy food. Past Medical History:   Diagnosis Date    Anesthesia     hallucinations for prostate surgery     Arthritis     Atrial fibrillation ([address])     CAD (coronary artery disease)     Cancer ([address])     Kidney    DVT (deep venous thrombosis) (HCC)     CORNELIUS legs post prostate surgery 5 yrs ago    Gout     Hx of blood clots     Hyperlipemia 2023    Hypertension      Past Surgical History:   Procedure Laterality Date    BACK SURGERY      3 surgeries total.  Lumbar 3&4,    CARDIAC CATHETERIZATION      CARDIAC SURGERY      stents placed- 1 in  and 2 in .  3 surgeries total    CATARACT REMOVAL Bilateral     CHOLECYSTECTOMY      COLONOSCOPY      HERNIA REPAIR Left      X2.   Left flank and right inguinal    JOINT REPLACEMENT  2009    right knee replacement    KIDNEY SURGERY      tumor removed     NASAL SEPTUM SURGERY      PRESSURE ULCER DEBRIDEMENT      had a stage 4 wound on coccyx 6 years ago    PROSTATE SURGERY      2012-prostate removal due to enlargement (not cancerous)    TUMOR REMOVAL Left     kidney    VENA CAVA FILTER PLACEMENT      5 yrs ago     Social History     Tobacco Use    Smoking status: Former     Packs/day: 0.50     Years: 6.00     Additional pack years: 0.00     Total pack years: 3.00     Types: Cigarettes     Quit date: 1966     Years since quittin.3    Smokeless tobacco: Never   Substance Use Topics    Alcohol use: No     Review

## 2023-10-26 LAB
CHOLESTEROL, TOTAL: 90 MG/DL
HDLC SERPL-MCNC: 40 MG/DL
LDL CHOLESTEROL CALCULATED: 30 MG/DL
NON-HDL CHOLESTEROL: 50 MG/DL
TRIGL SERPL-MCNC: 99 MG/DL

## 2023-11-13 RX ORDER — CLOPIDOGREL BISULFATE 75 MG/1
TABLET ORAL
Qty: 90 TABLET | Refills: 3 | Status: SHIPPED | OUTPATIENT
Start: 2023-11-13

## 2023-11-13 NOTE — TELEPHONE ENCOUNTER
Last OV: 10/24/23  Next OV: 4/30/24  Last refill:   samples given 10/24/23  Most recent Labs:   10/26/23  Last EKG (if needed):  10/24/23

## 2024-01-22 RX ORDER — CHLORTHALIDONE 25 MG/1
25 TABLET ORAL DAILY
Qty: 90 TABLET | Refills: 3 | Status: SHIPPED | OUTPATIENT
Start: 2024-01-22

## 2024-01-22 NOTE — TELEPHONE ENCOUNTER
Dr Smith completed a telephone call appointment with pt   Last OV:10/24/2023  Last Labs:10/26/2023  Last Refills:10/27/2023  Next Appt:4/30/2024    Last EKG:  10/24/2023

## 2024-01-24 ENCOUNTER — OFFICE VISIT (OUTPATIENT)
Dept: CARDIOLOGY CLINIC | Age: 84
End: 2024-01-24

## 2024-01-24 VITALS
DIASTOLIC BLOOD PRESSURE: 70 MMHG | HEIGHT: 70 IN | OXYGEN SATURATION: 97 % | HEART RATE: 67 BPM | BODY MASS INDEX: 40.57 KG/M2 | SYSTOLIC BLOOD PRESSURE: 144 MMHG | WEIGHT: 283.4 LBS

## 2024-01-24 DIAGNOSIS — I82.412 DVT FEMORAL (DEEP VENOUS THROMBOSIS) WITH THROMBOPHLEBITIS, LEFT (HCC): Primary | ICD-10-CM

## 2024-01-24 RX ORDER — ASPIRIN 81 MG/1
81 TABLET ORAL DAILY
COMMUNITY

## 2024-01-24 RX ORDER — OMEPRAZOLE 40 MG/1
40 CAPSULE, DELAYED RELEASE ORAL DAILY
COMMUNITY

## 2024-01-24 RX ORDER — FUROSEMIDE 40 MG/1
40 TABLET ORAL DAILY
Qty: 30 TABLET | Refills: 0
Start: 2024-01-24

## 2024-01-24 NOTE — PROGRESS NOTES
Interventional Cardiology Consultation     Francis Shirley  1940    PCP: Darnell Pace MD  Referring Physician: Dr Christianson/Dr. Chisholm  Reason for Referral: IVC filter removal  Chief Complaint: \"my legs are swollen\"     Subjective:     History of Present Illness: The patient is 83 y.o. male with a past medical history significant for coronary artery disease, venous insufficiency, atrial fibrillation, DVT s/p IVC filter, hypertension, and hyperlipidemia. He had increased swelling in both legs with recurrent cellulitis. He keeps his legs wrapped and wears compression stockings as much possible. 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 There is a robust channel from the external iliac on the right to the IVC likely a large collateral and unsuccessful attempt at removing IVC filter. Patient here today for follow up. Reports overall he is doing good. Patient still experiencing bilateral lower extremity edema following his previous knee surgery. Patient does wear compression socks with noted improvement. Patient states he wears his compression pumps 1 hour per day. Denies any chest pain or shortness of breath.      Past Medical History:   Diagnosis Date    Anesthesia     hallucinations for prostate surgery     Arthritis     Atrial fibrillation (HCC)     CAD (coronary artery disease)     Cancer (HCC)     Kidney    DVT (deep venous thrombosis) (HCC)     CORNELIUS legs post prostate surgery 5 yrs ago    Gout     Hx of blood clots     Hyperlipemia 2/2/2023    Hypertension      Past Surgical History:   Procedure Laterality Date    BACK SURGERY      3 surgeries total.  Lumbar 3&4,    CARDIAC CATHETERIZATION  2011    CARDIAC SURGERY      stents placed- 1 in 2000 and 2 in 2002.  3 surgeries total    CATARACT REMOVAL Bilateral     CHOLECYSTECTOMY      COLONOSCOPY      HERNIA REPAIR Left      X2.  Left flank and right inguinal    JOINT

## 2024-01-24 NOTE — PATIENT INSTRUCTIONS
Take 40 mg Lasix by mouth daily for 1 week  Continue compression therapy and elevation    Call our office in 2-3 weeks if symptoms worsen or fail to improve  [phone]

## 2024-04-30 ENCOUNTER — OFFICE VISIT (OUTPATIENT)
Dept: CARDIOLOGY CLINIC | Age: 84
End: 2024-04-30
Payer: MEDICARE

## 2024-04-30 VITALS
DIASTOLIC BLOOD PRESSURE: 78 MMHG | HEART RATE: 70 BPM | WEIGHT: 280 LBS | OXYGEN SATURATION: 95 % | HEIGHT: 70 IN | BODY MASS INDEX: 40.09 KG/M2 | SYSTOLIC BLOOD PRESSURE: 138 MMHG

## 2024-04-30 DIAGNOSIS — I10 ESSENTIAL HYPERTENSION, BENIGN: ICD-10-CM

## 2024-04-30 DIAGNOSIS — I82.412 DVT FEMORAL (DEEP VENOUS THROMBOSIS) WITH THROMBOPHLEBITIS, LEFT (HCC): Primary | ICD-10-CM

## 2024-04-30 DIAGNOSIS — I25.119 CORONARY ARTERY DISEASE INVOLVING NATIVE CORONARY ARTERY OF NATIVE HEART WITH ANGINA PECTORIS (HCC): ICD-10-CM

## 2024-04-30 DIAGNOSIS — I48.0 PAF (PAROXYSMAL ATRIAL FIBRILLATION) (HCC): ICD-10-CM

## 2024-04-30 PROCEDURE — 99214 OFFICE O/P EST MOD 30 MIN: CPT | Performed by: INTERNAL MEDICINE

## 2024-04-30 PROCEDURE — 3078F DIAST BP <80 MM HG: CPT | Performed by: INTERNAL MEDICINE

## 2024-04-30 PROCEDURE — 3075F SYST BP GE 130 - 139MM HG: CPT | Performed by: INTERNAL MEDICINE

## 2024-04-30 PROCEDURE — G8427 DOCREV CUR MEDS BY ELIG CLIN: HCPCS | Performed by: INTERNAL MEDICINE

## 2024-04-30 PROCEDURE — 1123F ACP DISCUSS/DSCN MKR DOCD: CPT | Performed by: INTERNAL MEDICINE

## 2024-04-30 PROCEDURE — 1036F TOBACCO NON-USER: CPT | Performed by: INTERNAL MEDICINE

## 2024-04-30 PROCEDURE — G8417 CALC BMI ABV UP PARAM F/U: HCPCS | Performed by: INTERNAL MEDICINE

## 2024-04-30 PROCEDURE — 93000 ELECTROCARDIOGRAM COMPLETE: CPT | Performed by: INTERNAL MEDICINE

## 2024-05-13 RX ORDER — ROSUVASTATIN CALCIUM 5 MG/1
TABLET, COATED ORAL
Qty: 15 TABLET | Refills: 3 | Status: SHIPPED | OUTPATIENT
Start: 2024-05-13

## 2024-05-28 NOTE — PROGRESS NOTES
Interventional Cardiology Consultation     Francis Shirley  1940    PCP: Darnell Pace MD  Referring Physician: Dr Christianson/Dr. Chisholm  Reason for Referral: IVC filter removal  Chief Complaint: \"my legs are swollen\"     Subjective:     History of Present Illness: The patient is 83 y.o. male with a past medical history significant for coronary artery disease, venous insufficiency, atrial fibrillation, DVT s/p IVC filter, hypertension, and hyperlipidemia. He had increased swelling in both legs with recurrent cellulitis. He keeps his legs wrapped and wears compression stockings as much possible. 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 There is a robust channel from the external iliac on the right to the IVC likely a large collateral and unsuccessful attempt at removing IVC filter.       Past Medical History:   Diagnosis Date    Anesthesia     hallucinations for prostate surgery     Arthritis     Atrial fibrillation (HCC)     CAD (coronary artery disease)     Cancer (HCC)     Kidney    DVT (deep venous thrombosis) (HCC)     CORNELIUS legs post prostate surgery 5 yrs ago    Gout     Hx of blood clots     Hyperlipemia 2/2/2023    Hypertension      Past Surgical History:   Procedure Laterality Date    BACK SURGERY      3 surgeries total.  Lumbar 3&4,    CARDIAC CATHETERIZATION  2011    CARDIAC SURGERY      stents placed- 1 in 2000 and 2 in 2002.  3 surgeries total    CATARACT REMOVAL Bilateral     CHOLECYSTECTOMY      COLONOSCOPY      HERNIA REPAIR Left      X2.  Left flank and right inguinal    JOINT REPLACEMENT  2009    right knee replacement    KIDNEY SURGERY      tumor removed     NASAL SEPTUM SURGERY      PRESSURE ULCER DEBRIDEMENT      had a stage 4 wound on coccyx 6 years ago    PROSTATE SURGERY      2012-prostate removal due to enlargement (not cancerous)    TOTAL KNEE ARTHROPLASTY Left 09/14/2023    TUMOR REMOVAL Left

## 2024-05-29 ENCOUNTER — OFFICE VISIT (OUTPATIENT)
Dept: CARDIOLOGY CLINIC | Age: 84
End: 2024-05-29

## 2024-05-29 VITALS
SYSTOLIC BLOOD PRESSURE: 124 MMHG | OXYGEN SATURATION: 94 % | BODY MASS INDEX: 40.06 KG/M2 | HEIGHT: 70 IN | WEIGHT: 279.8 LBS | HEART RATE: 72 BPM | DIASTOLIC BLOOD PRESSURE: 70 MMHG

## 2024-05-29 DIAGNOSIS — I82.412 DVT FEMORAL (DEEP VENOUS THROMBOSIS) WITH THROMBOPHLEBITIS, LEFT (HCC): Primary | ICD-10-CM

## 2024-06-28 ENCOUNTER — TELEPHONE (OUTPATIENT)
Dept: CARDIOLOGY CLINIC | Age: 84
End: 2024-06-28

## 2024-06-28 NOTE — TELEPHONE ENCOUNTER
CARDIAC CLEARANCE     What type of procedure are you having?  Choledocholithiasis     Which physician is performing your procedure?   Dr. Matt    When is your procedure scheduled?  7/15/24    Are you taking Blood Thinners?  Plavix    Does the surgeon want you to stop your blood thinner? Yes, 5 days                            Phone Number and Contact Name for Physicians office:      Fax number to send information:   [phone]        Please advise

## 2024-07-25 ENCOUNTER — TELEPHONE (OUTPATIENT)
Dept: CARDIOLOGY CLINIC | Age: 84
End: 2024-07-25

## 2024-07-25 NOTE — TELEPHONE ENCOUNTER
CARDIAC CLEARANCE     What type of procedure are you having?  Cortisone injection    Which physician is performing your procedure?  Celso Reilly MD    When is your procedure scheduled?   TBD    Where are you having this procedure?  [facility]    Are you taking Blood Thinners?   Plavix    Does the surgeon want you to stop your blood thinner? Yes 7 days prior    Phone Number and Contact Name for Physicians office: Jeffy  fax [phone]    Prior note   Mr. Silva is a 83 y.o. gentleman with a history of coronary disease with multiple stents.  He also has a history of inferior vena cava thrombosis.  Attempt to remove it was unsuccessful but it did cannulized and open the channel which has helped dramatically his lower extremity swelling.

## 2024-07-29 NOTE — TELEPHONE ENCOUNTER
Jeremi Chisholm MD      Yes may stop his antiplatelet drugs for 7 days for spinal injection and then resume immediately.

## 2024-08-19 NOTE — PROGRESS NOTES
[facility] PRE-OPERATIVE INSTRUCTIONS    Day of Procedure:  8/30/2024                Arrival time: 0930                 Surgery time: 1100    Take the following medications with a sip of water:  Follow your MD/Surgeons pre-procedure instructions regarding your medications     Do not eat or drink anything after 12:00 midnight prior to your surgery.  This includes water chewing gum, mints and ice chips.   You may brush your teeth and gargle the morning of your surgery, but do not swallow the water     Please see your family doctor/pediatrician for a history and physical and/or concerning medications.   Bring any test results/reports from your physicians office.   If you are under the care of a heart doctor or specialist doctor, please be aware that you may be asked to them for clearance    You may be asked to stop blood thinners such as Coumadin, Plavix, Fragmin, Lovenox, etc., or any anti-inflammatories such as:  Aspirin, Ibuprofen, Advil, Naproxen prior to your surgery.    We also ask that you stop any OTC medications such as fish oil, vitamin E, glucosamine, garlic, Multivitamins, COQ 10, etc.    We ask that you do not smoke 24 hours prior to surgery  We ask that you do not  drink any alcoholic beverages 24 hours prior to surgery     You must make arrangements for a responsible adult to take you home after your surgery.    For your safety you will not be allowed to leave alone or drive yourself home.  Your surgery will be cancelled if you do not have a ride home.     Also for your safety, you must have someone stay with you the first 24 hours after your surgery.     A parent or legal guardian must accompany a child scheduled for surgery and plan to stay at the hospital until the child is discharged.    Please do not bring other children with you.    For your comfort, please wear simple loose fitting clothing to the hospital.  Please do not bring valuables.    Do not wear any make-up or nail polish on

## 2024-08-29 ENCOUNTER — ANESTHESIA EVENT (OUTPATIENT)
Dept: ENDOSCOPY | Age: 84
End: 2024-08-29
Payer: MEDICARE

## 2024-08-30 ENCOUNTER — APPOINTMENT (OUTPATIENT)
Dept: GENERAL RADIOLOGY | Age: 84
End: 2024-08-30
Attending: INTERNAL MEDICINE
Payer: MEDICARE

## 2024-08-30 ENCOUNTER — ANESTHESIA (OUTPATIENT)
Dept: ENDOSCOPY | Age: 84
End: 2024-08-30
Payer: MEDICARE

## 2024-08-30 ENCOUNTER — HOSPITAL ENCOUNTER (OUTPATIENT)
Age: 84
Setting detail: OUTPATIENT SURGERY
Discharge: HOME | End: 2024-08-30
Attending: INTERNAL MEDICINE | Admitting: INTERNAL MEDICINE
Payer: MEDICARE

## 2024-08-30 VITALS
TEMPERATURE: 96.8 F | WEIGHT: 272.9 LBS | HEART RATE: 69 BPM | SYSTOLIC BLOOD PRESSURE: 141 MMHG | RESPIRATION RATE: 18 BRPM | DIASTOLIC BLOOD PRESSURE: 64 MMHG | BODY MASS INDEX: 39.07 KG/M2 | HEIGHT: 70 IN | OXYGEN SATURATION: 94 %

## 2024-08-30 PROCEDURE — 7100000010 HC PHASE II RECOVERY - FIRST 15 MIN: Performed by: INTERNAL MEDICINE

## 2024-08-30 PROCEDURE — 7100000000 HC PACU RECOVERY - FIRST 15 MIN: Performed by: INTERNAL MEDICINE

## 2024-08-30 PROCEDURE — 7100000001 HC PACU RECOVERY - ADDTL 15 MIN: Performed by: INTERNAL MEDICINE

## 2024-08-30 PROCEDURE — 74018 RADEX ABDOMEN 1 VIEW: CPT

## 2024-08-30 PROCEDURE — 2709999900 HC NON-CHARGEABLE SUPPLY: Performed by: INTERNAL MEDICINE

## 2024-08-30 PROCEDURE — 6360000002 HC RX W HCPCS

## 2024-08-30 PROCEDURE — 3700000001 HC ADD 15 MINUTES (ANESTHESIA): Performed by: INTERNAL MEDICINE

## 2024-08-30 PROCEDURE — 2500000003 HC RX 250 WO HCPCS

## 2024-08-30 PROCEDURE — 2580000003 HC RX 258: Performed by: ANESTHESIOLOGY

## 2024-08-30 PROCEDURE — 3609017100 HC EGD: Performed by: INTERNAL MEDICINE

## 2024-08-30 PROCEDURE — 3700000000 HC ANESTHESIA ATTENDED CARE: Performed by: INTERNAL MEDICINE

## 2024-08-30 PROCEDURE — 7100000011 HC PHASE II RECOVERY - ADDTL 15 MIN: Performed by: INTERNAL MEDICINE

## 2024-08-30 RX ORDER — SODIUM CHLORIDE 0.9 % (FLUSH) 0.9 %
5-40 SYRINGE (ML) INJECTION EVERY 12 HOURS SCHEDULED
Status: DISCONTINUED | OUTPATIENT
Start: 2024-08-30 | End: 2024-08-30 | Stop reason: HOSPADM

## 2024-08-30 RX ORDER — SODIUM CHLORIDE 0.9 % (FLUSH) 0.9 %
5-40 SYRINGE (ML) INJECTION PRN
Status: DISCONTINUED | OUTPATIENT
Start: 2024-08-30 | End: 2024-08-30 | Stop reason: HOSPADM

## 2024-08-30 RX ORDER — SODIUM CHLORIDE 9 MG/ML
INJECTION, SOLUTION INTRAVENOUS PRN
Status: DISCONTINUED | OUTPATIENT
Start: 2024-08-30 | End: 2024-08-30 | Stop reason: HOSPADM

## 2024-08-30 RX ORDER — PROPOFOL 10 MG/ML
INJECTION, EMULSION INTRAVENOUS PRN
Status: DISCONTINUED | OUTPATIENT
Start: 2024-08-30 | End: 2024-08-30 | Stop reason: SDUPTHER

## 2024-08-30 RX ORDER — NALOXONE HYDROCHLORIDE 0.4 MG/ML
INJECTION, SOLUTION INTRAMUSCULAR; INTRAVENOUS; SUBCUTANEOUS PRN
Status: DISCONTINUED | OUTPATIENT
Start: 2024-08-30 | End: 2024-08-30 | Stop reason: HOSPADM

## 2024-08-30 RX ORDER — LIDOCAINE HYDROCHLORIDE 20 MG/ML
INJECTION, SOLUTION EPIDURAL; INFILTRATION; INTRACAUDAL; PERINEURAL PRN
Status: DISCONTINUED | OUTPATIENT
Start: 2024-08-30 | End: 2024-08-30 | Stop reason: SDUPTHER

## 2024-08-30 RX ORDER — ONDANSETRON 2 MG/ML
4 INJECTION INTRAMUSCULAR; INTRAVENOUS
Status: DISCONTINUED | OUTPATIENT
Start: 2024-08-30 | End: 2024-08-30 | Stop reason: HOSPADM

## 2024-08-30 RX ADMIN — PROPOFOL 150 MCG/KG/MIN: 10 INJECTION, EMULSION INTRAVENOUS at 11:01

## 2024-08-30 RX ADMIN — LIDOCAINE HYDROCHLORIDE 100 MG: 20 INJECTION, SOLUTION EPIDURAL; INFILTRATION; INTRACAUDAL; PERINEURAL at 10:59

## 2024-08-30 RX ADMIN — SODIUM CHLORIDE: 9 INJECTION, SOLUTION INTRAVENOUS at 10:00

## 2024-08-30 RX ADMIN — PROPOFOL 100 MG: 10 INJECTION, EMULSION INTRAVENOUS at 10:59

## 2024-08-30 ASSESSMENT — ENCOUNTER SYMPTOMS: SHORTNESS OF BREATH: 0

## 2024-08-30 ASSESSMENT — PAIN - FUNCTIONAL ASSESSMENT
PAIN_FUNCTIONAL_ASSESSMENT: ADULT NONVERBAL PAIN SCALE (NPVS)
PAIN_FUNCTIONAL_ASSESSMENT: 0-10
PAIN_FUNCTIONAL_ASSESSMENT: 0-10

## 2024-08-30 NOTE — ANESTHESIA PRE PROCEDURE
Department of Anesthesiology  Preprocedure Note       Name:  Francis Shirley   Age:  84 y.o.  :  1940                                          MRN:  [id number]         Date:  2024      Surgeon: Surgeon(s):  Kal Matt MD    Procedure: Procedure(s):  ESOPHAGOGASTRODUODENOSCOPY WITH STENT REMOVAL    Medications prior to admission:   Prior to Admission medications    Medication Sig Start Date End Date Taking? Authorizing Provider   omeprazole (PRILOSEC) 40 MG delayed release capsule Take 1 capsule by mouth daily   Yes Rolo Meredith MD   furosemide (LASIX) 40 MG tablet Take 1 tablet by mouth daily  Patient taking differently: Take 1 tablet by mouth daily PRN 24  Yes Tobi Damon MD   chlorthalidone (HYGROTON) 25 MG tablet TAKE 1 TABLET BY MOUTH EVERY DAY 24  Yes Jeremi Chisholm MD   nitroGLYCERIN (NITROSTAT) 0.4 MG SL tablet Place 1 tablet under the tongue every 5 minutes as needed for Chest pain 10/5/23  Yes Jeremi Chisholm MD   isosorbide mononitrate (IMDUR) 60 MG extended release tablet Take 1 tablet by mouth 2 times daily 23  Yes Jeremi Chisholm MD   magnesium oxide (MAG-OX) 400 MG tablet Take 1 tablet by mouth daily 10/12/22  Yes Jeremi Chisholm MD   labetalol (NORMODYNE) 300 MG tablet Take 1 tablet by mouth 2 times daily 21  Yes Manfred Rosas MD   Ascorbic Acid (VITAMIN C) 250 MG tablet Take 4 tablets by mouth daily   Yes Rolo Meredith MD   b complex vitamins capsule Take 1 capsule by mouth daily   Yes Rolo Meredith MD   Cholecalciferol (VITAMIN D3) 2000 UNITS CAPS Take 1 capsule by mouth daily   Yes Rolo Meredith MD   aspirin 81 MG EC tablet Take 1 tablet by mouth daily    Rolo Meredith MD   clopidogrel (PLAVIX) 75 MG tablet TAKE 1 TABLET BY MOUTH EVERY DAY 23   Jeremi Chisholm MD       Current medications:    Current Facility-Administered Medications   Medication Dose Route Frequency Provider Last Rate Last Admin    sodium  Results   Component Value Date/Time     02/10/2023 05:10 AM    K 4.5 02/10/2023 05:10 AM    K 3.8 01/29/2019 04:48 AM     02/10/2023 05:10 AM    CO2 27 02/10/2023 05:10 AM    BUN 19 02/10/2023 05:10 AM    CREATININE 1.1 02/10/2023 05:10 AM    GFRAA 51 03/09/2022 08:59 AM    GFRAA >60 07/20/2011 03:34 PM    AGRATIO 1.6 01/27/2019 03:52 PM    LABGLOM >60 02/10/2023 05:10 AM    GLUCOSE 117 02/10/2023 05:10 AM    GLUCOSE 110 03/09/2022 08:59 AM    CALCIUM 9.3 02/10/2023 05:10 AM    BILITOT 0.7 10/12/2022 02:14 PM    ALKPHOS 68 10/12/2022 02:14 PM    AST 15 10/12/2022 02:14 PM    ALT 17 10/12/2022 02:14 PM       POC Tests: No results for input(s): \"POCGLU\", \"POCNA\", \"POCK\", \"POCCL\", \"POCBUN\", \"POCHEMO\", \"POCHCT\" in the last 72 hours.    Coags:   Lab Results   Component Value Date/Time    PROTIME 12.1 01/27/2019 03:52 PM    INR 1.06 01/27/2019 03:52 PM    APTT 25.0 02/10/2023 08:39 AM       HCG (If Applicable): No results found for: \"PREGTESTUR\", \"PREGSERUM\", \"HCG\", \"HCGQUANT\"     ABGs: No results found for: \"PHART\", \"PO2ART\", \"NIQ7ZJW\", \"JVV6MBO\", \"BEART\", \"V2PPDAGN\"     Type & Screen (If Applicable):  No results found for: \"LABABO\"    Drug/Infectious Status (If Applicable):  No results found for: \"HIV\", \"HEPCAB\"    COVID-19 Screening (If Applicable): No results found for: \"COVID19\"        Anesthesia Evaluation  Patient summary reviewed   history of anesthetic complications: postop delirium.  Airway: Mallampati: II  TM distance: >3 FB   Neck ROM: full  Mouth opening: > = 3 FB   Dental:    (+) partials      Pulmonary: breath sounds clear to auscultation  (+)     sleep apnea:           (-) COPD, asthma, shortness of breath and recent URI                           Cardiovascular:    (+) hypertension:, CAD:, CABG/stent (9 stents):, dysrhythmias: atrial fibrillation, hyperlipidemia      ECG reviewed  Rhythm: regular  Rate: normal                    Neuro/Psych:      (-) seizures, neuromuscular disease, TIA, CVA

## 2024-08-30 NOTE — OP NOTE
Esophagogastroduodenoscopy Note    Patient:   Francis Shirley    :    1940    Facility:   [facility] [Outpatient]   Referring/PCP: Darnell Pace MD    Procedure:   Esophagogastroduodenoscopy   Date:     2024   Endoscopist:  Kal Matt MD     Preoperative Diagnosis:    CBD stent    Postoperative Diagnosis:  1.  Normal mucosa in the esophagus and normal gastroesophageal junction  2.  2 subepithelial lesion noted in the stomach.  3.  Normal duodenal mucosa  4.  Periampullary diverticulum.  5.  Biliary stent not visualized, most likely has passed spontaneously.    Anesthesia:  MAC    Estimated blood loss: None    Complications: None    Specimen: None    Instrument:     Description of Procedure:  Informed consent was obtained from the patient after explanation of the procedure including indications, description of the procedure,  benefits and possible risks and complications of the procedure, and alternatives. Questions were answered.  The patient's history was reviewed and a directed physical examination was performed prior to the procedure.    Patient was monitored throughout the procedure with pulse oximetry and periodic assessment of vital signs. Patient was sedated as noted above. With the patient in the left lateral decubitus position, the Olympus videoendoscope was placed in the patient's mouth and under direct visualization passed into the esophagus.  Visualization of the esophagus, stomach, and duodenum was performed during both introduction and withdrawal of the endoscope and retroflexed view of the proximal stomach was obtained. The scope was passed to the 2nd portion of the duodenum.  The patient tolerated the procedure well and was taken to the recovery area in good condition.    Findings: The mucosa in the esophagus is normal.  The gastroesophageal junction is normal.  Examination of the stomach revealed a normal gastric mucosa.  2

## 2024-08-30 NOTE — ANESTHESIA POSTPROCEDURE EVALUATION
[facility] Department of Anesthesiology  Post-Anesthesia Note       Name:  Francis Shirley                                  Age:  84 y.o.  MRN:  [id number]     Last Vitals & Oxygen Saturation: BP (!) 141/64   Pulse 69   Temp 96.8 °F (36 °C) (Temporal)   Resp 18   Ht 1.778 m (5' 10\")   Wt 123.8 kg (272 lb 14.4 oz)   SpO2 94%   BMI 39.16 kg/m²   Patient Vitals for the past 4 hrs:   BP Temp Temp src Pulse Resp SpO2 Height Weight   08/30/24 1134 (!) 141/64 96.8 °F (36 °C) Temporal 69 18 94 % -- --   08/30/24 1130 (!) 132/59 96.8 °F (36 °C) Temporal 68 19 94 % -- --   08/30/24 1126 137/69 -- -- 72 24 92 % -- --   08/30/24 1124 -- -- -- 69 17 92 % -- --   08/30/24 1120 (!) 119/56 -- -- 71 24 92 % -- --   08/30/24 1115 (!) 121/54 -- -- 67 16 96 % -- --   08/30/24 1110 (!) 120/55 (!) 96.7 °F (35.9 °C) Temporal 74 19 92 % -- --   08/30/24 0950 (!) 152/77 97 °F (36.1 °C) Temporal 68 18 94 % 1.778 m (5' 10\") 123.8 kg (272 lb 14.4 oz)       Level of consciousness:  Awake, alert to baseline    Respiratory: Respirations easy, no distress. Stable.    Cardiovascular: Hemodynamically stable.    Hydration: Adequate.    PONV: Adequately managed.    Post-op pain: Adequately controlled.    Post-op assessment: Tolerated anesthetic well without complication.    Complications:  None.    Saulo Shaver MD  August 30, 2024   1:03 PM

## 2024-08-30 NOTE — H&P
[facility]   Attending Pre-operative History and Physical      PROCEDURE:  EGD with stent removal    Indication:The patient is a 84 y.o. male presents for an upper endoscopy    Past Medical History:    Past Medical History:   Diagnosis Date    Anesthesia     hallucinations for prostate surgery     Arthritis     Atrial fibrillation (HCC)     CAD (coronary artery disease)     Cancer (HCC)     Kidney    DVT (deep venous thrombosis) (HCC)     CORNELIUS legs post prostate surgery 5 yrs ago    Gout     Hx of blood clots     Hyperlipemia 02/02/2023    Hypertension     Sleep apnea     does not use    Venous (peripheral) insufficiency     compression socks      Past Surgical History:    Past Surgical History:   Procedure Laterality Date    BACK SURGERY      3 surgeries total.  Lumbar 3&4,    CARDIAC CATHETERIZATION  2011    CARDIAC SURGERY      stents placed- 1 in 2000 and 2 in 2002.  3 surgeries total    CATARACT REMOVAL Bilateral     CHOLECYSTECTOMY      COLONOSCOPY      HERNIA REPAIR Left      X2.  Left flank and right inguinal    JOINT REPLACEMENT  2009    right knee replacement    KIDNEY SURGERY      tumor removed     NASAL SEPTUM SURGERY      PRESSURE ULCER DEBRIDEMENT      had a stage 4 wound on coccyx 6 years ago    PROSTATE SURGERY      2012-prostate removal due to enlargement (not cancerous)    TOTAL KNEE ARTHROPLASTY Left 09/14/2023    replacement    TUMOR REMOVAL Left     kidney    VENA CAVA FILTER PLACEMENT      5 yrs ago      Medications Prior to Admission:   Prior to Admission medications    Medication Sig Start Date End Date Taking? Authorizing Provider   omeprazole (PRILOSEC) 40 MG delayed release capsule Take 1 capsule by mouth daily   Yes ProviderRolo MD   furosemide (LASIX) 40 MG tablet Take 1 tablet by mouth daily  Patient taking differently: Take 1 tablet by mouth daily PRN 1/24/24  Yes Tobi Damon MD   chlorthalidone (HYGROTON) 25 MG tablet TAKE 1 TABLET BY MOUTH EVERY DAY 1/22/24  Yes Phan  patient/guardian. Patient/guardian expresses understanding.

## 2024-10-28 NOTE — PROGRESS NOTES
[facility]   Cardiology Note    Francis Shirley  1940 84 y.o.      10/28/24      CC: Darnell Hernandez MD      HPI: This is a 84 y.o. gentleman with a history of coronary disease with multiple stents.  He also has a history of inferior vena cava thrombosis.  Attempt to remove it was unsuccessful but it did cannulized and open the channel which has helped dramatically his lower extremity swelling.    Patient is here today for his 6 month follow up.  Doing well with no chest pain.  Recently cut down a tree bushes cleaning up the place with no chest pain or shortness of breath.  Struggles with the legs because of circulation issues      Past Medical History:   Diagnosis Date    Anesthesia     hallucinations for prostate surgery     Arthritis     Atrial fibrillation (HCC)     CAD (coronary artery disease)     Cancer (HCC)     Kidney    DVT (deep venous thrombosis) (HCC)     CORNELIUS legs post prostate surgery 5 yrs ago    Gout     Hx of blood clots     Hyperlipemia 02/02/2023    Hypertension     Sleep apnea     does not use    Venous (peripheral) insufficiency     compression socks     Past Surgical History:   Procedure Laterality Date    BACK SURGERY      3 surgeries total.  Lumbar 3&4,    CARDIAC CATHETERIZATION  2011    CARDIAC SURGERY      stents placed- 1 in 2000 and 2 in 2002.  3 surgeries total    CATARACT REMOVAL Bilateral     CHOLECYSTECTOMY      COLONOSCOPY      HERNIA REPAIR Left      X2.  Left flank and right inguinal    JOINT REPLACEMENT  2009    right knee replacement    KIDNEY SURGERY      tumor removed     NASAL SEPTUM SURGERY      PRESSURE ULCER DEBRIDEMENT      had a stage 4 wound on coccyx 6 years ago    PROSTATE SURGERY      2012-prostate removal due to enlargement (not cancerous)    TOTAL KNEE ARTHROPLASTY Left 09/14/2023    replacement    TUMOR REMOVAL Left     kidney    UPPER GASTROINTESTINAL ENDOSCOPY N/A 8/30/2024    ESOPHAGOGASTRODUODENOSCOPY performed by Kal Matt

## 2024-10-29 ENCOUNTER — OFFICE VISIT (OUTPATIENT)
Dept: CARDIOLOGY CLINIC | Age: 84
End: 2024-10-29
Payer: MEDICARE

## 2024-10-29 VITALS
SYSTOLIC BLOOD PRESSURE: 132 MMHG | HEART RATE: 81 BPM | BODY MASS INDEX: 40.23 KG/M2 | WEIGHT: 281 LBS | OXYGEN SATURATION: 95 % | DIASTOLIC BLOOD PRESSURE: 68 MMHG | HEIGHT: 70 IN

## 2024-10-29 DIAGNOSIS — I48.0 PAF (PAROXYSMAL ATRIAL FIBRILLATION) (HCC): ICD-10-CM

## 2024-10-29 DIAGNOSIS — I25.119 CORONARY ARTERY DISEASE INVOLVING NATIVE CORONARY ARTERY OF NATIVE HEART WITH ANGINA PECTORIS (HCC): ICD-10-CM

## 2024-10-29 DIAGNOSIS — I10 ESSENTIAL HYPERTENSION, BENIGN: ICD-10-CM

## 2024-10-29 DIAGNOSIS — I82.412 DVT FEMORAL (DEEP VENOUS THROMBOSIS) WITH THROMBOPHLEBITIS, LEFT (HCC): Primary | ICD-10-CM

## 2024-10-29 PROCEDURE — 1123F ACP DISCUSS/DSCN MKR DOCD: CPT | Performed by: INTERNAL MEDICINE

## 2024-10-29 PROCEDURE — G8417 CALC BMI ABV UP PARAM F/U: HCPCS | Performed by: INTERNAL MEDICINE

## 2024-10-29 PROCEDURE — 3078F DIAST BP <80 MM HG: CPT | Performed by: INTERNAL MEDICINE

## 2024-10-29 PROCEDURE — 1159F MED LIST DOCD IN RCRD: CPT | Performed by: INTERNAL MEDICINE

## 2024-10-29 PROCEDURE — 3075F SYST BP GE 130 - 139MM HG: CPT | Performed by: INTERNAL MEDICINE

## 2024-10-29 PROCEDURE — 1036F TOBACCO NON-USER: CPT | Performed by: INTERNAL MEDICINE

## 2024-10-29 PROCEDURE — G8484 FLU IMMUNIZE NO ADMIN: HCPCS | Performed by: INTERNAL MEDICINE

## 2024-10-29 PROCEDURE — 93000 ELECTROCARDIOGRAM COMPLETE: CPT | Performed by: INTERNAL MEDICINE

## 2024-10-29 PROCEDURE — G8427 DOCREV CUR MEDS BY ELIG CLIN: HCPCS | Performed by: INTERNAL MEDICINE

## 2024-10-29 PROCEDURE — 99214 OFFICE O/P EST MOD 30 MIN: CPT | Performed by: INTERNAL MEDICINE

## 2024-12-06 DIAGNOSIS — I25.10 CAD IN NATIVE ARTERY: ICD-10-CM

## 2024-12-06 NOTE — TELEPHONE ENCOUNTER
Last OV: 10/29/24  Next OV: X due yearly  Last refill: 9/18/23 #180 5 R/F  Most recent Labs: 7/16/24 in care everywhere  Last EKG (if needed): 10/29/24

## 2024-12-09 ENCOUNTER — TELEPHONE (OUTPATIENT)
Dept: CARDIOLOGY CLINIC | Age: 84
End: 2024-12-09

## 2024-12-09 RX ORDER — ISOSORBIDE MONONITRATE 60 MG/1
60 TABLET, EXTENDED RELEASE ORAL 2 TIMES DAILY
Qty: 180 TABLET | Refills: 3 | Status: SHIPPED | OUTPATIENT
Start: 2024-12-09

## 2024-12-09 NOTE — TELEPHONE ENCOUNTER
What are your BP readings within the last week?    12/8/24 AM - 198/110  12/9/24 AM - 205/103  What blood pressure medications are you taking and when?    (dosage and frequency)   Labetalol - 300 MG - take 1 tablet by mouth 2x/day    Patient is concerned he is running too high. Takes his medication when he gets up and before bed. Roughly 9am and 11pm.

## 2024-12-09 NOTE — TELEPHONE ENCOUNTER
Francis can be reached at [phone]    Blood Pressure Problems:    Is your BP too low - Hypotension (Low BP)?       Or too high - Hypertension (High BP)?    High  What are your BP readings within the last week?    12/8/24 AM - 198/110  12/9/24 AM - 205/103  What blood pressure medications are you taking and when?    (dosage and frequency)   Labetalol - 300 MG - take 1 tablet by mouth 2x/day  What symptoms are you experiencing?  Headaches?  NO  Dizziness? (All the time or with standing/changing positions?)   NO  Have you passed out?  NO  Chest Pain?   NO  Difficulty breathing?  NO  Blurred vision?  NO  Anxiety?  NO  Are you switching positions slowly?    Are you spacing out your medications?    Please review all medications - there are other medications that could be contributing besides just BP medications (example, pain medications).    What other medications are you taking?

## 2024-12-09 NOTE — TELEPHONE ENCOUNTER
Last office visit 10/29/24 notes states:    Essential Hypertension  130/68, controlled  Taking Chlorthalidone 25mg daily & labetalol 300mg bid  Taking isosorbide 60mg daily and if remains uncontrolled will try Doxazosin     Please advise.

## 2024-12-09 NOTE — TELEPHONE ENCOUNTER
Francis called in wanting an update, he left a message this morning. He would like to be contacted today.      He can be reached at [phone].

## 2024-12-16 RX ORDER — CLOPIDOGREL BISULFATE 75 MG/1
TABLET ORAL
Qty: 90 TABLET | Refills: 3 | Status: SHIPPED | OUTPATIENT
Start: 2024-12-16

## 2024-12-16 NOTE — TELEPHONE ENCOUNTER
Last OV: 10/29/24  Next OV: X due yearly   Last refill: 11/13/23 #90 3 refills  Most recent Labs: 07/16/24 in care everywhere  Last EKG (if needed): 10/29/24

## 2025-01-14 NOTE — PROGRESS NOTES
[address] [facility] - Cardiology      Chief Complaint: \"I had chest pressure taking out the garbage. \"     History of present illness:   Ceci Kiser is a 79 y.o. male with past medical history significant for CAD s/p multiple stents, atrial fibrillation, hypertension, DVT and hyperlipidemia. Patient has had over the last several years, he's had several stents placed for symptoms and significant obstructive disease found on coronary angiography. Following with Dr. Jose Luis Dupree for dx of left kidney CA. Today, he reports that last week on Thursday night while taking out his garbage cans, started with chest pressure, did not radiate, no associated symptoms. He took his [employer] with resolution and no recurrence. He states he has also had throat burning recently which was his angina prior. He reports medical therapy compliance and feels he is tolerating. He denies any DAPT abnormal bruising or bleeding. Patient denies dyspnea at rest, PND, orthopnea, palpitations, lightheadedness, weight changes, changes in LE edema, and syncope. He has also been diagnosed with MILY-severe, nocturnal hypoxia per sleep study 8/14/20. Follows with Pulmonology at [facility]. Past Medical History:   Diagnosis Date    Anesthesia     hallucinations for prostate surgery     Arthritis     Atrial fibrillation ([facility] [address]     CAD (coronary artery disease)     Cancer (HCC)     Kidney    DVT [facility]deep venous thrombosis) [facility])     CORNELIUS legs post prostate surgery 5 yrs ago    Hx of blood clots     Hypertension      Past Surgical History:   Procedure Laterality Date    BACK SURGERY      3 surgeries total.  Lumbar 3&4,    CARDIAC CATHETERIZATION  2011    CARDIAC SURGERY      stents placed- 1 in 2000 and 2 in 2002.  3 surgeries total    CATARACT REMOVAL Bilateral     CHOLECYSTECTOMY      COLONOSCOPY      HERNIA REPAIR Left      X2.   Left flank and right inguinal    JOINT REPLACEMENT  2009    right knee AMBULATORY CASE MANAGEMENT NOTE    Names and Relationships of Patient/Support Persons: Contact: Emi Ball; Relationship: Self -     CCM Interim Update    I spoke with the patient on the phone today in an attempt to consent her for [facility] services. Patient consented.    Patient lives in [facility]. She does not seem to like her living arrangement although she seems to have other options with her children that she does not want to do.    Her concerns/needs currently are as follows:  1-Medication clarification with PCP. Patient states she is only one medication currently.  2-It appears the patient was in need of an echo and didn't go to the appointment.  3-She was prescribed cream for hemorrhoids but it was going to cost the patient $300. She did not get the medication.  4-She pays someone $20 to come in and assist her with a shower once a week. The rest of the week she just washes herself off. She would like someone to help her with a shower twice a week but can not pay any more money.  5-Patient with HTN diagnosis. Ensure she is checking her BP twice a day and keeping a log.    See Full Assessment Below:  Adult Patient Profile  Questions/Answers      Flowsheet Row Most Recent Value   Symptoms/Conditions Managed at Home cardiovascular, endocrine, gastrointestinal, HEENT (head, eyes, ears, nose, throat), musculoskeletal, other (see comments)   Barriers to Managing Health understanding health advice, stress of chronic illness   Additional Documentation Gastrointestinal Symptoms/Conditions Management (Group), HEENT Symptoms/Conditions Management (Group)   Cardiovascular Symptoms/Conditions myocardial infarction, hypertension   Cardiovascular Management Strategies routine screening, medication therapy   Cardiovascular Self-Management Outcome not well   Cardiovascular Comment Patient's blood pressure has been elevated at times. She alos c/o SOA at times. She was scheduled for an echo but she did not  get it done. Patient is unsure which medications she is on.   Endocrine Symptoms/Conditions Vitamin D deficiency   Endocrine Management Strategies routine screening, medication therapy   Endocrine Self-Management Outcome unsure   Endocrine Comment Patient denies being on medication to treat. Will have to clarify with PCP   Gastrointestinal Symptoms/Conditions reflux/heartburn   Gastrointestinal Management Strategies medication therapy   Gastrointestinal Self-Management Outcome unsure   Gastrointestinal Comment Patient denies being on medication to treat. Will have to clarify with PCP   HEENT Symptoms/Conditions ear problem(s)  [difficulty hearing]   HEENT Management Strategies routine screening   HEENT Self-Management Outcome not well   Musculoskeletal Symptoms/Conditions osteoporosis, unsteady gait, scoliosis, mobility limited   Musculoskeletal Management Strategies routine screening, medical device   Musculoskeletal Self-Management Outcome not well   Musculoskeletal Comment Patient has difficulty ambulating. She must use a rollator to ambulate. She tires easily and has difficulty with her legs   Taking Medications Not Prescribed no   Missed Doses of Prescribed Medications During Past Week yes   Taken Prescribed Medications at Different Time or Schedule During Past Week no   Taken More or Less Medication Than Prescribed yes   Barriers to Taking Medication as Prescribed don't know what it is for, don't think I need it, forget to take it, medication side effects, unable to afford medicine   Current Living Arrangements assisted living facility        SDOH updated and reviewed with the patient during this program:  Financial Resource Strain: Low Risk  (1/14/2025)    Overall Financial Resource Strain (CARDIA)     Difficulty of Paying Living Expenses: Not very hard      --     Food Insecurity: No Food Insecurity (1/14/2025)    Hunger Vital Sign     Worried About Running Out of Food in the Last Year: Never true     Ran  Out of Food in the Last Year: Never true      --     Housing Stability: Unknown (1/14/2025)    Housing Stability Vital Sign     Unable to Pay for Housing in the Last Year: No     Homeless in the Last Year: No      --     Transportation Needs: No Transportation Needs (1/14/2025)    PRAPARE - Transportation     Lack of Transportation (Medical): No     Lack of Transportation (Non-Medical): No      --     Utilities: Not At Risk (1/14/2025)    [facility] Utilities     Threatened with loss of utilities: No     Care Coordination    To be determined    Ronel MARQUIS  Ambulatory Case Management    1/14/2025, 16:25 EST   by mouth 3 times daily      atenolol (TENORMIN) 50 MG tablet TAKE 1 TABLET BY MOUTH TWICE A  tablet 3    valsartan-hydroCHLOROthiazide (DIOVAN-HCT) 160-25 MG per tablet Take 1 tablet by mouth daily In AM 90 tablet 5    clopidogrel (PLAVIX) 75 MG tablet TAKE 1 TABLET BY MOUTH EVERY DAY 90 tablet 3    Magnesium Gluconate 27.5 MG TABS Take 500 mg by mouth      famotidine (PEPCID) 20 MG tablet Take 20 mg by mouth 2 times daily      b complex vitamins capsule Take 1 capsule by mouth daily      aspirin 81 MG tablet Take 81 mg by mouth daily      Cholecalciferol (VITAMIN D3) 2000 UNITS CAPS Take 1 capsule by mouth daily       calcium carbonate (OSCAL) 500 MG TABS tablet Take 500 mg by mouth daily      nitroGLYCERIN (NITROSTAT) 0.4 MG SL tablet Place 1 tablet under the tongue every 5 minutes as needed for Chest pain 25 tablet 3     No current facility-administered medications for this visit. Labs:   Lab Results   Component Value Date    WBC 8.7 01/29/2019    HGB 14.8 01/29/2019    HCT 44.4 01/29/2019    MCV 90.1 01/29/2019     01/29/2019     Lab Results   Component Value Date     01/29/2019    K 3.8 01/29/2019     01/29/2019    CO2 25 01/29/2019    BUN 19 01/29/2019    CREATININE 1.0 01/29/2019    GLUCOSE 97 01/29/2019    CALCIUM 9.3 01/29/2019      Lab Results   Component Value Date    TRIG 113 07/07/2019    HDL 36 07/07/2019    HDL 34 07/20/2011    LDLCALC 39 07/07/2019    LDLDIRECT 116 02/14/2019    LABVLDL 18 02/14/2019       EKG    11/16/20  NSR, ~68 bpm.           CATH 12/9/16  1. Two-vessel coronary disease with two tandem lesions in the 80-90% range involving the proximal circumflex. 2. An 80% eccentric mid RCA lesion. 3. Normal left ventricular size and systolic function. 4. Normal hemodynamics.     Successful stenting of the proximal circumflex. Two tandem lesions were reduced to 0% using a 3.0-mm x 34-mm Synergy stent.  The mid right coronary artery was stented using a 4.0-mm x 16-mm Synergy stent lesion reduced from 80% to 0%. ECHO 12/9/2016   Normal LV size and systolic function: EF is  32%. Grade I diastolic  dysfunction.   Trace mitral regurgitation is present.   The left atrium is normal in size.   Normal right ventricular size and function.   There is trace to mild tricuspid regurgitation with RVSP estimated at 40  mmHg. which is mildly elevated.   Trivial pulmonic regurgitation present.     Coronary Angiogram and PCI: 12/2016  1. Two-vessel coronary disease with two tandem lesions in the 80-90% range involving the proximal circumflex. 2. An 80% eccentric mid RCA lesion. 3. Normal left ventricular size and systolic function. 4. Normal hemodynamics  Successful stenting of the proximal circumflex. Two tandem lesions were reduced to 0% using a 3.0-mm x 34-mm Synergy stent. The mid right coronary artery was stented using a 4.0-mm x 16-mm Synergy stent lesion reduced from 80% to 0%. ECHO: 1/27/2019  Normal left ventricle size and systolic function with an estimated ejection  fraction of 55-60%. No regional wall motion abnormalities are seen. Mild concentric left ventricular hypertrophy. Normal function of all valves. [facility]: 1/28/2019  CORONARY ANGIOGRAM:  1. There is single vessel disease in this right dominant circulation. 2.  Left main:  Free of disease. 3.  LAD in the proximal segment has 80% focal eccentric stenosis. The  first diagonal upper branch has a 95% stenosis. The rest of the vessel  has no major obstructive disease. 4.  Left circumflex artery. The previously stented site is widely  patent. The OM1 is patent as well. 5.  The right coronary artery also is a dominant vessel. The previously  placed stents are widely patent. There is mild luminal irregularities  in the distal RCA.     CONCLUSION:  1. Single vessel coronary artery disease with 80% proximal LAD and 95%  proximal D1 lesions.   2.  Normal left ventricular size and systolic function. 3.  Normal hemodynamics. TECHNICAL COMMENTS:  The guide engaged the ostium well and provided good  support. Direct stenting of the proximal LAD was made. Serial  inflation up to 16 atmospheres which corresponds to a vessel size of  approximately 3.2. Final angiography 0% residual.  Next, the wire was  removed and passed into the diagonal after predilatation. A 2.25 x  12-mm stent was deployed and postdilated to a vessel size of 2.5. Followup angiography shows 0% residual.    CONCLUSION:  1. Successful stenting of the proximal LAD lesion reduced from 80% to 0%. A 3.0 x 15 ISAIAS stent used. 2.  Successful stenting of the first diagonal.  A 2.25 x 12 ISAIAS stent used to a final diameter of 2.5. Assessment and Plan:    CAD. Cath 10/2006: previous stents to proximal RCA, prox Circumfles and mid LAD  Non obstructive CAD of mid-RCA and Diag 3  12/2016: stenting to mid RCA and proximal circumflex  1/2019: stent to proximal LAD and 1st diagonal   Presented in different ways each time  Today, he reports anginal symptoms with chest pressure and throat burning. He is on DAPT with Plavix and aspirin   He is on atanolol, diovan-HCT. We discussed proceeding with [facility] to further assess progressive CAD tomorrow with possible R radial approach and possible intervention. He is agreeable   No iodine allergy   No medications need held prior  BMP, CBC today    I had a detail discussion with the patient and the family members regarding the risk and benefit of the procedures. I explained to them the details of the procedure. I explained to them that the procedure will be performed using moderate sedation and local anaesthesia using lidocaine. I explained any risk of bleeding, perforation of the vessel, stroke, myocardial infarction or death. The patient and the family members understood the risk and benefits and the details of procedure and would like to go ahead with the procedure.  The patient gave informed

## 2025-01-20 RX ORDER — CHLORTHALIDONE 25 MG/1
25 TABLET ORAL DAILY
Qty: 90 TABLET | Refills: 3 | Status: SHIPPED | OUTPATIENT
Start: 2025-01-20

## 2025-01-20 NOTE — TELEPHONE ENCOUNTER
Last OV: 10/29/24  Next OV: X due yearly  Last refill: 1/22/24 #90 3 R/F  Most recent Labs: 7/16/24 in care everywhere  Last EKG (if needed):10/29/24

## 2025-03-05 ENCOUNTER — TELEPHONE (OUTPATIENT)
Dept: CARDIOLOGY CLINIC | Age: 85
End: 2025-03-05

## 2025-03-05 NOTE — TELEPHONE ENCOUNTER
Patient called in wanting to know if he should be seen sooner than when he is due for his yearly f/u.   Was last seen by Dr. Damon 5/29/24.   Dr. Chisholm 10/29/24.  He didn't want to schedule until he had clarification.     He states he just had a CAT scan a few days ago and it showed he had an enlarged heart and severe calcium in his heart.   Doesn't have any symptoms.     Please advise.     Callback: [phone]

## 2025-03-06 NOTE — PROGRESS NOTES
Interventional Cardiology Consultation     Francis Shirley  1940    PCP: Darnell Pace MD  Referring Physician: Dr Christianson/Dr. Chisholm  Reason for Referral: IVC filter removal  Chief Complaint: \"I had a scan\"     Subjective:     History of Present Illness: The patient is 84 y.o. male with a past medical history significant for coronary artery disease, venous insufficiency, atrial fibrillation, DVT s/p IVC filter, hypertension, and hyperlipidemia. He had increased swelling in both legs with recurrent cellulitis. He keeps his legs wrapped and wears compression stockings as much possible. 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 There is a robust channel from the external iliac on the right to the IVC likely a large collateral and unsuccessful attempt at removing IVC filter. Patient here today for follow up. Reports overall he is doing okay. Recently completed a CT scan with incidental finding of \"large heart\". Patient does endorse intermittent chest pain radiating to his shoulder occurring at random. Reports palpitations while laying in bed at night.       Past Medical History:   Diagnosis Date    Anesthesia     hallucinations for prostate surgery     Arthritis     Atrial fibrillation (HCC)     CAD (coronary artery disease)     Cancer (HCC)     Kidney    DVT (deep venous thrombosis) (HCC)     CORNELIUS legs post prostate surgery 5 yrs ago    Gout     Hx of blood clots     Hyperlipemia 02/02/2023    Hypertension     Sleep apnea     does not use    Venous (peripheral) insufficiency     compression socks     Past Surgical History:   Procedure Laterality Date    BACK SURGERY      3 surgeries total.  Lumbar 3&4,    CARDIAC CATHETERIZATION  2011    CARDIAC SURGERY      stents placed- 1 in 2000 and 2 in 2002.  3 surgeries total    CATARACT REMOVAL Bilateral     CHOLECYSTECTOMY      COLONOSCOPY      HERNIA REPAIR Left      X2.  Left

## 2025-03-06 NOTE — TELEPHONE ENCOUNTER
No recent CT available for review in patient chart so unsure what patient is referring to.   Can offer patient earlier appt with Dr. Damon or Dr. Chisholm at next available to discuss concerns.   Thanks

## 2025-03-06 NOTE — TELEPHONE ENCOUNTER
Francis scheduled with [facility] 3/7 at 3:35p. He does have copy of scan and report, will bring into OV tmrw.

## 2025-03-07 ENCOUNTER — OFFICE VISIT (OUTPATIENT)
Dept: CARDIOLOGY CLINIC | Age: 85
End: 2025-03-07
Payer: MEDICARE

## 2025-03-07 VITALS
BODY MASS INDEX: 39.8 KG/M2 | HEIGHT: 70 IN | SYSTOLIC BLOOD PRESSURE: 136 MMHG | HEART RATE: 69 BPM | OXYGEN SATURATION: 95 % | DIASTOLIC BLOOD PRESSURE: 74 MMHG | WEIGHT: 278 LBS

## 2025-03-07 DIAGNOSIS — I25.10 CAD IN NATIVE ARTERY: Primary | ICD-10-CM

## 2025-03-07 DIAGNOSIS — R07.9 CHEST PAIN, UNSPECIFIED TYPE: ICD-10-CM

## 2025-03-07 PROCEDURE — G8427 DOCREV CUR MEDS BY ELIG CLIN: HCPCS | Performed by: INTERNAL MEDICINE

## 2025-03-07 PROCEDURE — 1036F TOBACCO NON-USER: CPT | Performed by: INTERNAL MEDICINE

## 2025-03-07 PROCEDURE — 93000 ELECTROCARDIOGRAM COMPLETE: CPT | Performed by: INTERNAL MEDICINE

## 2025-03-07 PROCEDURE — 3078F DIAST BP <80 MM HG: CPT | Performed by: INTERNAL MEDICINE

## 2025-03-07 PROCEDURE — G8417 CALC BMI ABV UP PARAM F/U: HCPCS | Performed by: INTERNAL MEDICINE

## 2025-03-07 PROCEDURE — 3075F SYST BP GE 130 - 139MM HG: CPT | Performed by: INTERNAL MEDICINE

## 2025-03-07 PROCEDURE — 99214 OFFICE O/P EST MOD 30 MIN: CPT | Performed by: INTERNAL MEDICINE

## 2025-03-07 PROCEDURE — 1123F ACP DISCUSS/DSCN MKR DOCD: CPT | Performed by: INTERNAL MEDICINE

## 2025-03-07 PROCEDURE — 1159F MED LIST DOCD IN RCRD: CPT | Performed by: INTERNAL MEDICINE

## 2025-03-07 RX ORDER — FAMOTIDINE 40 MG/1
40 TABLET, FILM COATED ORAL DAILY
COMMUNITY

## 2025-03-07 RX ORDER — ROSUVASTATIN CALCIUM 5 MG/1
TABLET, COATED ORAL
COMMUNITY
Start: 2025-01-21

## 2025-03-18 ENCOUNTER — HOSPITAL ENCOUNTER (OUTPATIENT)
Age: 85
Discharge: HOME | End: 2025-03-20
Attending: INTERNAL MEDICINE
Payer: MEDICARE

## 2025-03-18 ENCOUNTER — HOSPITAL ENCOUNTER (OUTPATIENT)
Dept: NUCLEAR MEDICINE | Age: 85
Discharge: HOME | End: 2025-03-18
Attending: INTERNAL MEDICINE
Payer: MEDICARE

## 2025-03-18 VITALS — HEIGHT: 70 IN | BODY MASS INDEX: 39.37 KG/M2 | WEIGHT: 275 LBS

## 2025-03-18 DIAGNOSIS — I25.10 CAD IN NATIVE ARTERY: ICD-10-CM

## 2025-03-18 DIAGNOSIS — R07.9 CHEST PAIN, UNSPECIFIED TYPE: ICD-10-CM

## 2025-03-18 LAB
ECHO BSA: 2.48 M2
NUC REST DIASTOLIC VOLUME INDEX: 134 ML/M2
NUC REST EJECTION FRACTION: 66 %
NUC REST SYSTOLIC VOLUME INDEX: 46 ML/M2
STRESS BASELINE DIAS BP: 69 MMHG
STRESS BASELINE HR: 74 BPM
STRESS BASELINE SYS BP: 143 MMHG
STRESS ESTIMATED WORKLOAD: 1 METS
STRESS EXERCISE DUR MIN: 1 MIN
STRESS EXERCISE DUR SEC: 40 SEC
STRESS O2 SAT PEAK: 100 %
STRESS O2 SAT REST: 97 %
STRESS PEAK DIAS BP: 70 MMHG
STRESS PEAK SYS BP: 145 MMHG
STRESS PERCENT HR ACHIEVED: 64 %
STRESS POST PEAK HR: 88 BPM
STRESS RATE PRESSURE PRODUCT: NORMAL BPM*MMHG
STRESS TARGET HR: 137 BPM

## 2025-03-18 PROCEDURE — A9502 TC99M TETROFOSMIN: HCPCS | Performed by: INTERNAL MEDICINE

## 2025-03-18 PROCEDURE — 93016 CV STRESS TEST SUPVJ ONLY: CPT | Performed by: INTERNAL MEDICINE

## 2025-03-18 PROCEDURE — 3430000000 HC RX DIAGNOSTIC RADIOPHARMACEUTICAL: Performed by: INTERNAL MEDICINE

## 2025-03-18 PROCEDURE — 6360000002 HC RX W HCPCS: Performed by: INTERNAL MEDICINE

## 2025-03-18 PROCEDURE — 78452 HT MUSCLE IMAGE SPECT MULT: CPT | Performed by: INTERNAL MEDICINE

## 2025-03-18 PROCEDURE — 78452 HT MUSCLE IMAGE SPECT MULT: CPT

## 2025-03-18 PROCEDURE — 93018 CV STRESS TEST I&R ONLY: CPT | Performed by: INTERNAL MEDICINE

## 2025-03-18 PROCEDURE — 93017 CV STRESS TEST TRACING ONLY: CPT

## 2025-03-18 RX ORDER — REGADENOSON 0.08 MG/ML
0.4 INJECTION, SOLUTION INTRAVENOUS
Status: COMPLETED | OUTPATIENT
Start: 2025-03-18 | End: 2025-03-18

## 2025-03-18 RX ADMIN — REGADENOSON 0.4 MG: 0.08 INJECTION, SOLUTION INTRAVENOUS at 12:03

## 2025-03-18 RX ADMIN — TETROFOSMIN 32.6 MILLICURIE: 1.38 INJECTION, POWDER, LYOPHILIZED, FOR SOLUTION INTRAVENOUS at 12:06

## 2025-03-18 RX ADMIN — TETROFOSMIN 13.4 MILLICURIE: 1.38 INJECTION, POWDER, LYOPHILIZED, FOR SOLUTION INTRAVENOUS at 10:57

## 2025-03-19 ENCOUNTER — APPOINTMENT (OUTPATIENT)
Dept: NUCLEAR MEDICINE | Age: 85
End: 2025-03-19
Attending: INTERNAL MEDICINE
Payer: MEDICARE

## 2025-03-19 ENCOUNTER — TELEPHONE (OUTPATIENT)
Dept: CARDIOLOGY CLINIC | Age: 85
End: 2025-03-19

## 2025-03-19 ENCOUNTER — RESULTS FOLLOW-UP (OUTPATIENT)
Age: 85
End: 2025-03-19

## 2025-03-19 DIAGNOSIS — R94.39 ABNORMAL CARDIOVASCULAR STRESS TEST: ICD-10-CM

## 2025-03-19 DIAGNOSIS — I25.10 CAD IN NATIVE ARTERY: Primary | ICD-10-CM

## 2025-03-19 NOTE — TELEPHONE ENCOUNTER
Date of Procedure: Thursday 4/3/25 @ [facility] with Dr. Damon     Time of arrival: 11:30 am     Procedure time: 1:00 pm     Called and spoke to Francis and he is agreeable to date and time. Reviewed and sent Francis a Onf[employer] message with his procedure date, time and instructions and he verbalized understanding. Encouraged to call with any questions or concerns.     Published on [employer] and e-mail to Susan.

## 2025-03-19 NOTE — TELEPHONE ENCOUNTER
[place]  Please assist with scheduling patient for left heart catheterization with possible at [facility] with Dr. Damon  Orders are in  Thanks!    Please go for blood work one week prior to your procedure.       The morning of your procedure you will park in the hospital parking lot and report directly to the registration desk for check in.    Pre-Procedure Instructions   You will need to fast for at least 8 hours prior to procedure. No caffeine the morning of.   Hold your diuretic, furosemide (Lasix) the morning of procedure.   Hold all diabetic medications including, Metfomin.  If you take Lantus/Levemir only take ½ your normal dose the evening before.  All other medications can be taken in the morning with sips of water.   You will need to take 325 mg aspirin the morning of.  If you are currently taking 81 mg please take 4 tablets that morning.   Do not use any lotions, creams or perfume the morning of procedure.   Pre-procedure lab work will need to be completed 5-7 days prior to procedure.   Please have a responsible adult to drive you home after procedure. We advise you have someone to stay with you for 24 hours following procedure for precautionary measures. Depending on procedure you may require an overnight stay.   Cath lab will provide you with all post procedure instructions.     If you have any questions regarding the procedure itself or medications, please call [phone] and ask to speak with a nurse.

## 2025-03-28 LAB
ANION GAP SERPL CALCULATED.3IONS-SCNC: 6 MMOL/L (ref 5–15)
BASOPHILS ABSOLUTE: 0 X10(3)/UL (ref 0–0.1)
BASOPHILS RELATIVE PERCENT: 0.7 %
BUN BLDV-MCNC: 21 MG/DL (ref 9–20)
CALCIUM SERPL-MCNC: 9.9 MG/DL (ref 8.6–10.3)
CHLORIDE BLD-SCNC: 101 MMOL/L (ref 98–107)
CO2: 33.8 MMOL/L (ref 22–30)
CORRECTED WBC: 7.1 X10(3)/UL (ref 4.8–10.8)
CREAT SERPL-MCNC: 1.23 MG/DL (ref 0.66–1.25)
EGFR (CKD-EPI): 58
EOSINOPHILS ABSOLUTE: 0.2 X10(3)/UL (ref 0–0.6)
EOSINOPHILS RELATIVE PERCENT: 2.2 %
ERYTHROCYTE [DISTWIDTH] IN BLOOD BY AUTOMATED COUNT: 15 % (ref 10.9–14.1)
GLUCOSE BLD-MCNC: 114 MG/DL (ref 70–99)
HCT VFR BLD CALC: 40.7 % (ref 39–52)
HEMOGLOBIN: 13.1 GM/DL (ref 14–18)
IMMATURE GRANS (ABS): 0.03 X10(3)/UL (ref 0–0.05)
IMMATURE GRANULOCYTES %: 0.4 %
IPF: 1 % (ref 1–7)
LYMPHOCYTES ABSOLUTE: 2.2 X10(3)/UL (ref 0.9–4.3)
LYMPHOCYTES RELATIVE PERCENT: 30.8 %
Lab: 2.1
MCH RBC QN AUTO: 30 PG (ref 27–31)
MCHC RBC AUTO-ENTMCNC: 32 GM/DL (ref 33–36)
MCV RBC AUTO: 92 FL (ref 80–96)
MONOCYTES ABSOLUTE: 0.6 X10(3)/UL (ref 0–1)
MONOCYTES RELATIVE PERCENT: 7.8 %
NEUTROPHILS ABSOLUTE: 4.1 X10(3)/UL (ref 2–8)
NEUTROPHILS RELATIVE PERCENT: 58.1 %
NRBC ABSOLUTE: 0 X10(3)/UL (ref 0–0.01)
NRBC AUTO PCT: 0 /100 WBCS (ref 0–0.1)
PLATELET # BLD: 150 X10(3)/UL (ref 140–440)
PMV BLD AUTO: 9.3 FL (ref 6.6–10.4)
POTASSIUM SERPL-SCNC: 3.5 MMOL/L (ref 3.5–5.1)
RBC # BLD: 4.42 X10(6)/UL (ref 4.2–5.8)
SODIUM BLD-SCNC: 141 MMOL/L (ref 137–145)
WBC # BLD: 7.1 X10(3)/UL (ref 4.8–10.8)

## 2025-04-01 NOTE — TELEPHONE ENCOUNTER
Last OV: 3/7/25 - [facility]  Next OV: Surgery 4/3 with Nelson / f/u after procedure  Last Labs: 3/28/25  Last EKG: 3/7/25   Last Filled: 1/21/25 Historical Provider

## 2025-04-02 RX ORDER — ROSUVASTATIN CALCIUM 5 MG/1
TABLET, COATED ORAL
Qty: 15 TABLET | Refills: 3 | Status: SHIPPED | OUTPATIENT
Start: 2025-04-02

## 2025-04-03 ENCOUNTER — HOSPITAL ENCOUNTER (OUTPATIENT)
Age: 85
Setting detail: OUTPATIENT SURGERY
Discharge: HOME | End: 2025-04-03
Attending: INTERNAL MEDICINE | Admitting: INTERNAL MEDICINE
Payer: MEDICARE

## 2025-04-03 VITALS
HEART RATE: 67 BPM | HEIGHT: 70 IN | BODY MASS INDEX: 40.37 KG/M2 | SYSTOLIC BLOOD PRESSURE: 156 MMHG | DIASTOLIC BLOOD PRESSURE: 87 MMHG | TEMPERATURE: 97.8 F | WEIGHT: 282 LBS | RESPIRATION RATE: 18 BRPM | OXYGEN SATURATION: 97 %

## 2025-04-03 DIAGNOSIS — R94.39 ABNORMAL CARDIOVASCULAR STRESS TEST: ICD-10-CM

## 2025-04-03 LAB — ECHO BSA: 2.51 M2

## 2025-04-03 PROCEDURE — 6360000004 HC RX CONTRAST MEDICATION: Performed by: INTERNAL MEDICINE

## 2025-04-03 PROCEDURE — C1894 INTRO/SHEATH, NON-LASER: HCPCS | Performed by: INTERNAL MEDICINE

## 2025-04-03 PROCEDURE — 2709999900 HC NON-CHARGEABLE SUPPLY: Performed by: INTERNAL MEDICINE

## 2025-04-03 PROCEDURE — 93454 CORONARY ARTERY ANGIO S&I: CPT | Performed by: INTERNAL MEDICINE

## 2025-04-03 PROCEDURE — 99152 MOD SED SAME PHYS/QHP 5/>YRS: CPT | Performed by: INTERNAL MEDICINE

## 2025-04-03 PROCEDURE — 7100000010 HC PHASE II RECOVERY - FIRST 15 MIN: Performed by: INTERNAL MEDICINE

## 2025-04-03 PROCEDURE — 6360000002 HC RX W HCPCS: Performed by: INTERNAL MEDICINE

## 2025-04-03 PROCEDURE — 7100000011 HC PHASE II RECOVERY - ADDTL 15 MIN: Performed by: INTERNAL MEDICINE

## 2025-04-03 PROCEDURE — 2500000003 HC RX 250 WO HCPCS: Performed by: INTERNAL MEDICINE

## 2025-04-03 PROCEDURE — 93005 ELECTROCARDIOGRAM TRACING: CPT | Performed by: INTERNAL MEDICINE

## 2025-04-03 PROCEDURE — C1769 GUIDE WIRE: HCPCS | Performed by: INTERNAL MEDICINE

## 2025-04-03 PROCEDURE — 99153 MOD SED SAME PHYS/QHP EA: CPT | Performed by: INTERNAL MEDICINE

## 2025-04-03 RX ORDER — LIDOCAINE HYDROCHLORIDE 10 MG/ML
INJECTION, SOLUTION INFILTRATION; PERINEURAL PRN
Status: DISCONTINUED | OUTPATIENT
Start: 2025-04-03 | End: 2025-04-03 | Stop reason: HOSPADM

## 2025-04-03 RX ORDER — SODIUM CHLORIDE 0.9 % (FLUSH) 0.9 %
5-40 SYRINGE (ML) INJECTION EVERY 12 HOURS SCHEDULED
Status: DISCONTINUED | OUTPATIENT
Start: 2025-04-03 | End: 2025-04-03 | Stop reason: HOSPADM

## 2025-04-03 RX ORDER — MIDAZOLAM HYDROCHLORIDE 1 MG/ML
INJECTION, SOLUTION INTRAMUSCULAR; INTRAVENOUS PRN
Status: DISCONTINUED | OUTPATIENT
Start: 2025-04-03 | End: 2025-04-03 | Stop reason: HOSPADM

## 2025-04-03 RX ORDER — ACETAMINOPHEN 325 MG/1
650 TABLET ORAL EVERY 4 HOURS PRN
Status: DISCONTINUED | OUTPATIENT
Start: 2025-04-03 | End: 2025-04-03 | Stop reason: HOSPADM

## 2025-04-03 RX ORDER — SODIUM CHLORIDE 9 MG/ML
INJECTION, SOLUTION INTRAVENOUS PRN
Status: DISCONTINUED | OUTPATIENT
Start: 2025-04-03 | End: 2025-04-03 | Stop reason: HOSPADM

## 2025-04-03 RX ORDER — IOPAMIDOL 755 MG/ML
INJECTION, SOLUTION INTRAVASCULAR PRN
Status: DISCONTINUED | OUTPATIENT
Start: 2025-04-03 | End: 2025-04-03 | Stop reason: HOSPADM

## 2025-04-03 RX ORDER — SODIUM CHLORIDE 0.9 % (FLUSH) 0.9 %
5-40 SYRINGE (ML) INJECTION PRN
Status: DISCONTINUED | OUTPATIENT
Start: 2025-04-03 | End: 2025-04-03 | Stop reason: HOSPADM

## 2025-04-03 NOTE — H&P
H&P Update - see note from 3/7/25    I have reviewed the history and physical and examined the patient and find no relevant changes.   I have reviewed with the patient and/or family the risks, benefits, and alternatives to the procedure.    Pre-sedation Assessment    Patient:  Francis Shirley   :   1940  Intended Procedure: left heart catheterization     There were no vitals filed for this visit.    Nursing notes reviewed and agreed.  Medications reviewed  Allergies:   Allergies   Allergen Reactions    Cardura [Doxazosin] Other (See Comments)     TACHYCARDIA    Ciprofloxacin Palpitations    Morphine      Morphine tab-upset stomach    Statins Other (See Comments)     Muscle aches    Sulfa Antibiotics Diarrhea and Other (See Comments)     Lower extremity weakness         Pre-Procedure Assessment/Plan:  ASA 2 - Patient with mild systemic disease with no functional limitations    Mallampati Airway Assessment:  Mallampati Class I - (soft palate, fauces, uvula & anterior/posterior tonsillar pillars are visible)    Level of Sedation Plan:Mild sedation    Post Procedure plan: Return to same level of care    Tobi Damon MD FACC  General, Interventional Cardiology, and Peripheral Vascular Disease   [facility]   (C): [phone]  (O): [phone]

## 2025-04-03 NOTE — FLOWSHEET NOTE
Report received from Emilie Mejia RN at bedside. TR band intact with no hematoma or bleeding at site. Call light within reach. Daughter and wife at bedside.

## 2025-04-03 NOTE — FLOWSHEET NOTE
TR band removed and quik clot dressing with tegaderm placed.Wrist immobilizer placed. No hematoma or bleeding at site. Patient ambulated to bathroom without difficulty. Discharge instructions reviewed with patient and wife.

## 2025-04-03 NOTE — PROGRESS NOTES
1510 - Received from cath lab post [facility]. Awake and alert, TR band intact right radial with 12cc air in band.  Instructions given.    1545 - Initiating air removal from TR band per protocol.    1610 - discharge instructions given

## 2025-04-03 NOTE — DISCHARGE INSTRUCTIONS
CARDIAC ANGIOGRAM (LEFT HEART CATH) - RADIAL ACCESS    Care of your puncture site:  Remove clear bandage 24 hours after the procedure.  May shower in 24 hours  Inspect the site daily and gently clean using soap and water.  Dry thoroughly and apply a Band-Aid.    Normal Observations:  Soreness or tenderness which may last one week.  Possible bruising that could last 2 weeks.    Activity:  You may resume driving 24 hours following the procedure.  You may resume normal activity in 3 days or after the wound heals.  Avoid lifting more than 10 pounds for 3 days with affected arm.  Do not make important / legal decisions within 24 hours after procedure.    Nutrition:  Regular diet   Drink at least 8 to 10 glasses of decaffeinated, non-alcoholic fluid for the next 24 hours to flush the x-ray dye used for your angiogram out of your body.    Call your doctor immediately if your condition worsens, for any other concerns, for a follow-up appointment or if you experience any of the following:  Significant bleeding that does not stop after 10 minutes of applying firm pressure on the puncture site.  Increased swelling of the wrist.  Unusual pain, numbness, or tingling of the wrist/arm.   Any signs of infection such as: redness, yellow drainage at the site, swelling or pain.

## 2025-04-04 LAB
EKG ATRIAL RATE: 70 BPM
EKG DIAGNOSIS: NORMAL
EKG P AXIS: 48 DEGREES
EKG P-R INTERVAL: 180 MS
EKG Q-T INTERVAL: 404 MS
EKG QRS DURATION: 100 MS
EKG QTC CALCULATION (BAZETT): 436 MS
EKG R AXIS: -23 DEGREES
EKG T AXIS: 27 DEGREES
EKG VENTRICULAR RATE: 70 BPM

## 2025-04-04 PROCEDURE — 93010 ELECTROCARDIOGRAM REPORT: CPT | Performed by: INTERNAL MEDICINE

## 2025-04-17 LAB — ECHO BSA: 2.51 M2

## 2025-05-08 NOTE — PROGRESS NOTES
Interventional Cardiology Consultation     Francis Shirley  1940    PCP: Darnell Pace MD  Referring Physician: Dr Christianson/Dr. Chisholm  Reason for Referral: IVC filter removal  Chief Complaint: \"I am fine\"     Subjective:     History of Present Illness: The patient is 84 y.o. male with a past medical history significant for coronary artery disease, venous insufficiency, atrial fibrillation, DVT s/p IVC filter, hypertension, and hyperlipidemia. He had increased swelling in both legs with recurrent cellulitis. He keeps his legs wrapped and wears compression stockings as much possible. 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 There is a robust channel from the external iliac on the right to the IVC likely a large collateral and unsuccessful attempt at removing IVC filter. Previously reported chest pain. Completed a nuclear stress test that was abnormal. [facility] 4/3/25 showed prior stents patent, non obstructive CAD. Here today for follow up. Reports overall he is doing good. He denies any new or worsening symptoms. Is planning a trip around the country in his motor home. denies chest pain, PND, orthopnea, dyspnea at rest, palpitations, syncope or edema.         Past Medical History:   Diagnosis Date    Anesthesia     hallucinations for prostate surgery     Arthritis     Atrial fibrillation (HCC)     CAD (coronary artery disease)     Cancer (HCC)     Kidney    DVT (deep venous thrombosis) (HCC)     CORNELIUS legs post prostate surgery 5 yrs ago    Gout     Hx of blood clots     Hyperlipemia 02/02/2023    Hypertension     Sleep apnea     does not use    Venous (peripheral) insufficiency     compression socks     Past Surgical History:   Procedure Laterality Date    BACK SURGERY      3 surgeries total.  Lumbar 3&4,    CARDIAC CATHETERIZATION  2011    CARDIAC PROCEDURE N/A 4/3/2025    Left heart cath / coronary angiography performed by

## 2025-05-09 ENCOUNTER — OFFICE VISIT (OUTPATIENT)
Dept: CARDIOLOGY CLINIC | Age: 85
End: 2025-05-09
Payer: MEDICARE

## 2025-05-09 VITALS
BODY MASS INDEX: 40.09 KG/M2 | SYSTOLIC BLOOD PRESSURE: 142 MMHG | DIASTOLIC BLOOD PRESSURE: 76 MMHG | OXYGEN SATURATION: 95 % | HEIGHT: 70 IN | WEIGHT: 280 LBS | HEART RATE: 69 BPM

## 2025-05-09 DIAGNOSIS — I48.0 PAF (PAROXYSMAL ATRIAL FIBRILLATION) (HCC): ICD-10-CM

## 2025-05-09 DIAGNOSIS — Z86.718 HX OF DEEP VENOUS THROMBOSIS: Primary | ICD-10-CM

## 2025-05-09 DIAGNOSIS — I25.10 CAD IN NATIVE ARTERY: ICD-10-CM

## 2025-05-09 PROCEDURE — 3077F SYST BP >= 140 MM HG: CPT | Performed by: INTERNAL MEDICINE

## 2025-05-09 PROCEDURE — G8427 DOCREV CUR MEDS BY ELIG CLIN: HCPCS | Performed by: INTERNAL MEDICINE

## 2025-05-09 PROCEDURE — 1159F MED LIST DOCD IN RCRD: CPT | Performed by: INTERNAL MEDICINE

## 2025-05-09 PROCEDURE — 3078F DIAST BP <80 MM HG: CPT | Performed by: INTERNAL MEDICINE

## 2025-05-09 PROCEDURE — 1036F TOBACCO NON-USER: CPT | Performed by: INTERNAL MEDICINE

## 2025-05-09 PROCEDURE — 1123F ACP DISCUSS/DSCN MKR DOCD: CPT | Performed by: INTERNAL MEDICINE

## 2025-05-09 PROCEDURE — 99213 OFFICE O/P EST LOW 20 MIN: CPT | Performed by: INTERNAL MEDICINE

## 2025-05-09 PROCEDURE — G8417 CALC BMI ABV UP PARAM F/U: HCPCS | Performed by: INTERNAL MEDICINE

## 2025-05-09 PROCEDURE — G2211 COMPLEX E/M VISIT ADD ON: HCPCS | Performed by: INTERNAL MEDICINE

## 2025-05-15 ENCOUNTER — TELEPHONE (OUTPATIENT)
Dept: CARDIOLOGY CLINIC | Age: 85
End: 2025-05-15

## 2025-05-15 RX ORDER — FUROSEMIDE 40 MG/1
40 TABLET ORAL DAILY
Qty: 90 TABLET | Refills: 3 | Status: SHIPPED | OUTPATIENT
Start: 2025-05-15

## 2025-05-15 NOTE — TELEPHONE ENCOUNTER
Called patient to further discuss. He reports increased edema of his lower extremities over the last 3 days. He denies any weight gain, sob, chest pain, n/v, or dizziness. States that he has been taking torsemide 20 mg daily as well as using his compression pumps with improvement in symptoms.   Reviewed medications. Informed that he is to be on furosemide 40 mg PRN (as noted in his MAR), not torsemide.   Advised patient to take furosemide 40 mg daily x 1 week as recommended in office visit with Dr. Damon 5/9/25. Cr 1.23 3/28/25.   New prescription sent to patient's preferred pharmacy.   Continue compression pumps 2x daily, daily compression stockings, elevation, and ambulation as tolerable.   Encouraged he continue to monitor symptoms and call with any new or worsening symptoms.   He v/u.

## 2025-05-15 NOTE — TELEPHONE ENCOUNTER
Last OV 5/9/25      -recommend 40 mg Lasix po daily for 1 week, encouraged to call in 2-3 weeks if no improvement     Patient said that he is still taking Torsemide 20  daily (7/5/21 Rx from Dr. Chisholm) patient said that when he called the pharmacist he told him that would still be good.  Patient started taking 3 days.  He states he is not taking furosemide.  He said that he does not have furosemide and states his AVS stated no change in medication.  No SOB.  No weight gain.No CP.  Weight yesterday at home was 277 lb.  Swelling from feet up to knees both legs. No swelling any where. My legs are shiny and purple discoloration in areas.  Indentation when he pushes his finger into them.  Urinating a lot. States that he does not use salt. But eats fast food at least once a day.  Likes Pizzas, hoagies,  Burger [employer] and Chinese food. Patient states he drinks a lot of water.    Was taking Steroid dose pack for 10 days for bronchitis.  He finished at the end of last week.  He is wondering if that could have caused his swelling.   Requested patient keep a daily weigh journal  Requested patient to elevated when sitting around the house.  Requested patient to avoid fast food, and processed foods.  Requested patient to keep sodium under 2000 mg daily and fluid intake under 64 ounces.

## 2025-05-15 NOTE — TELEPHONE ENCOUNTER
Patient called in stating his legs are very swollen.   States there is no weight gain but he also has bruising on both legs.   Started shortly after his appt on 5/9 and he has been taking his furosemide (LASIX).   He would like advice on what he should do.     Please advise.     Callback: [phone]

## (undated) DEVICE — ENDOSCOPY KIT: Brand: MEDLINE INDUSTRIES, INC.

## (undated) DEVICE — GLIDESHEATH SLENDER NITINOL HYDROPHILIC COATED INTRODUCER SHEATH: Brand: GLIDESHEATH SLENDER

## (undated) DEVICE — BITE BLOCK ENDOSCP AD 60 FR W/ ADJ STRP PLAS GRN BLOX

## (undated) DEVICE — RADIFOCUS GLIDEWIRE: Brand: GLIDEWIRE

## (undated) DEVICE — TR BAND RADIAL ARTERY COMPRESSION DEVICE: Brand: TR BAND

## (undated) DEVICE — GUIDEWIRE VASC L260CM DIA0.035IN RAD 3MM J TIP L7CM PTFE

## (undated) DEVICE — CATHETER ANGIO 5FR L100CM GRY S STL NYL JL3.5 3 SEG BRAID L

## (undated) DEVICE — CATHETER DIAG 5FR L100CM SPEC 3 DRC CRV SZ DBL BRAID WIRE

## (undated) DEVICE — CATHETER ANGIO 5FR L100CM GRY S STL NYL JR4 3 SEG BRAID L